# Patient Record
Sex: FEMALE | Race: OTHER | NOT HISPANIC OR LATINO
[De-identification: names, ages, dates, MRNs, and addresses within clinical notes are randomized per-mention and may not be internally consistent; named-entity substitution may affect disease eponyms.]

---

## 2017-03-13 ENCOUNTER — TRANSCRIPTION ENCOUNTER (OUTPATIENT)
Age: 37
End: 2017-03-13

## 2017-03-30 ENCOUNTER — TRANSCRIPTION ENCOUNTER (OUTPATIENT)
Age: 37
End: 2017-03-30

## 2017-06-20 ENCOUNTER — TRANSCRIPTION ENCOUNTER (OUTPATIENT)
Age: 37
End: 2017-06-20

## 2018-02-28 ENCOUNTER — EMERGENCY (EMERGENCY)
Facility: HOSPITAL | Age: 38
LOS: 1 days | Discharge: HOME | End: 2018-02-28
Attending: EMERGENCY MEDICINE | Admitting: EMERGENCY MEDICINE
Payer: COMMERCIAL

## 2018-02-28 VITALS
TEMPERATURE: 99 F | HEART RATE: 63 BPM | SYSTOLIC BLOOD PRESSURE: 141 MMHG | OXYGEN SATURATION: 100 % | DIASTOLIC BLOOD PRESSURE: 99 MMHG | RESPIRATION RATE: 16 BRPM | WEIGHT: 167.99 LBS | HEIGHT: 67 IN

## 2018-02-28 VITALS
DIASTOLIC BLOOD PRESSURE: 81 MMHG | OXYGEN SATURATION: 100 % | RESPIRATION RATE: 16 BRPM | SYSTOLIC BLOOD PRESSURE: 124 MMHG | HEART RATE: 55 BPM

## 2018-02-28 DIAGNOSIS — Z98.891 HISTORY OF UTERINE SCAR FROM PREVIOUS SURGERY: Chronic | ICD-10-CM

## 2018-02-28 DIAGNOSIS — Z98.82 BREAST IMPLANT STATUS: Chronic | ICD-10-CM

## 2018-02-28 DIAGNOSIS — Z98.89 OTHER SPECIFIED POSTPROCEDURAL STATES: Chronic | ICD-10-CM

## 2018-02-28 LAB
ALBUMIN SERPL ELPH-MCNC: 4.2 G/DL — SIGNIFICANT CHANGE UP (ref 3.3–5)
ALP SERPL-CCNC: 47 U/L — SIGNIFICANT CHANGE UP (ref 40–120)
ALT FLD-CCNC: 11 U/L RC — SIGNIFICANT CHANGE UP (ref 10–45)
ANION GAP SERPL CALC-SCNC: 12 MMOL/L — SIGNIFICANT CHANGE UP (ref 5–17)
AST SERPL-CCNC: 14 U/L — SIGNIFICANT CHANGE UP (ref 10–40)
BASOPHILS # BLD AUTO: 0.1 K/UL — SIGNIFICANT CHANGE UP (ref 0–0.2)
BASOPHILS NFR BLD AUTO: 0.9 % — SIGNIFICANT CHANGE UP (ref 0–2)
BILIRUB SERPL-MCNC: 0.5 MG/DL — SIGNIFICANT CHANGE UP (ref 0.2–1.2)
BUN SERPL-MCNC: 13 MG/DL — SIGNIFICANT CHANGE UP (ref 7–23)
CALCIUM SERPL-MCNC: 9.4 MG/DL — SIGNIFICANT CHANGE UP (ref 8.4–10.5)
CHLORIDE SERPL-SCNC: 105 MMOL/L — SIGNIFICANT CHANGE UP (ref 96–108)
CO2 SERPL-SCNC: 22 MMOL/L — SIGNIFICANT CHANGE UP (ref 22–31)
CREAT SERPL-MCNC: 0.82 MG/DL — SIGNIFICANT CHANGE UP (ref 0.5–1.3)
EOSINOPHIL # BLD AUTO: 0.4 K/UL — SIGNIFICANT CHANGE UP (ref 0–0.5)
EOSINOPHIL NFR BLD AUTO: 3.6 % — SIGNIFICANT CHANGE UP (ref 0–6)
GLUCOSE SERPL-MCNC: 84 MG/DL — SIGNIFICANT CHANGE UP (ref 70–99)
HCT VFR BLD CALC: 40 % — SIGNIFICANT CHANGE UP (ref 34.5–45)
HGB BLD-MCNC: 13.7 G/DL — SIGNIFICANT CHANGE UP (ref 11.5–15.5)
LYMPHOCYTES # BLD AUTO: 4.8 K/UL — HIGH (ref 1–3.3)
LYMPHOCYTES # BLD AUTO: 43.9 % — SIGNIFICANT CHANGE UP (ref 13–44)
MCHC RBC-ENTMCNC: 33.1 PG — SIGNIFICANT CHANGE UP (ref 27–34)
MCHC RBC-ENTMCNC: 34.3 GM/DL — SIGNIFICANT CHANGE UP (ref 32–36)
MCV RBC AUTO: 96.7 FL — SIGNIFICANT CHANGE UP (ref 80–100)
MONOCYTES # BLD AUTO: 0.6 K/UL — SIGNIFICANT CHANGE UP (ref 0–0.9)
MONOCYTES NFR BLD AUTO: 5.4 % — SIGNIFICANT CHANGE UP (ref 2–14)
NEUTROPHILS # BLD AUTO: 5.1 K/UL — SIGNIFICANT CHANGE UP (ref 1.8–7.4)
NEUTROPHILS NFR BLD AUTO: 46.2 % — SIGNIFICANT CHANGE UP (ref 43–77)
PLATELET # BLD AUTO: 301 K/UL — SIGNIFICANT CHANGE UP (ref 150–400)
POTASSIUM SERPL-MCNC: 4 MMOL/L — SIGNIFICANT CHANGE UP (ref 3.5–5.3)
POTASSIUM SERPL-SCNC: 4 MMOL/L — SIGNIFICANT CHANGE UP (ref 3.5–5.3)
PROT SERPL-MCNC: 8.1 G/DL — SIGNIFICANT CHANGE UP (ref 6–8.3)
RBC # BLD: 4.14 M/UL — SIGNIFICANT CHANGE UP (ref 3.8–5.2)
RBC # FLD: 11.5 % — SIGNIFICANT CHANGE UP (ref 10.3–14.5)
SODIUM SERPL-SCNC: 139 MMOL/L — SIGNIFICANT CHANGE UP (ref 135–145)
WBC # BLD: 11 K/UL — HIGH (ref 3.8–10.5)
WBC # FLD AUTO: 11 K/UL — HIGH (ref 3.8–10.5)

## 2018-02-28 PROCEDURE — 71046 X-RAY EXAM CHEST 2 VIEWS: CPT | Mod: 26

## 2018-02-28 PROCEDURE — 93010 ELECTROCARDIOGRAM REPORT: CPT

## 2018-02-28 PROCEDURE — 80053 COMPREHEN METABOLIC PANEL: CPT

## 2018-02-28 PROCEDURE — 99284 EMERGENCY DEPT VISIT MOD MDM: CPT | Mod: 25

## 2018-02-28 PROCEDURE — 71046 X-RAY EXAM CHEST 2 VIEWS: CPT

## 2018-02-28 PROCEDURE — 85027 COMPLETE CBC AUTOMATED: CPT

## 2018-02-28 RX ORDER — IBUPROFEN 200 MG
600 TABLET ORAL ONCE
Qty: 0 | Refills: 0 | Status: COMPLETED | OUTPATIENT
Start: 2018-02-28 | End: 2018-02-28

## 2018-02-28 RX ADMIN — Medication 600 MILLIGRAM(S): at 20:46

## 2018-02-28 NOTE — ED PROVIDER NOTE - ATTENDING CONTRIBUTION TO CARE
37y F hx hypothyroid here with L sided chest wall pain for past 2 days. States pain started while she was lifting suitcases on vacation. ALso notes L forearm pain. No radiation of pain down arm. Two locations are separate. Relief with advil. Recently went to [place] and then [facility]. No leg swelling, SOB, palp, ocp use, hx of VTE. No fhx scd or early CAD.   Gen: WNWD NAD  HEENT: NCAT PERRL EOMI normal pharynx  Neck: supple  CV: RRR, no murmur, Focal left sided chest wall pain reproducible on palp, no ecchymosis or crepitus  Lung: CTA BL  Abd: +BS soft NTND  Ext: wwp, palp pulses, FROMx4, no cce  Neuro: A&Ox3, CN grossly intact, sensation intact, motor 5/5 throughout  AP: 37y F hx hypothyroidism here with L chest wall pain reproducible on palp. Low risk Wells. PERC neg. Doubt cardiac or PE given hx. Labs, ekg, cxr.

## 2018-02-28 NOTE — ED PROVIDER NOTE - OBJECTIVE STATEMENT
38 y/o F pmhx hypothyroidism p/w L side chest pain and L arm pain for 2 days. Pt states she was recently on vacation in [place] and the [facility], stated that she lifted heavy luggage, woke up the next morning with sharp L anterior chest pain and forearm pain. States she 'does not feel pain radiates down L arm, feels like chest pain and then also forearm pain.' She denies ever having pain like this before. She denies that pain is worse with movement or with deep breaths; however, L chest wall is tender to the touch over the area of pain. Pain is constant and is approximately 4/10 in severity, was made better by taking ibuprofen this morning but has not tried taking a second dose. No OCPs or other hormone therapy; flight was less than 4 hours, denies any leg swelling, shortness of breath or sweating. No family members with heart disease at young age.

## 2018-02-28 NOTE — ED PROVIDER NOTE - PLAN OF CARE
1. Take ibuprofen for your pain as needed   2. Follow-up with your primary care physician this week for reevaluation   3. Return to the ER for any new or worsening symptoms

## 2018-02-28 NOTE — ED PROVIDER NOTE - CARE PLAN
Principal Discharge DX:	Atypical chest pain  Assessment and plan of treatment:	1. Take ibuprofen for your pain as needed   2. Follow-up with your primary care physician this week for reevaluation   3. Return to the ER for any new or worsening symptoms

## 2018-02-28 NOTE — ED PROVIDER NOTE - MEDICAL DECISION MAKING DETAILS
38 y/o F no pmhx p/w L anterior chest wall pain 4/10 relieved with NSAIDs accompanied by L forearm pain that does not feel like radiation, after lifting heavy suitcases throughout vacation she returned from yesterday. PE remarkable only fo tenderness to L anterior chest wall. FROM extremities. No tachycardia, tachypnea or hypoxia. No leg swelling. PERC negative. EKG within normal limits. No fhx CAD at young age, no risk factors. Will obtain basic labs, provide NSAID as relieved pain previously, CXR and reassess. 36 y/o F no pmhx p/w L anterior chest wall pain 4/10 relieved with NSAIDs accompanied by L forearm pain that does not feel like radiation, after lifting heavy suitcases throughout vacation she returned from yesterday. PE remarkable only fo tenderness to L anterior chest wall. FROM extremities. No tachycardia, tachypnea or hypoxia. No leg swelling. PERC negative. EKG within normal limits. No fhx CAD at young age, no risk factors. Will obtain basic labs, provide NSAID as relieved pain previously, CXR and reassess.  Meño: See attending statement below

## 2018-02-28 NOTE — ED PROVIDER NOTE - PHYSICAL EXAMINATION
GEN: Well Appearing, Nontoxic, NAD  HEENT: Symm Facies, PERRL, EOMI, MMM, posterior pharynx clear  CV: No JVD/Bruits or stridor;  RRR w/o m/g/r. +tenderness to L anterior chest wall   RESP: CTAB w/o w/r/r  ABD: Soft, nt/nd, +bs, no guarding/rebound, no RUQ tender;  No CVAT  EXT: No lower extremity edema or calf tenderness. WWP, palpable pulses. FROMx4  SKIN: No erythema, lesions or rash  Neuro: Grossly intact, AOX3 with normal speech, CN II-XII intact; Sensation intact, motor 5/5 throughout; gait normal

## 2018-02-28 NOTE — ED PROVIDER NOTE - NS ED ROS FT
Constitutional: No fever or chills  Eyes: No visual changes, eye pain or redness  HEENT: No throat pain, ear pain, nasal pain. No nose bleeding.  CV: +chest pain L side anterior chest. No lower extremity edema  Resp: No SOB no cough  GI: No abd pain. No nausea or vomiting. No diarrhea. No constipation.   : No dysuria, hematuria.   MSK: +L forearm pain  Skin: No rash  Neuro: No headache. No numbness or tingling. No weakness.

## 2018-02-28 NOTE — ED ADULT TRIAGE NOTE - CHIEF COMPLAINT QUOTE
cp x 2 days, worse with movement and with deep inspirations radiating down left arm and into left side of neck   recent travel,  no bc, nonsmoker no hx of dvt/pe in the past

## 2018-02-28 NOTE — ED ADULT NURSE NOTE - OBJECTIVE STATEMENT
Pt presents to ED awake, alert and ambulatory, c/o chest pain x 2 days. Pt states she returned from a trip to [place] yesterday. Pt reports left sided pain radiating down her left arm and left neck. Pt denies SOB or palpitations. No cardiac history. Pt appears well. CTABL. h/o hypothyroidism. Pt has been lifting luggage and her daughter.

## 2018-05-11 ENCOUNTER — EMERGENCY (EMERGENCY)
Facility: HOSPITAL | Age: 38
LOS: 1 days | Discharge: HOME | End: 2018-05-11
Attending: PERSONAL EMERGENCY RESPONSE ATTENDANT
Payer: COMMERCIAL

## 2018-05-11 VITALS
RESPIRATION RATE: 18 BRPM | TEMPERATURE: 98 F | DIASTOLIC BLOOD PRESSURE: 65 MMHG | OXYGEN SATURATION: 100 % | HEART RATE: 75 BPM | SYSTOLIC BLOOD PRESSURE: 110 MMHG

## 2018-05-11 VITALS
HEART RATE: 72 BPM | WEIGHT: 169.98 LBS | HEIGHT: 67 IN | OXYGEN SATURATION: 99 % | DIASTOLIC BLOOD PRESSURE: 88 MMHG | SYSTOLIC BLOOD PRESSURE: 129 MMHG | TEMPERATURE: 99 F | RESPIRATION RATE: 18 BRPM

## 2018-05-11 DIAGNOSIS — Z98.89 OTHER SPECIFIED POSTPROCEDURAL STATES: Chronic | ICD-10-CM

## 2018-05-11 DIAGNOSIS — Z98.891 HISTORY OF UTERINE SCAR FROM PREVIOUS SURGERY: Chronic | ICD-10-CM

## 2018-05-11 DIAGNOSIS — Z98.82 BREAST IMPLANT STATUS: Chronic | ICD-10-CM

## 2018-05-11 LAB
ALBUMIN SERPL ELPH-MCNC: 4.5 G/DL — SIGNIFICANT CHANGE UP (ref 3.3–5)
ALP SERPL-CCNC: 49 U/L — SIGNIFICANT CHANGE UP (ref 40–120)
ALT FLD-CCNC: 18 U/L — SIGNIFICANT CHANGE UP (ref 10–45)
ANION GAP SERPL CALC-SCNC: 13 MMOL/L — SIGNIFICANT CHANGE UP (ref 5–17)
AST SERPL-CCNC: 24 U/L — SIGNIFICANT CHANGE UP (ref 10–40)
BASOPHILS # BLD AUTO: 0 K/UL — SIGNIFICANT CHANGE UP (ref 0–0.2)
BASOPHILS NFR BLD AUTO: 0.1 % — SIGNIFICANT CHANGE UP (ref 0–2)
BILIRUB SERPL-MCNC: 0.4 MG/DL — SIGNIFICANT CHANGE UP (ref 0.2–1.2)
BUN SERPL-MCNC: 7 MG/DL — SIGNIFICANT CHANGE UP (ref 7–23)
CALCIUM SERPL-MCNC: 9.6 MG/DL — SIGNIFICANT CHANGE UP (ref 8.4–10.5)
CHLORIDE SERPL-SCNC: 101 MMOL/L — SIGNIFICANT CHANGE UP (ref 96–108)
CO2 SERPL-SCNC: 22 MMOL/L — SIGNIFICANT CHANGE UP (ref 22–31)
CREAT SERPL-MCNC: 0.67 MG/DL — SIGNIFICANT CHANGE UP (ref 0.5–1.3)
EOSINOPHIL # BLD AUTO: 0.5 K/UL — SIGNIFICANT CHANGE UP (ref 0–0.5)
EOSINOPHIL NFR BLD AUTO: 4.9 % — SIGNIFICANT CHANGE UP (ref 0–6)
GLUCOSE SERPL-MCNC: 86 MG/DL — SIGNIFICANT CHANGE UP (ref 70–99)
HCT VFR BLD CALC: 41.2 % — SIGNIFICANT CHANGE UP (ref 34.5–45)
HGB BLD-MCNC: 14 G/DL — SIGNIFICANT CHANGE UP (ref 11.5–15.5)
LYMPHOCYTES # BLD AUTO: 2.4 K/UL — SIGNIFICANT CHANGE UP (ref 1–3.3)
LYMPHOCYTES # BLD AUTO: 25 % — SIGNIFICANT CHANGE UP (ref 13–44)
MCHC RBC-ENTMCNC: 33.2 PG — SIGNIFICANT CHANGE UP (ref 27–34)
MCHC RBC-ENTMCNC: 34 GM/DL — SIGNIFICANT CHANGE UP (ref 32–36)
MCV RBC AUTO: 97.6 FL — SIGNIFICANT CHANGE UP (ref 80–100)
MONOCYTES # BLD AUTO: 0.7 K/UL — SIGNIFICANT CHANGE UP (ref 0–0.9)
MONOCYTES NFR BLD AUTO: 7.3 % — SIGNIFICANT CHANGE UP (ref 2–14)
NEUTROPHILS # BLD AUTO: 6 K/UL — SIGNIFICANT CHANGE UP (ref 1.8–7.4)
NEUTROPHILS NFR BLD AUTO: 62.7 % — SIGNIFICANT CHANGE UP (ref 43–77)
PLATELET # BLD AUTO: 248 K/UL — SIGNIFICANT CHANGE UP (ref 150–400)
POTASSIUM SERPL-MCNC: 4.2 MMOL/L — SIGNIFICANT CHANGE UP (ref 3.5–5.3)
POTASSIUM SERPL-SCNC: 4.2 MMOL/L — SIGNIFICANT CHANGE UP (ref 3.5–5.3)
PROT SERPL-MCNC: 8.1 G/DL — SIGNIFICANT CHANGE UP (ref 6–8.3)
RAPID RVP RESULT: DETECTED
RBC # BLD: 4.22 M/UL — SIGNIFICANT CHANGE UP (ref 3.8–5.2)
RBC # FLD: 12.2 % — SIGNIFICANT CHANGE UP (ref 10.3–14.5)
RV+EV RNA SPEC QL NAA+PROBE: DETECTED
SODIUM SERPL-SCNC: 136 MMOL/L — SIGNIFICANT CHANGE UP (ref 135–145)
WBC # BLD: 9.7 K/UL — SIGNIFICANT CHANGE UP (ref 3.8–10.5)
WBC # FLD AUTO: 9.7 K/UL — SIGNIFICANT CHANGE UP (ref 3.8–10.5)

## 2018-05-11 PROCEDURE — 87486 CHLMYD PNEUM DNA AMP PROBE: CPT

## 2018-05-11 PROCEDURE — 93005 ELECTROCARDIOGRAM TRACING: CPT

## 2018-05-11 PROCEDURE — 87798 DETECT AGENT NOS DNA AMP: CPT

## 2018-05-11 PROCEDURE — 84443 ASSAY THYROID STIM HORMONE: CPT

## 2018-05-11 PROCEDURE — 99285 EMERGENCY DEPT VISIT HI MDM: CPT | Mod: 25

## 2018-05-11 PROCEDURE — 87581 M.PNEUMON DNA AMP PROBE: CPT

## 2018-05-11 PROCEDURE — 71046 X-RAY EXAM CHEST 2 VIEWS: CPT | Mod: 26

## 2018-05-11 PROCEDURE — 94640 AIRWAY INHALATION TREATMENT: CPT

## 2018-05-11 PROCEDURE — 87633 RESP VIRUS 12-25 TARGETS: CPT

## 2018-05-11 PROCEDURE — 80053 COMPREHEN METABOLIC PANEL: CPT

## 2018-05-11 PROCEDURE — 93010 ELECTROCARDIOGRAM REPORT: CPT

## 2018-05-11 PROCEDURE — 99284 EMERGENCY DEPT VISIT MOD MDM: CPT | Mod: 25

## 2018-05-11 PROCEDURE — 96374 THER/PROPH/DIAG INJ IV PUSH: CPT

## 2018-05-11 PROCEDURE — 71046 X-RAY EXAM CHEST 2 VIEWS: CPT

## 2018-05-11 PROCEDURE — 85027 COMPLETE CBC AUTOMATED: CPT

## 2018-05-11 RX ORDER — IPRATROPIUM/ALBUTEROL SULFATE 18-103MCG
3 AEROSOL WITH ADAPTER (GRAM) INHALATION ONCE
Qty: 0 | Refills: 0 | Status: COMPLETED | OUTPATIENT
Start: 2018-05-11 | End: 2018-05-11

## 2018-05-11 RX ORDER — ACETAMINOPHEN 500 MG
1000 TABLET ORAL ONCE
Qty: 0 | Refills: 0 | Status: COMPLETED | OUTPATIENT
Start: 2018-05-11 | End: 2018-05-11

## 2018-05-11 RX ORDER — ALBUTEROL 90 UG/1
3 AEROSOL, METERED ORAL
Qty: 14 | Refills: 0
Start: 2018-05-11 | End: 2018-05-24

## 2018-05-11 RX ORDER — FLUTICASONE PROPIONATE 50 MCG
1 SPRAY, SUSPENSION NASAL
Qty: 1 | Refills: 0
Start: 2018-05-11 | End: 2018-05-15

## 2018-05-11 RX ORDER — SODIUM CHLORIDE 9 MG/ML
500 INJECTION INTRAMUSCULAR; INTRAVENOUS; SUBCUTANEOUS ONCE
Qty: 0 | Refills: 0 | Status: COMPLETED | OUTPATIENT
Start: 2018-05-11 | End: 2018-05-11

## 2018-05-11 RX ADMIN — Medication 3 MILLILITER(S): at 13:56

## 2018-05-11 RX ADMIN — Medication 40 MILLIGRAM(S): at 16:03

## 2018-05-11 RX ADMIN — Medication 1000 MILLIGRAM(S): at 14:15

## 2018-05-11 RX ADMIN — SODIUM CHLORIDE 500 MILLILITER(S): 9 INJECTION INTRAMUSCULAR; INTRAVENOUS; SUBCUTANEOUS at 13:56

## 2018-05-11 RX ADMIN — Medication 3 MILLILITER(S): at 13:55

## 2018-05-11 RX ADMIN — Medication 400 MILLIGRAM(S): at 13:55

## 2018-05-11 NOTE — ED PROVIDER NOTE - OBJECTIVE STATEMENT
36 yo F with pmhx asthma, atrophic kidney disease, and htn presenting with sobx yesterday. Patient states she began with nasal congestion and productive cough yesterday with a fever of 102. Patient states today she began with chest tightness and chest pain. Patient admits to bodyaches. Patient states she took extra amoxicillin yesterday for her symptoms. Patient states fever has resolved. Patient denies recent travel, edema, calf tenderness, ha, tingling, numbness, weakness, back pain, dysuria, hematuria, abd pain, nvd, and sore throat

## 2018-05-11 NOTE — ED ADULT NURSE NOTE - OBJECTIVE STATEMENT
38 y/o female presents to ed c/o cold/ Pt states yesterday she felt congested with cough and uncomfortable in her chest. Had a fever yesterday and took some left over amoxicillin she had at home and Tylenol. Presents afebrile c/o general fatigue and difficulty breathing. Denies ha, n/v/d, abdominal pain, f/c, urinary symptoms, hematuria. A&Ox4, vss, skin warm dry and intact, MAEx4, wheezing noted to lungs, abd soft nondistended. Pt resting comfortably with VSS, no complaints at this time. Patient's bed in the lowest position, explained plan of care to patient and family members. Will continue to reassess.

## 2018-05-11 NOTE — ED PROVIDER NOTE - CONDUCTED A DETAILED DISCUSSION WITH PATIENT AND/OR GUARDIAN REGARDING, MDM
lab results/radiology results lab results/return to ED if symptoms worsen, persist or questions arise/need for outpatient follow-up/radiology results

## 2018-05-11 NOTE — ED PROVIDER NOTE - ATTENDING CONTRIBUTION TO CARE
Attending MD Navarro.  Agree with above.  Pt is a 37 yr old female with pmhx of asthma, HTN, atrophic renal disease with 'normal kidney function' presents today wth fever to 102, nasal congestion, productive cough.  Mild wheezing.  Duonebs dosed.  Fluids administered.  RVP administered.  CXR ordered.  No hx of intubation/admission for asthma.  Pt is moving air throughout bilateral lung fields.  HCG negative.

## 2018-05-11 NOTE — ED PROVIDER NOTE - CARE PLAN
Principal Discharge DX:	Upper respiratory tract infection, unspecified type  Assessment and plan of treatment:	rest and hydration. tylenol 650mg every 6 hours for fever or pain as needed. prednsione 40mg starting tomorrow for four more days. Flonase two sprays each nostril once a day. Start nebulizer treatments at home. Follow up with your primary doctor in 1-2 days or NS clinic [phone]. return to the ER immediately for worsening pain, shortness of breath, vomiting or fever Principal Discharge DX:	Upper respiratory tract infection, unspecified type  Assessment and plan of treatment:	rest and hydration. tylenol 650mg every 6 hours for fever or pain as needed. prednsione 40mg starting tomorrow for four more days. Flonase two sprays each nostril once a day. Start nebulizer treatments at home. Follow up with your primary doctor in 1-2 days or NS clinic [phone]. return to the ER immediately for worsening pain, shortness of breath, vomiting or fever  Secondary Diagnosis:	Rhinovirus infection

## 2018-05-11 NOTE — ED PROVIDER NOTE - PLAN OF CARE
rest and hydration. tylenol 650mg every 6 hours for fever or pain as needed. prednsione 40mg starting tomorrow for four more days. Flonase two sprays each nostril once a day. Start nebulizer treatments at home. Follow up with your primary doctor in 1-2 days or NS clinic [phone]. return to the ER immediately for worsening pain, shortness of breath, vomiting or fever

## 2018-05-11 NOTE — ED PROVIDER NOTE - PROGRESS NOTE DETAILS
discussed results with the patient. Patient feeling better will follow up outpatient Attending MD Navarro.  Pt's breath sounds are now clear, moving air throughout all lung fields without wheezing or inc WOB.  Pt amends that she has been admitted for asthma previously but has never been intubated.  Pt counseled re: rhino/entero dx c/w generalized malaise, rhinorrhea and cough.  She has a nebulizer at home but no refills.  Advised will rx steroids and nebulizer refills.  Pt reassured re: rhino/entero c/w flu-like sxs.  Advised that though current sxs do not warrant admission for supportive care, there are specific sx changes that would warrant emergent return to ED.  Pt counseled that should they develop high fevers >103-104 or fevers unresponsive to tylenol/motrin, should they develop worsened SOB or any respiratory distress including but not limited to increased SOB/inability to catch breath/inability to ambulate without marked SOB, neck pain/stiffness, confusion, inability to tolerate PO or syncope they should return to ED.  As always, any CP/syncope/focal numbness/weakness or tingling, severe thunderclap headache all warrant emergent return as well.  Counseled to alternate tylenol and motrin (650 mg and 600 mg respectively) every 3 hrs for sxs control.  Increase hydration and PO of fluids.  Follow-up with PCP in 1-3 days to be reassessed.  Verbalizes understanding of above instructions and plan of care.  Stable for discharge at this time.  Follow-up with PCP.  Referred to pulmonology for ongoing management of asthma sxs.

## 2018-05-11 NOTE — ED PROVIDER NOTE - CHPI ED SYMPTOMS POS
CHEST CONGESTION/FEVER/DIFFICULTY BREATHING/SHORTNESS OF BREATH/NASAL CONGESTION/CHEST PAIN/CHILLS/COUGH

## 2018-05-11 NOTE — ED PROVIDER NOTE - MEDICAL DECISION MAKING DETAILS
36 yo F with pmhx asthma, atrophic kidney disease, and htn presenting with URI co sob and cp. Ekg/cxr/IVF/urine pregnancy/bw/RVP

## 2018-05-12 LAB — TSH SERPL-MCNC: 0.15 UIU/ML — LOW (ref 0.27–4.2)

## 2018-05-13 NOTE — ED POST DISCHARGE NOTE - DETAILS
Pt doing well, no complaints.  Informed of low TSH level, and need to follow up with PCP for repeat testing as she may need to decrease her dose.  Esau

## 2018-09-03 ENCOUNTER — EMERGENCY (EMERGENCY)
Facility: HOSPITAL | Age: 38
LOS: 1 days | Discharge: HOME | End: 2018-09-03
Attending: EMERGENCY MEDICINE
Payer: COMMERCIAL

## 2018-09-03 VITALS
HEIGHT: 67 IN | HEART RATE: 54 BPM | DIASTOLIC BLOOD PRESSURE: 88 MMHG | SYSTOLIC BLOOD PRESSURE: 129 MMHG | OXYGEN SATURATION: 98 % | TEMPERATURE: 98 F | WEIGHT: 167.99 LBS | RESPIRATION RATE: 18 BRPM

## 2018-09-03 DIAGNOSIS — Z98.89 OTHER SPECIFIED POSTPROCEDURAL STATES: Chronic | ICD-10-CM

## 2018-09-03 DIAGNOSIS — Z98.891 HISTORY OF UTERINE SCAR FROM PREVIOUS SURGERY: Chronic | ICD-10-CM

## 2018-09-03 DIAGNOSIS — Z98.82 BREAST IMPLANT STATUS: Chronic | ICD-10-CM

## 2018-09-03 PROCEDURE — 99284 EMERGENCY DEPT VISIT MOD MDM: CPT

## 2018-09-03 PROCEDURE — 96374 THER/PROPH/DIAG INJ IV PUSH: CPT

## 2018-09-03 PROCEDURE — 96375 TX/PRO/DX INJ NEW DRUG ADDON: CPT

## 2018-09-03 PROCEDURE — 99284 EMERGENCY DEPT VISIT MOD MDM: CPT | Mod: 25

## 2018-09-03 RX ORDER — METOCLOPRAMIDE HCL 10 MG
10 TABLET ORAL ONCE
Qty: 0 | Refills: 0 | Status: COMPLETED | OUTPATIENT
Start: 2018-09-03 | End: 2018-09-03

## 2018-09-03 RX ORDER — ACETAMINOPHEN 500 MG
975 TABLET ORAL ONCE
Qty: 0 | Refills: 0 | Status: COMPLETED | OUTPATIENT
Start: 2018-09-03 | End: 2018-09-03

## 2018-09-03 RX ORDER — CIPROFLOXACIN AND DEXAMETHASONE 3; 1 MG/ML; MG/ML
4 SUSPENSION/ DROPS AURICULAR (OTIC) ONCE
Qty: 0 | Refills: 0 | Status: COMPLETED | OUTPATIENT
Start: 2018-09-03 | End: 2018-09-03

## 2018-09-03 RX ORDER — DEXAMETHASONE 0.5 MG/5ML
10 ELIXIR ORAL ONCE
Qty: 0 | Refills: 0 | Status: COMPLETED | OUTPATIENT
Start: 2018-09-03 | End: 2018-09-03

## 2018-09-03 RX ADMIN — Medication 975 MILLIGRAM(S): at 20:11

## 2018-09-03 RX ADMIN — Medication 10 MILLIGRAM(S): at 20:42

## 2018-09-03 RX ADMIN — CIPROFLOXACIN AND DEXAMETHASONE 4 DROP(S): 3; 1 SUSPENSION/ DROPS AURICULAR (OTIC) at 20:59

## 2018-09-03 NOTE — ED ADULT NURSE NOTE - NSIMPLEMENTINTERV_GEN_ALL_ED
Implemented All Universal Safety Interventions:  [place] to call system. Call bell, personal items and telephone within reach. Instruct patient to call for assistance. Room bathroom lighting operational. Non-slip footwear when patient is off stretcher. Physically safe environment: no spills, clutter or unnecessary equipment. Stretcher in lowest position, wheels locked, appropriate side rails in place.

## 2018-09-03 NOTE — ED PROVIDER NOTE - PROGRESS NOTE DETAILS
Pt feeling 100% better now. Pt asking to go home now. Stable for dc with PCP follow up. Attending Sheldon Rowan DO

## 2018-09-03 NOTE — ED ADULT TRIAGE NOTE - CHIEF COMPLAINT QUOTE
headache for few months pt states saw pmd 2 weeeks ago has appt with neurologist 9/10 but states feeling head pressure

## 2018-09-03 NOTE — ED PROVIDER NOTE - PHYSICAL EXAMINATION
Pt in NAD, A&O x3. GCS 15. CN 2-12 grossly intact. Head NCAT, no scalp or temporal TTP. Sclera white w/o injection PERRLA, EOMI. Nares without deformity, no DC. No auricular tenderness, TMs pearly grey b/l no vesicles. Mouth and pharynx w/o erythema or exudates, uvula midline, no tonsillar enlargement. Neck supple FROM. No midline or paraspinal tenderness. No retractions or chest wall deformities. No chest wall tenderness, CTA anterior and posterior b/l, no wheezes, rales, or rhonchi. RRR clear distinct S1, S2, no S3, S4, murmurs, gallops, or rubs. Abdomen soft, non-tender, no rebound or guarding, no organomegaly or masses. No CVAT b/l. 2+ carotid, radial and dorsalis pulses b/l. No edema or tenderness of the lower extremities. No joint erythema or obvious deformities. Spine without obvious deformity. No midline or paraspinal tenderness. FROM w/o pain of spine, upper and lower extremities b/l. Normal and equal sensation UE, LE and face b/l. 5/5 strength upper and lower extremity b/l.  Romberg negative, pronator drift negative. Normal gait and gross cerebellar functioning. No rashes or vesicles noted on head/face.

## 2018-09-03 NOTE — ED PROVIDER NOTE - OBJECTIVE STATEMENT
39 y/o F with h/o Ashthma, hypothyroidism, kidney atrophy, and recently DX'ed HTN on losartan presents c/o SHEPPARD x 2 months. HA is typically L sided temporoparietal, occasionally b/l, described as a pressure, she has experienced almost daily since onset, intensity is gradually worsening since onset. Pt states that HA is typically present in the morning after she wakes up, initially had modest relief with Tylenol 650mg, but now only has only mild relief. HA lasts most of the day when it occurs. HA a/w nausea, not vomiting, no photophobia, no change in vision, no nuchal rigidity, no f/c, or head trauma. Pt states that once 2 weeks ago she had a L sided periorbital HA that awoke her from sleep but resolved shortly after. Pt has been seen by her PCP x 2 who states this is likely a tension HA, at her last appt she was referred to neuro and has appt 9/10. She presents today because her HA has been more severe than usual and constant since yesterday, 7/10, pressure, occasional radiation down L lateral neck, took Aleve at 12:00 and Advil earlier in the AM with minimal relief.   Denies recent illness/URI, rhinorrhea/nasal congestion/lacrimation a/w, nasal DC, ear pain, head trauma/injury, numbness, paresthesias, weakness, difficulty walking/speaking/swallowing, joint pain/redness, proximal muscle pain/weakness, personal or fh/o migraines or other HA.

## 2018-09-03 NOTE — ED PROVIDER NOTE - CARE PLAN
Principal Discharge DX:	Acute nonintractable headache, unspecified headache type  Secondary Diagnosis:	Acute otitis externa of left ear, unspecified type

## 2018-09-03 NOTE — ED ADULT NURSE NOTE - CHPI ED NUR SYMPTOMS NEG
no nausea/no change in level of consciousness/no dizziness/no numbness/no vomiting/no blurred vision/no weakness/no fever/no loss of consciousness/no confusion

## 2018-09-03 NOTE — ED PROVIDER NOTE - MEDICAL DECISION MAKING DETAILS
Attending Sheldon Rowan DO: 37 yo female presents with left sided temporal headache x2 months. Waxes and wanes. Gradual in onset. No neck pain/stiffness or back pain. No fevers or chills. Also endorses left ear dullness intermittent at times. PE: well appearing, nad, neck supple, CN II-XII intact, normal strength and sensation, normal gait, no pronator drift, Left ear external canal erythematous, tender with otoscopic insertion which reproduces pts headache. A/P: Primary headache vs ear infection causing pain. Will give migraine cocktail, ear drops, reevaluate.

## 2018-09-03 NOTE — ED ADULT NURSE NOTE - OBJECTIVE STATEMENT
pt has left sided headache ro 2 days taking Advil and Motrin not helping.  pt has left eat pian and on exam has inner ear infection redness noted on exam no fever chills no neck pain IVL placed and meds given as ordered Marialuisa

## 2018-09-04 PROBLEM — E03.9 HYPOTHYROIDISM, UNSPECIFIED: Chronic | Status: ACTIVE | Noted: 2018-02-28

## 2018-09-04 PROBLEM — J45.909 UNSPECIFIED ASTHMA, UNCOMPLICATED: Chronic | Status: ACTIVE | Noted: 2018-02-28

## 2018-09-04 PROBLEM — N26.1 ATROPHY OF KIDNEY (TERMINAL): Chronic | Status: ACTIVE | Noted: 2018-02-28

## 2019-12-18 ENCOUNTER — TRANSCRIPTION ENCOUNTER (OUTPATIENT)
Age: 39
End: 2019-12-18

## 2020-03-30 PROBLEM — I10 ESSENTIAL (PRIMARY) HYPERTENSION: Chronic | Status: ACTIVE | Noted: 2018-09-03

## 2020-04-02 PROBLEM — Z00.00 ENCOUNTER FOR PREVENTIVE HEALTH EXAMINATION: Status: ACTIVE | Noted: 2020-04-02

## 2020-04-03 ENCOUNTER — APPOINTMENT (OUTPATIENT)
Dept: DISASTER EMERGENCY | Facility: CLINIC | Age: 40
End: 2020-04-03
Payer: COMMERCIAL

## 2020-04-03 VITALS
SYSTOLIC BLOOD PRESSURE: 118 MMHG | OXYGEN SATURATION: 99 % | TEMPERATURE: 99 F | RESPIRATION RATE: 18 BRPM | HEART RATE: 60 BPM | DIASTOLIC BLOOD PRESSURE: 80 MMHG

## 2020-04-03 DIAGNOSIS — R05 COUGH: ICD-10-CM

## 2020-04-03 DIAGNOSIS — R68.89 OTHER GENERAL SYMPTOMS AND SIGNS: ICD-10-CM

## 2020-04-03 DIAGNOSIS — M79.10 MYALGIA, UNSPECIFIED SITE: ICD-10-CM

## 2020-04-03 DIAGNOSIS — R06.02 SHORTNESS OF BREATH: ICD-10-CM

## 2020-04-03 DIAGNOSIS — R50.9 FEVER, UNSPECIFIED: ICD-10-CM

## 2020-04-03 PROCEDURE — 99213 OFFICE O/P EST LOW 20 MIN: CPT

## 2020-04-03 NOTE — HISTORY OF PRESENT ILLNESS
[] : no dizziness on standing [With Confirmed Case] : patient exposed to a confirmed case of COVID-19 [Lung Disease] : lung disease [None] : none [Clear] : clear [Good Air Entry] : good air entry [Speaks in full sentences] : speaks in full sentences [Normal O2 sat at rest] : normal O2 sat at rest [Grossly normal, interacts, not tired or weak] : grossly normal, interacts, not tired or weak [FreeTextEntry1] : Had low fever, chest cold, lack of energy, x 2 weeks ago. Started with persistent back pain, with chest discomfort when deep breathing, different than Asthma exacerbation. She states occasional dry cough, and SOB at times (uses her inhaler twice daily), was exposed to positive covid-19 person. Patient states has nasal congestion right now\par H/O HTN, Hypothyroidism, Asthma (no hospitalization for approximately 10 years, no intubation ) [TextBox_48] : Rare cough, SOB [TextBox_66] : 40 yo female with history of  Asthma, HTN, Hypothyroidism [TextBox_107] : Ordered and performed nasal swab\par Patient is advised to continue monitoring her temperature, to remain in quarantine until no temperature x 3 days without anti fever medications, then practice social distancing at all times\par Patient is also advised to go to the ER, if severe chest pain, SOB

## 2020-04-03 NOTE — HISTORY OF PRESENT ILLNESS
[] : no dizziness on standing [With Confirmed Case] : patient exposed to a confirmed case of COVID-19 [Lung Disease] : lung disease [None] : none [Clear] : clear [Good Air Entry] : good air entry [Speaks in full sentences] : speaks in full sentences [Normal O2 sat at rest] : normal O2 sat at rest [Grossly normal, interacts, not tired or weak] : grossly normal, interacts, not tired or weak [FreeTextEntry1] : Had low fever, chest cold, lack of energy, x 2 weeks ago. Started with persistent back pain, with chest discomfort when deep breathing, different than Asthma exacerbation. She states occasional dry cough, and SOB at times (uses her inhaler twice daily), was exposed to positive covid-19 person. Patient states has nasal congestion right now\par H/O HTN, Hypothyroidism, Asthma (no hospitalization for approximately 10 years, no intubation ) [TextBox_48] : Rare cough, SOB [TextBox_66] : 38 yo female with history of  Asthma, HTN, Hypothyroidism [TextBox_107] : Ordered and performed nasal swab\par Patient is advised to continue monitoring her temperature, to remain in quarantine until no temperature x 3 days without anti fever medications, then practice social distancing at all times\par Patient is also advised to go to the ER, if severe chest pain, SOB

## 2020-04-07 LAB — SARS-COV-2 N GENE NPH QL NAA+PROBE: DETECTED

## 2020-04-13 ENCOUNTER — TRANSCRIPTION ENCOUNTER (OUTPATIENT)
Age: 40
End: 2020-04-13

## 2020-05-13 PROBLEM — R06.02 SHORTNESS OF BREATH: Status: ACTIVE | Noted: 2020-05-13

## 2020-05-13 PROBLEM — R05 COUGH: Status: ACTIVE | Noted: 2020-05-13

## 2020-05-13 PROBLEM — M79.10 MYALGIA: Status: ACTIVE | Noted: 2020-05-13

## 2020-05-13 PROBLEM — R50.9 FEVER: Status: ACTIVE | Noted: 2020-05-13

## 2020-09-09 NOTE — ED PROVIDER NOTE - DIAGNOSIS COUNSELING, MDM
Patient Location: Home       Provider Location ([facility]/State): [place]       This virtual visit was conducted via interactive/real-time audio/video. Pursuant to the emergency declaration under the [facility], [facility] waiver authority and the [employer] Act, this Virtual  Visit was conducted, with patient's consent, to reduce the patient's risk of exposure to COVID-19 and provide continuity of care for an established patient. Services were provided through a video synchronous discussion virtually to substitute for in-person clinic visit. Additionally, this provider made reasonable effort to verify identify of patient, conducted risk benefit analysis and have determined patient's presenting problem and condition are consistent with the use of telepsychology to patient's benefit, ensured pt has access, knowledge, and skills required to use required technology, obtained alternative means of contacting patient, provided pt with alternative means of contacting provider, reviewed informed consent and obtained verbal agreement in lieu of written informed consent, as such is rendered impossible due to the unexpected nature secondary to COVID-19 clinical recommendations. Behavioral Health Consultation  Aishwarya Decker Psy.D. Psychologist      Time spent with Patient: 30 minutes  Visit number: 2  Reason for Consult:  AD/HD and anxiety   Referring Provider: Mehul Sullivan MD   [facility], [address] [place] [address]    S:  ----------------------------------------------------------------------------------------------------------------------  AD/HD and anxiety   \"It's been a rough week. He's been having nightmares. \" Mom stated he has \"broken down\" two or three times in past two weeks. Grief process continues. Today time was spent discussing physical expressions of grief. Tj and mom present for duration of visit. O:  ----------------------------------------------------------------------------------------------------------------------  MSE:  Orientation:  oriented to person, place, time, and general circumstances  Appearance:  alert, cooperative  Speech:  spontaneous, normal rate and normal volume  Mood: AD/HD and anxiety    Thought Content:  intact  Thought Process:  linear, goal directed and coherent  Interest/Pleasure: Normal  Concentration: HX of ADHD  Sleep disturbance: No  Appetite: Decreased  Memory:  recent and remote memory intact  Energy: Normal  Morbid ideation No  Suicide Assessment: no suicidal ideation    A:  ----------------------------------------------------------------------------------------------------------------------  Diagnosis:    1. Attention deficit hyperactivity disorder (ADHD), combined type    2. Anxiety         P:  ----------------------------------------------------------------------------------------------------------------------  Pt interventions:    General:   [x] Blairs-setting to identify pt's primary goals for [facility] visit / overall health   [x] Provided psychoeducation/handout on:   1. Attention deficit hyperactivity disorder (ADHD), combined type    2. Anxiety        [x]  Supportive interventions    Behavioral:   [x] Discussed and set plan for behavioral management of   1. Attention deficit hyperactivity disorder (ADHD), combined type    2. Anxiety        [x] Discussed and problem-solved barriers in adhering to behavioral change plan   [x] Motivational Interviewing to increase patient confidence and compliance with       adhering to behavioral change plan   [x] Discussed potential barriers to change   [x] Discussed self-care (sleep, nutrition, rewarding activities, social support, exercise)    Other:   []   []   []   []    Recommendations to patient:    1. Return to Dr. Beatriz Hernandez in 2 week(s)    2.                Feedback provided to pt's PCP via EPIC and/or oral report conducted a detailed discussion... I had a detailed discussion with the patient and/or guardian regarding the historical points, exam findings, and any diagnostic results supporting the discharge/admit diagnosis.

## 2020-12-09 ENCOUNTER — TRANSCRIPTION ENCOUNTER (OUTPATIENT)
Age: 40
End: 2020-12-09

## 2020-12-10 ENCOUNTER — TRANSCRIPTION ENCOUNTER (OUTPATIENT)
Age: 40
End: 2020-12-10

## 2021-02-08 ENCOUNTER — TRANSCRIPTION ENCOUNTER (OUTPATIENT)
Age: 41
End: 2021-02-08

## 2021-02-17 ENCOUNTER — TRANSCRIPTION ENCOUNTER (OUTPATIENT)
Age: 41
End: 2021-02-17

## 2021-03-18 ENCOUNTER — TRANSCRIPTION ENCOUNTER (OUTPATIENT)
Age: 41
End: 2021-03-18

## 2021-03-20 ENCOUNTER — APPOINTMENT (OUTPATIENT)
Dept: ORTHOPEDIC SURGERY | Facility: CLINIC | Age: 41
End: 2021-03-20
Payer: COMMERCIAL

## 2021-03-20 VITALS
WEIGHT: 168 LBS | BODY MASS INDEX: 26.37 KG/M2 | DIASTOLIC BLOOD PRESSURE: 80 MMHG | HEIGHT: 67 IN | SYSTOLIC BLOOD PRESSURE: 116 MMHG | HEART RATE: 55 BPM

## 2021-03-20 DIAGNOSIS — S43.401A UNSPECIFIED SPRAIN OF RIGHT SHOULDER JOINT, INITIAL ENCOUNTER: ICD-10-CM

## 2021-03-20 PROCEDURE — 99204 OFFICE O/P NEW MOD 45 MIN: CPT

## 2021-03-20 PROCEDURE — 99072 ADDL SUPL MATRL&STAF TM PHE: CPT

## 2021-03-20 RX ORDER — CELECOXIB 200 MG/1
200 CAPSULE ORAL DAILY
Qty: 30 | Refills: 1 | Status: ACTIVE | COMMUNITY
Start: 2021-03-20 | End: 1900-01-01

## 2021-03-20 NOTE — PHYSICAL EXAM
[de-identified] : Right Shoulder: No obvious deformities, atrophy, ecchymosis, or swelling/effusion. Mild tenderness to palpation of the glenohumeral joint. Negative tenderness to palpation of AC joint, clavicle, sternoclavicular joint, rotator cuff, and proximal biceps/triceps. Normal active and passive range of motion, including flexion to 180 degrees, and abduction to 150 degrees, Mildly painful internal/external rotation to 50 degrees. Negative provocative testing, including Gloria, Neer's, Cross-Body Adduction, [place]'s, Speeds tests, Drop Arm, Empty Can, and Lift Off tests. Neurovascular status is intact.\par \par Otherwise, no apparent distress. Alert and oriented x 3. Normal mood and affect. No atrophy or swelling. 5 out of 5 motor strength. Sensation is intact and symmetrical. Normal deep tendon reflexes. Full range of motion of shoulder, elbows, hips, and knees (flexion, extension, and rotation). No palpatory tenderness or palpable lymph nodes. Negative straight leg raise. Normal finger-to-nose test. No pathological reflexes. Normal ambulation. No upper or lower extremity instability. [de-identified] : Procedure was performed at the [facility]\par \par EXAM: SHOULDER RIGHT\par \par PROCEDURE DATE: 03/18/2021\par \par INTERPRETATION: CLINICAL INDICATION: Right shoulder pain. Question fracture or dislocation.\par TECHNIQUE: AP internal rotation, AP external rotation, and scapular Y views of the right shoulder.\par \par COMPARISON: None available.\par \par FINDINGS:\par \par No acute fracture. No dislocation. Glenohumeral cartilage space is maintained. No AC joint arthropathy. No abnormal soft tissue swelling or mineralization. Imaged portions of the lung are clear.\par \par IMPRESSION:\par No acute fracture or dislocation.\par \par SERGEY EDWARDS MD; Attending Radiologist\par This document has been electronically signed. Mar 18 2021 4:59PM

## 2021-03-20 NOTE — HISTORY OF PRESENT ILLNESS
[de-identified] : Ms. DAVID JAMISON is a 40 year female who presents to office complaining of right shoulder pain.\par She says on 3/16/21 she went to work out and do a push up, but she slipped as the mat was still wet, and her shoulder "gave out" resulting in her shoulder hyperabducting. This produced a "popping" feeling in the anterior aspect of the shoulder.\par Now, the pain is improving since then. It is described as a dull/ache in the general shoulder area. Pain is worsened with overhead activities, but not enough to prevent her from working out, which she has still done since the injury. \par Patient denies numbness/tingling of the shoulder or a sense of shoulder clicking/locking/instability.\par She has only taken Tylenol for the pain, which has helped.\par All review of systems, family history, social history, surgical history, past medical history, medications, and allergies not previously stated as positive are negative. They were reviewed by me today with the patient and documented accordingly. [Improving] : improving [3] : a current pain level of 3/10 [5] : a maximum pain level of 5/10 [Lifting] : worsened by lifting [Rest] : relieved by rest

## 2021-03-20 NOTE — DISCUSSION/SUMMARY
[de-identified] : Right shoulder sprain\par Celebrex prescribed\par Rest from upper body work out/exercises of the shoulder recommended for the next 2-3 weeks\par Patient deferred on MRI/PT for the time being, since she is improving\par Follow up with Dr. Sprague in 4 weeks if shoulder pain does not improve\par All options discussed with patient.\par All questions by patient answered to their satisfaction.\par Patient expresses full understanding and agreement with plan.\par Patient is happy with the office visit.

## 2021-03-22 ENCOUNTER — TRANSCRIPTION ENCOUNTER (OUTPATIENT)
Age: 41
End: 2021-03-22

## 2021-03-30 ENCOUNTER — EMERGENCY (EMERGENCY)
Facility: HOSPITAL | Age: 41
LOS: 1 days | Discharge: HOME | End: 2021-03-30
Attending: STUDENT IN AN ORGANIZED HEALTH CARE EDUCATION/TRAINING PROGRAM
Payer: COMMERCIAL

## 2021-03-30 VITALS
DIASTOLIC BLOOD PRESSURE: 83 MMHG | HEART RATE: 56 BPM | SYSTOLIC BLOOD PRESSURE: 112 MMHG | RESPIRATION RATE: 16 BRPM | OXYGEN SATURATION: 100 %

## 2021-03-30 VITALS
OXYGEN SATURATION: 100 % | HEIGHT: 67 IN | RESPIRATION RATE: 18 BRPM | WEIGHT: 167.99 LBS | TEMPERATURE: 98 F | DIASTOLIC BLOOD PRESSURE: 79 MMHG | SYSTOLIC BLOOD PRESSURE: 125 MMHG | HEART RATE: 70 BPM

## 2021-03-30 DIAGNOSIS — Z98.82 BREAST IMPLANT STATUS: Chronic | ICD-10-CM

## 2021-03-30 DIAGNOSIS — Z98.891 HISTORY OF UTERINE SCAR FROM PREVIOUS SURGERY: Chronic | ICD-10-CM

## 2021-03-30 DIAGNOSIS — Z98.89 OTHER SPECIFIED POSTPROCEDURAL STATES: Chronic | ICD-10-CM

## 2021-03-30 LAB
ALBUMIN SERPL ELPH-MCNC: 4.8 G/DL — SIGNIFICANT CHANGE UP (ref 3.3–5)
ALP SERPL-CCNC: 60 U/L — SIGNIFICANT CHANGE UP (ref 40–120)
ALT FLD-CCNC: 10 U/L — SIGNIFICANT CHANGE UP (ref 10–45)
ANION GAP SERPL CALC-SCNC: 18 MMOL/L — HIGH (ref 5–17)
APTT BLD: 29.6 SEC — SIGNIFICANT CHANGE UP (ref 27.5–35.5)
AST SERPL-CCNC: 17 U/L — SIGNIFICANT CHANGE UP (ref 10–40)
BASOPHILS # BLD AUTO: 0.04 K/UL — SIGNIFICANT CHANGE UP (ref 0–0.2)
BASOPHILS NFR BLD AUTO: 0.5 % — SIGNIFICANT CHANGE UP (ref 0–2)
BILIRUB SERPL-MCNC: 0.8 MG/DL — SIGNIFICANT CHANGE UP (ref 0.2–1.2)
BUN SERPL-MCNC: 12 MG/DL — SIGNIFICANT CHANGE UP (ref 7–23)
CALCIUM SERPL-MCNC: 10 MG/DL — SIGNIFICANT CHANGE UP (ref 8.4–10.5)
CHLORIDE SERPL-SCNC: 105 MMOL/L — SIGNIFICANT CHANGE UP (ref 96–108)
CO2 SERPL-SCNC: 18 MMOL/L — LOW (ref 22–31)
CREAT SERPL-MCNC: 0.79 MG/DL — SIGNIFICANT CHANGE UP (ref 0.5–1.3)
EOSINOPHIL # BLD AUTO: 0.18 K/UL — SIGNIFICANT CHANGE UP (ref 0–0.5)
EOSINOPHIL NFR BLD AUTO: 2.3 % — SIGNIFICANT CHANGE UP (ref 0–6)
GLUCOSE SERPL-MCNC: 105 MG/DL — HIGH (ref 70–99)
HCG SERPL-ACNC: <2 MIU/ML — SIGNIFICANT CHANGE UP
HCT VFR BLD CALC: 41.6 % — SIGNIFICANT CHANGE UP (ref 34.5–45)
HGB BLD-MCNC: 13.8 G/DL — SIGNIFICANT CHANGE UP (ref 11.5–15.5)
IMM GRANULOCYTES NFR BLD AUTO: 0.3 % — SIGNIFICANT CHANGE UP (ref 0–1.5)
INR BLD: 1.01 RATIO — SIGNIFICANT CHANGE UP (ref 0.88–1.16)
LIDOCAIN IGE QN: 39 U/L — SIGNIFICANT CHANGE UP (ref 7–60)
LYMPHOCYTES # BLD AUTO: 3.08 K/UL — SIGNIFICANT CHANGE UP (ref 1–3.3)
LYMPHOCYTES # BLD AUTO: 39.3 % — SIGNIFICANT CHANGE UP (ref 13–44)
MCHC RBC-ENTMCNC: 32.7 PG — SIGNIFICANT CHANGE UP (ref 27–34)
MCHC RBC-ENTMCNC: 33.2 GM/DL — SIGNIFICANT CHANGE UP (ref 32–36)
MCV RBC AUTO: 98.6 FL — SIGNIFICANT CHANGE UP (ref 80–100)
MONOCYTES # BLD AUTO: 0.43 K/UL — SIGNIFICANT CHANGE UP (ref 0–0.9)
MONOCYTES NFR BLD AUTO: 5.5 % — SIGNIFICANT CHANGE UP (ref 2–14)
NEUTROPHILS # BLD AUTO: 4.09 K/UL — SIGNIFICANT CHANGE UP (ref 1.8–7.4)
NEUTROPHILS NFR BLD AUTO: 52.1 % — SIGNIFICANT CHANGE UP (ref 43–77)
NRBC # BLD: 0 /100 WBCS — SIGNIFICANT CHANGE UP (ref 0–0)
PLATELET # BLD AUTO: 312 K/UL — SIGNIFICANT CHANGE UP (ref 150–400)
POTASSIUM SERPL-MCNC: 3.9 MMOL/L — SIGNIFICANT CHANGE UP (ref 3.5–5.3)
POTASSIUM SERPL-SCNC: 3.9 MMOL/L — SIGNIFICANT CHANGE UP (ref 3.5–5.3)
PROT SERPL-MCNC: 8.4 G/DL — HIGH (ref 6–8.3)
PROTHROM AB SERPL-ACNC: 12.1 SEC — SIGNIFICANT CHANGE UP (ref 10.6–13.6)
RBC # BLD: 4.22 M/UL — SIGNIFICANT CHANGE UP (ref 3.8–5.2)
RBC # FLD: 13.3 % — SIGNIFICANT CHANGE UP (ref 10.3–14.5)
SODIUM SERPL-SCNC: 141 MMOL/L — SIGNIFICANT CHANGE UP (ref 135–145)
TROPONIN T, HIGH SENSITIVITY RESULT: <6 NG/L — SIGNIFICANT CHANGE UP (ref 0–51)
WBC # BLD: 7.84 K/UL — SIGNIFICANT CHANGE UP (ref 3.8–10.5)
WBC # FLD AUTO: 7.84 K/UL — SIGNIFICANT CHANGE UP (ref 3.8–10.5)

## 2021-03-30 PROCEDURE — 99284 EMERGENCY DEPT VISIT MOD MDM: CPT | Mod: 25

## 2021-03-30 PROCEDURE — 71045 X-RAY EXAM CHEST 1 VIEW: CPT

## 2021-03-30 PROCEDURE — 71045 X-RAY EXAM CHEST 1 VIEW: CPT | Mod: 26

## 2021-03-30 PROCEDURE — 80053 COMPREHEN METABOLIC PANEL: CPT

## 2021-03-30 PROCEDURE — 93005 ELECTROCARDIOGRAM TRACING: CPT

## 2021-03-30 PROCEDURE — 99285 EMERGENCY DEPT VISIT HI MDM: CPT

## 2021-03-30 PROCEDURE — 84484 ASSAY OF TROPONIN QUANT: CPT

## 2021-03-30 PROCEDURE — 93010 ELECTROCARDIOGRAM REPORT: CPT

## 2021-03-30 PROCEDURE — 85610 PROTHROMBIN TIME: CPT

## 2021-03-30 PROCEDURE — 84702 CHORIONIC GONADOTROPIN TEST: CPT

## 2021-03-30 PROCEDURE — 83690 ASSAY OF LIPASE: CPT

## 2021-03-30 PROCEDURE — 96374 THER/PROPH/DIAG INJ IV PUSH: CPT

## 2021-03-30 PROCEDURE — 85025 COMPLETE CBC W/AUTO DIFF WBC: CPT

## 2021-03-30 PROCEDURE — 85730 THROMBOPLASTIN TIME PARTIAL: CPT

## 2021-03-30 RX ORDER — ASPIRIN/CALCIUM CARB/MAGNESIUM 324 MG
243 TABLET ORAL ONCE
Refills: 0 | Status: COMPLETED | OUTPATIENT
Start: 2021-03-30 | End: 2021-03-30

## 2021-03-30 RX ORDER — FAMOTIDINE 10 MG/ML
20 INJECTION INTRAVENOUS ONCE
Refills: 0 | Status: COMPLETED | OUTPATIENT
Start: 2021-03-30 | End: 2021-03-30

## 2021-03-30 RX ADMIN — Medication 243 MILLIGRAM(S): at 09:34

## 2021-03-30 RX ADMIN — FAMOTIDINE 20 MILLIGRAM(S): 10 INJECTION INTRAVENOUS at 09:34

## 2021-03-30 RX ADMIN — Medication 30 MILLILITER(S): at 09:34

## 2021-03-30 NOTE — ED PROVIDER NOTE - NSFOLLOWUPINSTRUCTIONS_ED_ALL_ED_FT
Follow up with your Primary Care Physician within the next 2-3 days  Bring a copy of your test results with you to your appointment  Continue your current medication regimen  Return to the Emergency Room if you experience new or worsening symptoms abdominal pain, nausea, vomiting, fever chills, cough, chest pain, shortness of breath, dizziness, slurred speech, weakness, gait abnormality   Stablish care with cardiologist Dr. Landaverde [address], [address]  [place] [address]  Phone: [phone]

## 2021-03-30 NOTE — ED ADULT NURSE NOTE - NSIMPLEMENTINTERV_GEN_ALL_ED
Implemented All Universal Safety Interventions:  [place] to call system. Call bell, personal items and telephone within reach. Instruct patient to call for assistance. Room bathroom lighting operational. Non-slip footwear when patient is off stretcher. Physically safe environment: no spills, clutter or unnecessary equipment. Stretcher in lowest position, wheels locked, appropriate side rails in place.

## 2021-03-30 NOTE — ED PROVIDER NOTE - PROGRESS NOTE DETAILS
Harmeet HANSON: pt reports feels improved after meds, pressure is gone now, noted mild AG and low bicarb, MUDPILES screen neg, denies APAP ingestion, feels well agrees w plan for dc w close pmd follow up, does report has not been eating and drinking as much over past few days.

## 2021-03-30 NOTE — ED PROVIDER NOTE - OBJECTIVE STATEMENT
Harmeet HANSON: 40F hx HTN hyothyroid fam hx of heart CAD 65y/o presents with a cc of non exertional chest pressure L sided non radiating starting at rest while on plane yesterday not going away prompting ED visit, no new LE swelling back pain no prior hx of PE/DVT no exertional sx, no SOB, no ANDRES, had stress test years ago that was normal former smoker quit in 2009. No OCPS or hormones. No other complaints, no sweating when getting pressure, COVID in March 2020. Denies n/v/f/c/cp/. Denies headache, syncope, lightheadedness, dizziness. Denies chest palpitations, abdominal pain. Denies edema. Denies dysuria, hematuria, BRBPR, tarry stools, diarrhea, constipation.

## 2021-03-30 NOTE — ED PROVIDER NOTE - CARE PROVIDER_API CALL
Jose Landaverde (DO)  Cardiology; Internal Medicine  [address] [place] [address], [address]  [place] [address]  Phone: [phone]  Fax: [phone]  Follow Up Time: 4-6 Days

## 2021-03-30 NOTE — ED PROVIDER NOTE - ATTENDING CONTRIBUTION TO CARE
I have personally performed a face to face medical and diagnostic evaluation of the patient. I have discussed with and reviewed the ACP's note and agree with the History, ROS, Physical Exam and MDM unless otherwise indicated. A brief summary of my personal evaluation and impression can be found below.    Harmeet HANSON: Please see the HPI, ROS, PE and MDM as authored by me.

## 2021-03-30 NOTE — ED PROVIDER NOTE - CLINICAL SUMMARY MEDICAL DECISION MAKING FREE TEXT BOX
Harmeet HANSON: 40F hx HTN hyothyroid fam hx of heart CAD 63y/o presents with a cc of non exertional chest pressure L sided non radiating starting at rest while on plane yesterday not going away prompting ED visit, no new LE swelling back pain no prior hx of PE/DVT no exertional sx, no SOB, no ANDRES, had stress test years ago that was normal former smoker quit in 2009, exam vss non toxic non focal exam ddx c/f acs vs gerd vs less likely PE, low c/f dissection given no fnd no back pain less c/f intrabdominal surgical pathology given no abdominal ttp, will check labs cardiacs cxr give meds, reassess. heart score 2, perc neg.

## 2021-03-30 NOTE — ED PROVIDER NOTE - PHYSICAL EXAMINATION
Harmeet HANSON:  VITALS: Initial triage and subsequent vitals have been reviewed by me.  GEN APPEARANCE: WDWN, alert and cooperative, non-toxic appearing and in NAD  HEAD: Atraumatic, normocephalic   EYES: PERRLa, EOMI, vision grossly intact.   EARS: Gross hearing intact.   NOSE: No nasal discharge, no external evidence of epistaxis.   NECK: Supple  CV: RRR, S1S2, no c/r/m/g. No cyanosis or pallor. Extremities warm, well perfused. Cap refill <2 seconds. No bruits.   LUNGS: CTAB. No wheezing. No rales. No rhonchi. No diminished breath sounds.   ABDOMEN: Soft, NTND. No guarding or rebound. No masses.   MSK: Spine appears normal, no spine point tenderness. No CVA ttp. No joint erythema or tenderness. Normal muscular development. Pelvis stable.  EXTREMITIES: No peripheral edema. No obvious joint or bony deformity.  NEURO: Alert, follows commands. Weight bearing normal. Speech normal. Sensation and motor normal x4 extremities.   SKIN: Normal color for race, warm, dry and intact. No evidence of rash.  PSYCH: Normal mood and affect.

## 2021-03-30 NOTE — ED ADULT NURSE NOTE - OBJECTIVE STATEMENT
39 yo female presents to ED c/o chest pressure since last night. Patient reports hx of asthma, took inhalers with no relief of pressure. States she has no difficulty breathing, only admits to chest pressure. Patient states nothing makes it better or worse, states she has been in her usual state of health until this began while on plane yesterday. Patient denies SOB, ANDRES, nvd, fever/chills, sick contacts, falls/loc. Patient A&Ox3, breathing spontaneously, airway patent, bl clear lungs, abdomen nontender, +pulses, cap refill <2 seconds. Patient resting in bed, side rails up, plan of care explained. Cardiac monitor in place, MD at bedside, EKG completed and signed by MD Ga.

## 2021-03-30 NOTE — ED ADULT TRIAGE NOTE - CHIEF COMPLAINT QUOTE
heaviness in chest onset last night, history of Asthma, took Albuterol inhalation with mild relief. Came back fro [place] yesterday

## 2021-03-30 NOTE — ED PROVIDER NOTE - PATIENT PORTAL LINK FT
You can access the FollowMyHealth Patient Portal offered by [facility] by registering at the following website: [email or web address]. By joining [employer]’s FollowMyHealth portal, you will also be able to view your health information using other applications (apps) compatible with our system.

## 2021-05-13 ENCOUNTER — TRANSCRIPTION ENCOUNTER (OUTPATIENT)
Age: 41
End: 2021-05-13

## 2021-07-13 ENCOUNTER — APPOINTMENT (OUTPATIENT)
Dept: UROGYNECOLOGY | Facility: CLINIC | Age: 41
End: 2021-07-13
Payer: COMMERCIAL

## 2021-07-13 DIAGNOSIS — Z87.448 PERSONAL HISTORY OF OTHER DISEASES OF URINARY SYSTEM: ICD-10-CM

## 2021-07-13 DIAGNOSIS — Z78.9 OTHER SPECIFIED HEALTH STATUS: ICD-10-CM

## 2021-07-13 DIAGNOSIS — R35.0 FREQUENCY OF MICTURITION: ICD-10-CM

## 2021-07-13 DIAGNOSIS — Z86.39 PERSONAL HISTORY OF OTHER ENDOCRINE, NUTRITIONAL AND METABOLIC DISEASE: ICD-10-CM

## 2021-07-13 DIAGNOSIS — Z87.09 PERSONAL HISTORY OF OTHER DISEASES OF THE RESPIRATORY SYSTEM: ICD-10-CM

## 2021-07-13 DIAGNOSIS — Z82.49 FAMILY HISTORY OF ISCHEMIC HEART DISEASE AND OTHER DISEASES OF THE CIRCULATORY SYSTEM: ICD-10-CM

## 2021-07-13 LAB
BILIRUB UR QL STRIP: NORMAL
CLARITY UR: CLEAR
COLLECTION METHOD: NORMAL
GLUCOSE UR-MCNC: NORMAL
HCG UR QL: 0.2 EU/DL
HGB UR QL STRIP.AUTO: NORMAL
KETONES UR-MCNC: NORMAL
LEUKOCYTE ESTERASE UR QL STRIP: NORMAL
NITRITE UR QL STRIP: NORMAL
PH UR STRIP: 6.5
PROT UR STRIP-MCNC: NORMAL
SP GR UR STRIP: 1.01

## 2021-07-13 PROCEDURE — 51701 INSERT BLADDER CATHETER: CPT

## 2021-07-13 PROCEDURE — 99205 OFFICE O/P NEW HI 60 MIN: CPT | Mod: 25

## 2021-07-13 PROCEDURE — 81003 URINALYSIS AUTO W/O SCOPE: CPT | Mod: QW

## 2021-07-13 PROCEDURE — 99072 ADDL SUPL MATRL&STAF TM PHE: CPT

## 2021-07-13 NOTE — PHYSICAL EXAM
[Chaperone Present] : A chaperone was present in the examining room during all aspects of the physical examination [Labia Majora] : were normal [Labia Minora] : were normal [Normal Appearance] : general appearance was normal [Uterine Adnexae] : were not tender and not enlarged [Normal] : no abnormalities [Exam Deferred] : was deferred [FreeTextEntry1] : \par \par General: Well, appearing. Alert and orientated. No acute distress\par HEENT: Normocephalic, atraumatic and extraocular muscles appear to be intact \par Neck: Full range of motion, no obvious lymphadenopathy, deformities, or masses noted \par Respiratory: Speaking in full sentences comfortably, normal work of breathing and no cough during visit\par Musculoskeletal: active full range of motion in extremities \par Extremities: No upper extremity edema noted\par Skin: no obvious rash or skin lesions\par Neuro: Orientated X 3, speech is fluent, normal rate\par Psych: Normal mood and affect  [Tenderness] : ~T no ~M abdominal tenderness observed [Distended] : not distended

## 2021-07-13 NOTE — HISTORY OF PRESENT ILLNESS
[Unable To Restrain Bowel Movement] : moderate [Urinary Frequency] : no [Feelings Of Urinary Urgency] : severe [Urinary Frequency More Than Twice At Night (Nocturia)] : no nocturia [Urinary Tract Infection] : severe [] : years ago [de-identified] : UUI>CARLTON [FreeTextEntry1] : \par \par PMH: hypothyroid, asthma, HTN, left atrophic kidney\par PSH: csection, breast augmentation\par Social History: nonsmoker, employed \par No longer desires future childbearing\par \par \par daily fluid intake: 2 c coffee, 3L water w/ vit C or D supplement, 1 green juice

## 2021-07-13 NOTE — PROCEDURE
[Fluid Management] : fluid management [FreeTextEntry1] : \par \par Sterile straight catheterization was performed to rule out infection and to measure a postvoid residual volume which was 30 cc\par

## 2021-07-13 NOTE — DISCUSSION/SUMMARY
[FreeTextEntry1] : \par Patient presents with symptoms of mixed urinary incontinence with a predominant urgency component. We discussed possible etiologies of her symptoms including both stress urinary incontinence and overactive bladder. We reviewed management options for both conditions. I recommend further workup of her urinary symptoms with urodynamic testing. We reviewed behavioral modifications and bladder training. Written and verbal instructions  were provided to her as well. She will return to my office for urodynamics and followup with me to discuss results and management options further. \par \par The following treatment plan was designed for this patient and provided to her in written form and reviewed extensively. Patient was given a copy to take home: \par \par \par Overactive bladder (frequent urination, urinary urgency, difficulty holding urine)\par -Total fluid intake: 1.5 -2 L daily\par Ex. 8 oz coffee OR tea (not both!), 2-3 bottles of water (each is 16.9 oz)\par Drink water slowly (bottle should take hours to finish, not minutes). Don’t binge drink! Do not add anything to the water (no crystal light, or flavoring)\par \par Avoid: iced tea, carbonation (soda, seltzer, sparkling water), alcohol, citrus, spicy foods, artificial sweeteners, chocolate\par \par Stop eating and drinking 2-3 hours before bedtime (sips are ok)\par \par -Try the above fluid changes for 4 weeks. If symptoms remain bothersome after 4 weeks, will try adding a medication.\par \par Stress urinary incontinence (leakage with coughing, sneezing, lifting, exercise, sudden movements)\par \par - Will schedule Urodynamics test in my [place] office. Urodynamics test evaluates your bladder function and diagnoses leakage conditions. Test takes ~20 minutes. There is no preparation that you need to do before the test. There are no activity restrictions after the test. \par \par Can try the Revive (order from [employer]) when doing activities that cause leakage. Can use astroglide (lubricant) on applicator when inserting. When removing the device, can apply lubricant inside vagina before pulling it out.\par \par -You will have a follow up appointment with me to discuss test results and treatment options \par

## 2021-07-14 PROBLEM — Z78.9 NON-SMOKER: Status: ACTIVE | Noted: 2021-07-14

## 2021-07-14 PROBLEM — Z87.09 HISTORY OF ASTHMA: Status: RESOLVED | Noted: 2021-07-14 | Resolved: 2021-07-14

## 2021-07-14 PROBLEM — Z82.49 FAMILY HISTORY OF HYPERTENSION: Status: ACTIVE | Noted: 2021-07-14

## 2021-07-14 PROBLEM — Z78.9 SOCIAL ALCOHOL USE: Status: ACTIVE | Noted: 2021-07-14

## 2021-07-14 PROBLEM — Z87.448 HISTORY OF KIDNEY DISEASE: Status: RESOLVED | Noted: 2021-07-14 | Resolved: 2021-07-14

## 2021-07-14 PROBLEM — Z86.39 HISTORY OF THYROID DISORDER: Status: RESOLVED | Noted: 2021-07-14 | Resolved: 2021-07-14

## 2021-07-26 ENCOUNTER — EMERGENCY (EMERGENCY)
Facility: HOSPITAL | Age: 41
LOS: 1 days | Discharge: HOME | End: 2021-07-26
Attending: EMERGENCY MEDICINE
Payer: COMMERCIAL

## 2021-07-26 VITALS
WEIGHT: 169.98 LBS | TEMPERATURE: 99 F | DIASTOLIC BLOOD PRESSURE: 86 MMHG | HEIGHT: 67 IN | SYSTOLIC BLOOD PRESSURE: 134 MMHG | OXYGEN SATURATION: 99 % | RESPIRATION RATE: 16 BRPM | HEART RATE: 55 BPM

## 2021-07-26 DIAGNOSIS — Z98.89 OTHER SPECIFIED POSTPROCEDURAL STATES: Chronic | ICD-10-CM

## 2021-07-26 DIAGNOSIS — Z98.82 BREAST IMPLANT STATUS: Chronic | ICD-10-CM

## 2021-07-26 DIAGNOSIS — Z98.891 HISTORY OF UTERINE SCAR FROM PREVIOUS SURGERY: Chronic | ICD-10-CM

## 2021-07-26 LAB — SARS-COV-2 RNA SPEC QL NAA+PROBE: SIGNIFICANT CHANGE UP

## 2021-07-26 PROCEDURE — 94640 AIRWAY INHALATION TREATMENT: CPT

## 2021-07-26 PROCEDURE — U0005: CPT

## 2021-07-26 PROCEDURE — U0003: CPT

## 2021-07-26 PROCEDURE — 99284 EMERGENCY DEPT VISIT MOD MDM: CPT | Mod: 25

## 2021-07-26 PROCEDURE — 99285 EMERGENCY DEPT VISIT HI MDM: CPT | Mod: 25

## 2021-07-26 PROCEDURE — 71046 X-RAY EXAM CHEST 2 VIEWS: CPT

## 2021-07-26 PROCEDURE — 93010 ELECTROCARDIOGRAM REPORT: CPT

## 2021-07-26 PROCEDURE — 71046 X-RAY EXAM CHEST 2 VIEWS: CPT | Mod: 26

## 2021-07-26 PROCEDURE — 93005 ELECTROCARDIOGRAM TRACING: CPT

## 2021-07-26 RX ORDER — IBUTILIDE FUMARATE 0.1 MG/ML
1 INJECTION, SOLUTION INTRAVENOUS ONCE
Refills: 0 | Status: DISCONTINUED | OUTPATIENT
Start: 2021-07-26 | End: 2021-07-26

## 2021-07-26 RX ORDER — ALBUTEROL 90 UG/1
1 AEROSOL, METERED ORAL EVERY 4 HOURS
Refills: 0 | Status: DISCONTINUED | OUTPATIENT
Start: 2021-07-26 | End: 2021-07-26

## 2021-07-26 RX ORDER — IPRATROPIUM/ALBUTEROL SULFATE 18-103MCG
3 AEROSOL WITH ADAPTER (GRAM) INHALATION EVERY 6 HOURS
Refills: 0 | Status: DISCONTINUED | OUTPATIENT
Start: 2021-07-26 | End: 2021-07-29

## 2021-07-26 RX ORDER — IPRATROPIUM/ALBUTEROL SULFATE 18-103MCG
3 AEROSOL WITH ADAPTER (GRAM) INHALATION ONCE
Refills: 0 | Status: COMPLETED | OUTPATIENT
Start: 2021-07-26 | End: 2021-07-26

## 2021-07-26 RX ORDER — TIOTROPIUM BROMIDE 18 UG/1
1 CAPSULE ORAL; RESPIRATORY (INHALATION) DAILY
Refills: 0 | Status: DISCONTINUED | OUTPATIENT
Start: 2021-07-26 | End: 2021-07-26

## 2021-07-26 RX ADMIN — Medication 3 MILLILITER(S): at 09:28

## 2021-07-26 RX ADMIN — Medication 40 MILLIGRAM(S): at 09:13

## 2021-07-26 NOTE — ED PROVIDER NOTE - NSFOLLOWUPINSTRUCTIONS_ED_ALL_ED_FT
Please follow up with your primary care doctor within 1 week.  Please follow up with your pulmonologist within 1 week    *Bring all printed lab/test results to your appointment(s).*    Continue all home medications as prescribed    Take steroids as prescribed (Please read all medication information/instructions).    We recommend you receive one of the COVID-19 vaccinations when you are feeling better. There are numerous sites available throughout the state administering the vaccines    Return to the ED for worsening shortness of breath, chest discomfort, palpitations, dizziness, loss of consciousness, or any other concerns. Please follow up with your primary care doctor within 1 week.  Please follow up with your pulmonologist within 1 week    *Bring all printed lab/test results to your appointment(s).*    Your COVID-19 swab did not result during your visit. For results, you may call [phone].    Continue all home medications as prescribed    Take steroids as prescribed (Please read all medication information/instructions).    We recommend you receive one of the COVID-19 vaccinations when you are feeling better. There are numerous sites available throughout the state administering the vaccines.    Return to the ED for worsening shortness of breath, chest discomfort, palpitations, dizziness, loss of consciousness, or any other concerns.

## 2021-07-26 NOTE — ED PROVIDER NOTE - PATIENT PORTAL LINK FT
You can access the FollowMyHealth Patient Portal offered by [facility] by registering at the following website: [email or web address]. By joining [employer]’s FollowMyHealth portal, you will also be able to view your health information using other applications (apps) compatible with our system.

## 2021-07-26 NOTE — ED PROVIDER NOTE - PROGRESS NOTE DETAILS
pt reports symptom improvement after neb and steroids. pt counseled on compliance with asthma meds, instructed to take steroids as prescribed. recommended vaccine administration when feeling better. Will dc with follow up. Discussed plan and return precautions with patient who understands and agrees. All questions answered. - Tobias Turner PA-C

## 2021-07-26 NOTE — ED PROVIDER NOTE - ATTENDING CONTRIBUTION TO CARE
RGUJRAL 39yo f hx Asthma, COVID last year, non smoker presents with chest tightness. States she has chronic symptoms since COVID last year, was not hospitalized. Today checked her pulse ox which read 70 that prompted her to come in. Denies any cough, URI, chest pain, palp, fever, chills. + Stuffy nose. Pt travelled to [place] and returned on Monday, denies any exposure. Pt states she feels fine much better after arrival. No sob or wheezing at home. Pt took her inhaler prior to coming. States her asthma is exacerbated with humidity and she feels she needs steroids. Patient is enquiring about the vaccine as well, explained if she is currently symptomatic that she should follow up with resolution of symptoms.  On exam, Patient is awake,alert,oriented x 3. Patient is well appearing and in no acute distress. Patient's chest is clear to ausculation, +s1s2. Abdomen is soft nd/nt +BS. Extremity with no swelling or calf tenderness.   Check xray chest, EKG and duonebs. Pt states she has been more aware of her symptoms since COVID so she wanted to come in for evaluation.

## 2021-07-26 NOTE — ED PROVIDER NOTE - PHYSICAL EXAMINATION
Patient is awake, alert and oriented x 3.  Patient is well appearing and in no acute distress.  NCAT, PERRL, EOMI.  Neck is supple, No LAD.  Lungs are CTA B/L,+S1S2 no murmurs,  Abdomen:Soft nd/nt+bs no rebound or guarding.  Extremity no edema or calf tender.  Skin with no rash.  Neuro CN3-12 intact. Strength 5/5 in upper and lower extremities. Nml Sensation.Gait normal.

## 2021-07-26 NOTE — ED PROVIDER NOTE - OBJECTIVE STATEMENT
40y f PMhx HTN, Asthma p/w SOB and low pulse ox reading at home. pt reports SPO2 of 70% today at home, came to ED concern for hypoxia. reports chest tightness x1 year after having COVID, states her chest feels tight, but denies worsening symptoms today. Takes combo albuterol/steroid inhaler for asthma. Cites anxiety about living alone, has not received COVID vaccine, would like vaccine today. +recent travel to [place] (returned 1 week ago). --- 40y f PMhx HTN, Asthma p/w SOB and low pulse ox reading at home. pt reports SPO2 of 70% today at home, came to ED concern for hypoxia. reports chest tightness x1 year after having COVID, states her chest feels tight, but denies worsening symptoms today. Takes combo albuterol/steroid inhaler for asthma. Cites anxiety about living alone, has not received COVID vaccine, would like vaccine today. +recent travel to [place] (returned 1 week ago). Denies cough, CP, body aches, loss of smell taste, runny nose, congestion. Denies unilateral extremity swelling, hemoptysis, prior DVT/PE, or recent immobilization. Nonsmoker. no OCP use

## 2021-07-26 NOTE — ED ADULT NURSE NOTE - NSIMPLEMENTINTERV_GEN_ALL_ED
Implemented All Universal Safety Interventions:  [place] to call system. Call bell, personal items and telephone within reach. Instruct patient to call for assistance. Room bathroom lighting operational. Non-slip footwear when patient is off stretcher. Physically safe environment: no spills, clutter or unnecessary equipment. Stretcher in lowest position, wheels locked, appropriate side rails in place.

## 2021-07-29 ENCOUNTER — APPOINTMENT (OUTPATIENT)
Dept: UROGYNECOLOGY | Facility: CLINIC | Age: 41
End: 2021-07-29
Payer: COMMERCIAL

## 2021-07-29 ENCOUNTER — OUTPATIENT (OUTPATIENT)
Dept: OUTPATIENT SERVICES | Facility: HOSPITAL | Age: 41
LOS: 1 days | End: 2021-07-29
Payer: COMMERCIAL

## 2021-07-29 VITALS — TEMPERATURE: 97.2 F | DIASTOLIC BLOOD PRESSURE: 80 MMHG | SYSTOLIC BLOOD PRESSURE: 120 MMHG

## 2021-07-29 DIAGNOSIS — Z98.89 OTHER SPECIFIED POSTPROCEDURAL STATES: Chronic | ICD-10-CM

## 2021-07-29 DIAGNOSIS — Z98.891 HISTORY OF UTERINE SCAR FROM PREVIOUS SURGERY: Chronic | ICD-10-CM

## 2021-07-29 DIAGNOSIS — R39.15 URGENCY OF URINATION: ICD-10-CM

## 2021-07-29 DIAGNOSIS — Z98.82 BREAST IMPLANT STATUS: Chronic | ICD-10-CM

## 2021-07-29 DIAGNOSIS — Z01.818 ENCOUNTER FOR OTHER PREPROCEDURAL EXAMINATION: ICD-10-CM

## 2021-07-29 PROCEDURE — 51729 CYSTOMETROGRAM W/VP&UP: CPT

## 2021-07-29 PROCEDURE — 51784 ANAL/URINARY MUSCLE STUDY: CPT

## 2021-07-29 PROCEDURE — 51797 INTRAABDOMINAL PRESSURE TEST: CPT

## 2021-07-29 PROCEDURE — 51729 CYSTOMETROGRAM W/VP&UP: CPT | Mod: 26

## 2021-07-29 PROCEDURE — 51797 INTRAABDOMINAL PRESSURE TEST: CPT | Mod: 26

## 2021-07-29 PROCEDURE — 51784 ANAL/URINARY MUSCLE STUDY: CPT | Mod: 26

## 2021-08-03 ENCOUNTER — APPOINTMENT (OUTPATIENT)
Dept: UROGYNECOLOGY | Facility: CLINIC | Age: 41
End: 2021-08-03
Payer: COMMERCIAL

## 2021-08-03 PROCEDURE — 99214 OFFICE O/P EST MOD 30 MIN: CPT

## 2021-08-03 NOTE — DISCUSSION/SUMMARY
[FreeTextEntry1] : \par 1. CARLTON: Computer generated images were used to demonstrate stress urinary incontinence and treatment options. We reviewed management options for stress urinary incontinence including: observation, pelvic floor exercises, continence devices, periurethral bulking agents,  and surgical management. We reviewed risks and benefits of each procedure. Specifically, we discussed the risks and benefits of mesh and reviewed the FDA notification on transvaginal mesh. She desires surgery with a MUS. She does not desire future childbearing or pregnancy. She will RTO for preop counseling. IUGA information on MUS provided to her as well. \par \par 2. Overactive bladder, UUI: \par -She denies bothersome symptoms today\par -continue behavioral and fluid modifications \par -she was counseled that the MUS is not a treatment for OAB and UUI and expressed understanding of this.

## 2021-08-03 NOTE — HISTORY OF PRESENT ILLNESS
[Urinary Frequency] : no [Urinary Frequency More Than Twice At Night (Nocturia)] : no nocturia [] : years ago [Unable To Restrain Bowel Movement] : mild [Feelings Of Urinary Urgency] : moderate [Urinary Tract Infection] : moderate [de-identified] : reports very bothersome, now more than urge [de-identified] : denies bother [FreeTextEntry1] : Stella presents for follow up and discussion of urodynamic test results. She underwent urodynamics which revealed Stress urinary incontinence. We discussed that although her test was negative for detrusor overactivity, she may still have an overactive bladder. For her OAB symptoms she has been following behavioral and fluid modifications with improvement in symptoms. She reports more bothersome CARLTON symptoms and desires treatment. \par \par Urodynamics: No DO. +leak at 200mL and above with seqeutial coughs. assisted 500cc. PVR: 0mL

## 2021-09-04 ENCOUNTER — EMERGENCY (EMERGENCY)
Facility: HOSPITAL | Age: 41
LOS: 1 days | Discharge: HOME | End: 2021-09-04
Attending: EMERGENCY MEDICINE
Payer: COMMERCIAL

## 2021-09-04 VITALS
WEIGHT: 169.98 LBS | DIASTOLIC BLOOD PRESSURE: 85 MMHG | TEMPERATURE: 98 F | SYSTOLIC BLOOD PRESSURE: 127 MMHG | RESPIRATION RATE: 15 BRPM | HEART RATE: 56 BPM | OXYGEN SATURATION: 98 % | HEIGHT: 67 IN

## 2021-09-04 VITALS
RESPIRATION RATE: 18 BRPM | SYSTOLIC BLOOD PRESSURE: 130 MMHG | TEMPERATURE: 98 F | OXYGEN SATURATION: 98 % | HEART RATE: 60 BPM | DIASTOLIC BLOOD PRESSURE: 81 MMHG

## 2021-09-04 DIAGNOSIS — Z98.82 BREAST IMPLANT STATUS: Chronic | ICD-10-CM

## 2021-09-04 DIAGNOSIS — Z98.891 HISTORY OF UTERINE SCAR FROM PREVIOUS SURGERY: Chronic | ICD-10-CM

## 2021-09-04 DIAGNOSIS — Z98.89 OTHER SPECIFIED POSTPROCEDURAL STATES: Chronic | ICD-10-CM

## 2021-09-04 LAB
RAPID RVP RESULT: SIGNIFICANT CHANGE UP
SARS-COV-2 RNA SPEC QL NAA+PROBE: SIGNIFICANT CHANGE UP

## 2021-09-04 PROCEDURE — 71045 X-RAY EXAM CHEST 1 VIEW: CPT

## 2021-09-04 PROCEDURE — 99284 EMERGENCY DEPT VISIT MOD MDM: CPT

## 2021-09-04 PROCEDURE — 0225U NFCT DS DNA&RNA 21 SARSCOV2: CPT

## 2021-09-04 PROCEDURE — 99283 EMERGENCY DEPT VISIT LOW MDM: CPT | Mod: 25

## 2021-09-04 PROCEDURE — 71045 X-RAY EXAM CHEST 1 VIEW: CPT | Mod: 26

## 2021-09-04 RX ADMIN — Medication 100 MILLIGRAM(S): at 14:21

## 2021-09-04 NOTE — ED PROVIDER NOTE - NSFOLLOWUPINSTRUCTIONS_ED_ALL_ED_FT
You came into the hospital because you had gurgling sounds. An Xray of your chest was done which was normal. A viral swab was done which will take a few hours to result, the hospital will call you with the results. You can also call the hospital tomorrow to get the results. You were given robitussin and your symptoms improved. Please continue taking robitussin as needed. Please follow up with your pulmonologist 1-2 weeks after discharge. Please come back to the emergency room if you have fevers, chills, cough, shortness of breath.

## 2021-09-04 NOTE — ED PROVIDER NOTE - NSICDXPASTMEDICALHX_GEN_ALL_CORE_FT
PAST MEDICAL HISTORY:  Asthma     Asthma     Atrophy of kidney     Hypertension     Hypothyroid

## 2021-09-04 NOTE — ED PROVIDER NOTE - OBJECTIVE STATEMENT
41F w/ PMH of HTN, Asthma, Hypothyroid presents to ED for gurgling sensation in her chest. Happens when she takes a breathe. Denies fever, chills, cough, SOB, chest pain, Abd pain, N/V/D. States that she had this sensation last year when she had COVID, she had SOB but did not go to the hospital at that time. She follows with a pulmonologist and states she had scarring in the lung on CT scan.

## 2021-09-04 NOTE — ED PROVIDER NOTE - NS ED ROS FT
CONSTITUTIONAL:  No weight loss, fever, chills, weakness or fatigue.  HEENT:  Eyes:  No visual loss, blurred vision, double vision or yellow sclerae. Ears, Nose, Throat:  No hearing loss, sneezing, congestion, runny nose or sore throat.  SKIN:  No rash or itching.  CARDIOVASCULAR:  No chest pain, chest pressure or chest discomfort. No palpitations.  RESPIRATORY: +gurgling sensation in chest No shortness of breath, cough or sputum.  GASTROINTESTINAL:  No anorexia, nausea, vomiting or diarrhea. No abdominal pain or blood.  GENITOURINARY:  Denies hematuria, dysuria.   NEUROLOGICAL:  No headache, dizziness, syncope, paralysis, ataxia, numbness or tingling in the extremities. No change in bowel or bladder control.  MUSCULOSKELETAL:  No muscle, back pain, joint pain or stiffness.  HEMATOLOGIC:  No anemia, bleeding or bruising.  LYMPHATICS:  No enlarged nodes.   PSYCHIATRIC:  No history of depression or anxiety.  ENDOCRINOLOGIC:  No reports of sweating, cold or heat intolerance. No polyuria or polydipsia.  ALLERGIES:  No history of asthma, hives, eczema or rhinitis.

## 2021-09-04 NOTE — ED ADULT NURSE NOTE - OBJECTIVE STATEMENT
42 yo female complaining of "I am feeling like it is something bubbling on my chest, just like when I had covid back in march, it is like coming up, like a burp" pt denies chest pain, nausea and vomiting, alerted and oriented x3, no sings of acute distress noted at this moment, vitals WNL. pt ambulates without assistance, heart sounds normal. lungs clear, abdomen soft, non distended. Will continue to monitor closely.

## 2021-09-04 NOTE — ED PROVIDER NOTE - PHYSICAL EXAMINATION
T(C): 36.8 (09-04-21 @ 11:50), Max: 36.8 (09-04-21 @ 11:50)  HR: 56 (09-04-21 @ 11:50) (56 - 56)  BP: 127/85 (09-04-21 @ 11:50) (127/85 - 127/85)  RR: 15 (09-04-21 @ 11:50) (15 - 15)  SpO2: 98% (09-04-21 @ 11:50) (98% - 98%)    GENERAL: Laying comfortably, NAD  EYES: EOMI, PERRL, no scleral icterus  NECK: No JVD  LUNG: Clear to auscultation bilaterally; No wheeze, crackles or rhonci  HEART: Regular rate and rhythm; No murmurs, rubs, or gallops  ABDOMEN: Soft, Nontender, Nondistended  EXTREMITIES:  No LE edema, Peripheral Pulses intact, No clubbing, cyanosis, or edema  PSYCH: AAOx3  NEUROLOGY: non-focal, strength 5/5 in all extremities, sensation intact  SKIN: No rashes or lesions

## 2021-09-04 NOTE — ED PROVIDER NOTE - PATIENT PORTAL LINK FT
You can access the FollowMyHealth Patient Portal offered by [facility] by registering at the following website: [email or web address]. By joining [facility]’s FollowMyHealth portal, you will also be able to view your health information using other applications (apps) compatible with our system.

## 2021-09-04 NOTE — ED PROVIDER NOTE - CLINICAL SUMMARY MEDICAL DECISION MAKING FREE TEXT BOX
41 F w/ PMH of asthma, HTN, hypothyroid coming in for gurgly breath sounds when she takes a breath. No other symptoms. Lungs clear to auscultation, no crackles or wheezing. Pt appears anxious on exam, reminds her of when she has COVID. Will get CXR, RVP and give robutussin. 41 F w/ PMH of asthma, HTN, hypothyroid coming in for gurgly breath sounds when she takes a breath. No other symptoms. Lungs clear to auscultation, no crackles or wheezing. Pt appears anxious on exam, reminds her of when she has COVID. Will get CXR, RVP and give robutussin.  Panfilo: 41 year old female here with "gurgling sounds" in chest.  no other stymptoms. lungs cta b/l, saturating well, no fever, no chills. will  get cxr, covid test, symptomatic relief. d/c home

## 2021-09-14 ENCOUNTER — APPOINTMENT (OUTPATIENT)
Dept: UROGYNECOLOGY | Facility: CLINIC | Age: 41
End: 2021-09-14
Payer: COMMERCIAL

## 2021-09-15 ENCOUNTER — NON-APPOINTMENT (OUTPATIENT)
Age: 41
End: 2021-09-15

## 2021-09-15 ENCOUNTER — APPOINTMENT (OUTPATIENT)
Dept: UROGYNECOLOGY | Facility: CLINIC | Age: 41
End: 2021-09-15
Payer: COMMERCIAL

## 2021-09-15 PROCEDURE — 99214 OFFICE O/P EST MOD 30 MIN: CPT | Mod: GC

## 2021-09-15 NOTE — HISTORY OF PRESENT ILLNESS
[de-identified] : reports very bothersome, now more than urge [de-identified] : denies bother [FreeTextEntry1] : \par Stella presents for follow up of mixed urinary incontinence, stress predominant. We reviewed management options, and she continues to desire surgical management. She understands surgery is not intended to treat urgency, frequency, or urgency incontinence. She no longer desires future childbearing. \par \par Preop Eval:\par UDS: +CARLTON from 200-500ml, no ISD, no DO, PVR 0\par \par PMH: hypothyroid, asthma, HTN, left atrophic kidney (only 10% function)\par PSH: csection, breast augmentation\par Social History: nonsmoker, employed

## 2021-09-15 NOTE — DISCUSSION/SUMMARY
[FreeTextEntry1] : After extensive counseling regarding surgical and non surgical options, the patient has elected for a sling with possible anterior vaginal repair. We discussed surgery is not intended to treat urgency, frequency, or urgency incontinence. We also discussed that if she does become pregnant in the future that the sling will not impact the pregnancy, but the pregnancy/childbirth may impact the effectiveness of the sling. We also discussed that she could still have a vaginal delivery with a sling in place and that she does not need a  delivery unless there is an obstetric indication. \par \par We reviewed risks to the procedure including, but not limited to: bleeding, infection, pain, urinary retention requiring an indwelling catheter, persistence or recurrence of stress incontinence, worsening of stress incontinence symptoms, failure of procedure to alleviate symptoms, development or worsening of overactive bladder or urge incontinence, voiding dysfunction, dyspareunia. We also extensively reviewed the risk of  injury to the bladder, ureters, urethra, vagina, rectum, and bowel. We also discussed the risk of sling mesh exposure, fistula formation, neuropathy, erosion, pain, and need for reoperation.  Risks were explained in layman's terms. She expressed understanding of these risks and continues to desire the planned surgical procedure. We reviewed her hospital stay as well as preoperative and postoperative instructions. She is aware that she must be NPO after midnight prior to the surgery. \par \par Consent was signed in the office for pelvic exam under anesthesia, midurethral sling with mesh, cystoscopy, possible anterior repair. An additional mesh specific consent was also reviewed and signed.

## 2021-09-15 NOTE — PHYSICAL EXAM
[FreeTextEntry1] : \par \par General: Well, appearing. Alert and orientated. No acute distress\par HEENT: Normocephalic, atraumatic and extraocular muscles appear to be intact \par Neck: Full range of motion, no obvious lymphadenopathy, deformities, or masses noted \par Respiratory: Speaking in full sentences comfortably, normal work of breathing and no cough during visit\par Musculoskeletal: active full range of motion in extremities \par Extremities: No upper extremity edema noted\par Skin: no obvious rash or skin lesions\par Neuro: Orientated X 3, speech is fluent, normal rate\par Psych: Normal mood and affect \par \par \par Patient declined pelvic exam.  [Tenderness] : ~T no ~M abdominal tenderness observed [Distended] : not distended

## 2021-09-17 LAB — BACTERIA UR CULT: NORMAL

## 2021-11-18 ENCOUNTER — OUTPATIENT (OUTPATIENT)
Dept: OUTPATIENT SERVICES | Facility: HOSPITAL | Age: 41
LOS: 1 days | End: 2021-11-18
Payer: COMMERCIAL

## 2021-11-18 VITALS
DIASTOLIC BLOOD PRESSURE: 81 MMHG | HEIGHT: 67 IN | OXYGEN SATURATION: 100 % | TEMPERATURE: 97 F | SYSTOLIC BLOOD PRESSURE: 124 MMHG | WEIGHT: 153 LBS | HEART RATE: 58 BPM | RESPIRATION RATE: 16 BRPM

## 2021-11-18 DIAGNOSIS — Z98.891 HISTORY OF UTERINE SCAR FROM PREVIOUS SURGERY: Chronic | ICD-10-CM

## 2021-11-18 DIAGNOSIS — I10 ESSENTIAL (PRIMARY) HYPERTENSION: ICD-10-CM

## 2021-11-18 DIAGNOSIS — E03.9 HYPOTHYROIDISM, UNSPECIFIED: ICD-10-CM

## 2021-11-18 DIAGNOSIS — Z01.818 ENCOUNTER FOR OTHER PREPROCEDURAL EXAMINATION: ICD-10-CM

## 2021-11-18 DIAGNOSIS — N39.3 STRESS INCONTINENCE (FEMALE) (MALE): ICD-10-CM

## 2021-11-18 DIAGNOSIS — Z98.82 BREAST IMPLANT STATUS: Chronic | ICD-10-CM

## 2021-11-18 DIAGNOSIS — Z98.89 OTHER SPECIFIED POSTPROCEDURAL STATES: Chronic | ICD-10-CM

## 2021-11-18 LAB
ANION GAP SERPL CALC-SCNC: 11 MMOL/L — SIGNIFICANT CHANGE UP (ref 5–17)
BUN SERPL-MCNC: 13 MG/DL — SIGNIFICANT CHANGE UP (ref 7–23)
CALCIUM SERPL-MCNC: 9.1 MG/DL — SIGNIFICANT CHANGE UP (ref 8.4–10.5)
CHLORIDE SERPL-SCNC: 106 MMOL/L — SIGNIFICANT CHANGE UP (ref 96–108)
CO2 SERPL-SCNC: 23 MMOL/L — SIGNIFICANT CHANGE UP (ref 22–31)
CREAT SERPL-MCNC: 0.8 MG/DL — SIGNIFICANT CHANGE UP (ref 0.5–1.3)
GLUCOSE SERPL-MCNC: 81 MG/DL — SIGNIFICANT CHANGE UP (ref 70–99)
HCT VFR BLD CALC: 39.2 % — SIGNIFICANT CHANGE UP (ref 34.5–45)
HGB BLD-MCNC: 12.7 G/DL — SIGNIFICANT CHANGE UP (ref 11.5–15.5)
MCHC RBC-ENTMCNC: 32.4 GM/DL — SIGNIFICANT CHANGE UP (ref 32–36)
MCHC RBC-ENTMCNC: 32.6 PG — SIGNIFICANT CHANGE UP (ref 27–34)
MCV RBC AUTO: 100.8 FL — HIGH (ref 80–100)
NRBC # BLD: 0 /100 WBCS — SIGNIFICANT CHANGE UP (ref 0–0)
PLATELET # BLD AUTO: 299 K/UL — SIGNIFICANT CHANGE UP (ref 150–400)
POTASSIUM SERPL-MCNC: 3.9 MMOL/L — SIGNIFICANT CHANGE UP (ref 3.5–5.3)
POTASSIUM SERPL-SCNC: 3.9 MMOL/L — SIGNIFICANT CHANGE UP (ref 3.5–5.3)
RBC # BLD: 3.89 M/UL — SIGNIFICANT CHANGE UP (ref 3.8–5.2)
RBC # FLD: 13.1 % — SIGNIFICANT CHANGE UP (ref 10.3–14.5)
SODIUM SERPL-SCNC: 140 MMOL/L — SIGNIFICANT CHANGE UP (ref 135–145)
WBC # BLD: 9.02 K/UL — SIGNIFICANT CHANGE UP (ref 3.8–10.5)
WBC # FLD AUTO: 9.02 K/UL — SIGNIFICANT CHANGE UP (ref 3.8–10.5)

## 2021-11-18 PROCEDURE — 85027 COMPLETE CBC AUTOMATED: CPT

## 2021-11-18 PROCEDURE — 80048 BASIC METABOLIC PNL TOTAL CA: CPT

## 2021-11-18 PROCEDURE — 87086 URINE CULTURE/COLONY COUNT: CPT

## 2021-11-18 PROCEDURE — G0463: CPT

## 2021-11-18 RX ORDER — LIDOCAINE HCL 20 MG/ML
0.2 VIAL (ML) INJECTION ONCE
Refills: 0 | Status: DISCONTINUED | OUTPATIENT
Start: 2021-12-02 | End: 2021-12-16

## 2021-11-18 RX ORDER — SODIUM CHLORIDE 9 MG/ML
3 INJECTION INTRAMUSCULAR; INTRAVENOUS; SUBCUTANEOUS EVERY 8 HOURS
Refills: 0 | Status: DISCONTINUED | OUTPATIENT
Start: 2021-12-02 | End: 2021-12-16

## 2021-11-18 NOTE — H&P PST ADULT - HEALTH CARE MAINTENANCE
Follows up PCP every 6 months  Declines flu vaccine  Uptodate with women's health Follows up PCP every 6 months  Declines flu vaccine  Upto date with women's health

## 2021-11-18 NOTE — H&P PST ADULT - NSANTHOSAYNRD_GEN_A_CORE
No. MATI screening performed.  STOP BANG Legend: 0-2 = LOW Risk; 3-4 = INTERMEDIATE Risk; 5-8 = HIGH Risk

## 2021-11-18 NOTE — H&P PST ADULT - HISTORY OF PRESENT ILLNESS
This is a 41 year old female with past medical history of HTN, Asthma ( no recent attacks, occasional use of inhalers), hypothyroids     41F w/ PMH of HTN, Asthma, Hypothyroid presents to ED for gurgling sensation in her chest. Happens when she takes a breathe. Denies fever, chills, cough, SOB, chest pain, Abd pain, N/V/D. States that she had this sensation last year when she had COVID, she had SOB but did not go to the hospital at that time. She follows with a pulmonologist and states she had scarring in the lung on CT scan    Report having occassional urinary incontinence during exertional actvity. Scheduled for mid urthreal sling, cystosocpy on 12/2/21 with Dr. Glass    **Covid swab 11/30 @ [facility]  ** Covid vaccinated Pfizer series  ** Reprot havging + Covid infection - March 2020 ( Body aches, weakness, low grade fever)- Chest x-ray performed Sept 2021- Clear lungs   This is a 41 year old female with past medical history of HTN, Asthma ( no recent attacks, occasional use of inhalers), hypothyroidism. Reports having occasional urinary incontinence during exertional activity. Denies any dysruis or nocturia.  Scheduled for Mid- Urethral sling, Cystoscopy on 12/2/21 with Dr. Glass    **Covid swab 11/30 @ [facility]  ** Covid vaccinated Pfizer series  ** Reprot having + Covid infection - March 2020 ( Body aches, weakness, low grade fever)- Chest x-ray performed Sept 2021- Clear lungs

## 2021-11-18 NOTE — H&P PST ADULT - PROBLEM SELECTOR PLAN 1
Scheduled for Cytoscopy, midurthral sling on 12/2/21 with Dr. Glass  Preop instructions given  Labs CBC BMP UC perfomred in PST   Covid swab on 11/30 Scheduled for Cytoscopy, midurethral sling on 12/2/21 with Dr. Glass  Preop instructions given  Labs CBC BMP UC performed in PST   Covid swab on 11/30

## 2021-11-19 LAB
CULTURE RESULTS: SIGNIFICANT CHANGE UP
SPECIMEN SOURCE: SIGNIFICANT CHANGE UP

## 2021-11-30 ENCOUNTER — OUTPATIENT (OUTPATIENT)
Dept: OUTPATIENT SERVICES | Facility: HOSPITAL | Age: 41
LOS: 1 days | End: 2021-11-30
Payer: COMMERCIAL

## 2021-11-30 DIAGNOSIS — Z98.891 HISTORY OF UTERINE SCAR FROM PREVIOUS SURGERY: Chronic | ICD-10-CM

## 2021-11-30 DIAGNOSIS — Z98.82 BREAST IMPLANT STATUS: Chronic | ICD-10-CM

## 2021-11-30 DIAGNOSIS — Z11.52 ENCOUNTER FOR SCREENING FOR COVID-19: ICD-10-CM

## 2021-11-30 DIAGNOSIS — Z98.89 OTHER SPECIFIED POSTPROCEDURAL STATES: Chronic | ICD-10-CM

## 2021-11-30 LAB — SARS-COV-2 RNA SPEC QL NAA+PROBE: SIGNIFICANT CHANGE UP

## 2021-11-30 PROCEDURE — C9803: CPT

## 2021-11-30 PROCEDURE — U0003: CPT

## 2021-11-30 PROCEDURE — U0005: CPT

## 2021-12-01 ENCOUNTER — TRANSCRIPTION ENCOUNTER (OUTPATIENT)
Age: 41
End: 2021-12-01

## 2021-12-02 ENCOUNTER — APPOINTMENT (OUTPATIENT)
Dept: UROGYNECOLOGY | Facility: HOSPITAL | Age: 41
End: 2021-12-02
Payer: COMMERCIAL

## 2021-12-02 ENCOUNTER — OUTPATIENT (OUTPATIENT)
Dept: OUTPATIENT SERVICES | Facility: HOSPITAL | Age: 41
LOS: 1 days | End: 2021-12-02
Payer: COMMERCIAL

## 2021-12-02 VITALS
OXYGEN SATURATION: 98 % | SYSTOLIC BLOOD PRESSURE: 121 MMHG | DIASTOLIC BLOOD PRESSURE: 66 MMHG | RESPIRATION RATE: 17 BRPM | HEART RATE: 45 BPM

## 2021-12-02 VITALS
WEIGHT: 153 LBS | OXYGEN SATURATION: 98 % | SYSTOLIC BLOOD PRESSURE: 108 MMHG | RESPIRATION RATE: 16 BRPM | TEMPERATURE: 98 F | DIASTOLIC BLOOD PRESSURE: 71 MMHG | HEART RATE: 45 BPM | HEIGHT: 67 IN

## 2021-12-02 DIAGNOSIS — Z98.82 BREAST IMPLANT STATUS: Chronic | ICD-10-CM

## 2021-12-02 DIAGNOSIS — Z98.89 OTHER SPECIFIED POSTPROCEDURAL STATES: Chronic | ICD-10-CM

## 2021-12-02 DIAGNOSIS — Z98.891 HISTORY OF UTERINE SCAR FROM PREVIOUS SURGERY: Chronic | ICD-10-CM

## 2021-12-02 DIAGNOSIS — N39.3 STRESS INCONTINENCE (FEMALE) (MALE): ICD-10-CM

## 2021-12-02 PROCEDURE — C1771: CPT

## 2021-12-02 PROCEDURE — 57288 REPAIR BLADDER DEFECT: CPT

## 2021-12-02 RX ORDER — ONDANSETRON 8 MG/1
4 TABLET, FILM COATED ORAL ONCE
Refills: 0 | Status: COMPLETED | OUTPATIENT
Start: 2021-12-02 | End: 2021-12-02

## 2021-12-02 RX ORDER — FENTANYL CITRATE 50 UG/ML
25 INJECTION INTRAVENOUS
Refills: 0 | Status: DISCONTINUED | OUTPATIENT
Start: 2021-12-02 | End: 2021-12-02

## 2021-12-02 RX ORDER — CEFAZOLIN SODIUM 1 G
2000 VIAL (EA) INJECTION ONCE
Refills: 0 | Status: COMPLETED | OUTPATIENT
Start: 2021-12-02 | End: 2021-12-02

## 2021-12-02 RX ORDER — OXYCODONE HYDROCHLORIDE 5 MG/1
5 TABLET ORAL ONCE
Refills: 0 | Status: DISCONTINUED | OUTPATIENT
Start: 2021-12-02 | End: 2021-12-02

## 2021-12-02 RX ADMIN — OXYCODONE HYDROCHLORIDE 5 MILLIGRAM(S): 5 TABLET ORAL at 11:45

## 2021-12-02 RX ADMIN — SODIUM CHLORIDE 3 MILLILITER(S): 9 INJECTION INTRAMUSCULAR; INTRAVENOUS; SUBCUTANEOUS at 14:22

## 2021-12-02 RX ADMIN — OXYCODONE HYDROCHLORIDE 5 MILLIGRAM(S): 5 TABLET ORAL at 12:15

## 2021-12-02 RX ADMIN — ONDANSETRON 4 MILLIGRAM(S): 8 TABLET, FILM COATED ORAL at 14:21

## 2021-12-02 NOTE — ASU PATIENT PROFILE, ADULT - FALL HARM RISK - UNIVERSAL INTERVENTIONS
Bed in lowest position, wheels locked, appropriate side rails in place/Call bell, personal items and telephone in reach/Instruct patient to call for assistance before getting out of bed or chair/Non-slip footwear when patient is out of bed/[place] to call system/Physically safe environment - no spills, clutter or unnecessary equipment/Purposeful Proactive Rounding/Room/bathroom lighting operational, light cord in reach

## 2021-12-02 NOTE — ASU DISCHARGE PLAN (ADULT/PEDIATRIC) - CARE PROVIDER_API CALL
Michaelle Glass (MD)  Female Pelvic MedReconst Surg; Obstetrics and Gynecology  [address], [address]  [place] [address]  Phone: [phone]  Fax: [phone]  Established Patient  Follow Up Time:

## 2021-12-02 NOTE — BRIEF OPERATIVE NOTE - NSICDXBRIEFPROCEDURE_GEN_ALL_CORE_FT
PROCEDURES:  Creation, midurethral sling, retropubic approach 02-Dec-2021 11:25:10 Advantage fit Bobbi Alejandra  Cystoscopy 02-Dec-2021 11:25:27  Bobbi Alejandra

## 2021-12-02 NOTE — PRE-ANESTHESIA EVALUATION ADULT - NSANTHPMHFT_GEN_ALL_CORE
42F 41 year old female with past medical history of HTN, Asthma (almost daily inhaler use since had covid in 3/2020), hypothyroidism, atrophic kidney ;  no recent increased frequency of inhaler use. no increased SOB, coughing/wheezing or sputum   METs>4

## 2021-12-02 NOTE — ASU DISCHARGE PLAN (ADULT/PEDIATRIC) - CALL YOUR DOCTOR IF YOU HAVE ANY OF THE FOLLOWING:
Pain not relieved by Medications/Fever greater than (need to indicate Fahrenheit or Celsius)/Wound/Surgical Site with redness, or foul smelling discharge or pus/Unable to urinate

## 2021-12-02 NOTE — ASU DISCHARGE PLAN (ADULT/PEDIATRIC) - MEDICATION INSTRUCTIONS
You can take up to 600mg Ibuprofen every 6 hours and/or Tylenol 975mg every 6 hours. You can take up to 600mg Ibuprofen every 6 hours and/or Tylenol 975mg every 6 hours.nest dose of tylenol @ 4:30 pm

## 2021-12-02 NOTE — CHART NOTE - NSCHARTNOTEFT_GEN_A_CORE
Urogyn Chart Note    Pt eval at bedside for trial of void.  Pain well controlled prior to starting TOV.  Patient has been out of bed ambulating.    Bladder fully drained through powell catheter.  Retrograde filled with 300cc sterile water then clamped.    Patient assisted to restroom and powell removed.  Given necessary time to void (20 minutes).    Patient voided 250cc of 300 urine, passing TOV.  To be discharged home without powell catheter.    JOSE LUIS Kimball, PGY-2

## 2021-12-02 NOTE — BRIEF OPERATIVE NOTE - OPERATION/FINDINGS
No prolapse. On cystoscopy prior to sling placement, bilateral ureteral orifices identified. Clear yellow urine effluxing from left UO, no efflux from right UO consistent with patient's history of atrophic kidney.   On cystoscopy after sling placement, normal bladder mucosa. No mesh, injury, or foreign body noted. Bilateral ureteral orifices identified and efflux seen from left UO only.

## 2021-12-03 ENCOUNTER — NON-APPOINTMENT (OUTPATIENT)
Age: 41
End: 2021-12-03

## 2021-12-03 DIAGNOSIS — G89.18 OTHER ACUTE POSTPROCEDURAL PAIN: ICD-10-CM

## 2021-12-03 RX ORDER — IBUPROFEN 600 MG/1
600 TABLET, FILM COATED ORAL 4 TIMES DAILY
Qty: 28 | Refills: 0 | Status: ACTIVE | COMMUNITY
Start: 2021-12-03 | End: 1900-01-01

## 2021-12-04 ENCOUNTER — EMERGENCY (EMERGENCY)
Facility: HOSPITAL | Age: 41
LOS: 1 days | Discharge: HOME | End: 2021-12-04
Attending: EMERGENCY MEDICINE
Payer: COMMERCIAL

## 2021-12-04 VITALS
RESPIRATION RATE: 18 BRPM | HEART RATE: 53 BPM | HEIGHT: 67 IN | TEMPERATURE: 98 F | OXYGEN SATURATION: 98 % | SYSTOLIC BLOOD PRESSURE: 108 MMHG | DIASTOLIC BLOOD PRESSURE: 68 MMHG | WEIGHT: 149.91 LBS

## 2021-12-04 VITALS
TEMPERATURE: 98 F | SYSTOLIC BLOOD PRESSURE: 128 MMHG | RESPIRATION RATE: 16 BRPM | DIASTOLIC BLOOD PRESSURE: 78 MMHG | HEART RATE: 45 BPM | OXYGEN SATURATION: 98 %

## 2021-12-04 DIAGNOSIS — Z98.891 HISTORY OF UTERINE SCAR FROM PREVIOUS SURGERY: Chronic | ICD-10-CM

## 2021-12-04 DIAGNOSIS — Z98.82 BREAST IMPLANT STATUS: Chronic | ICD-10-CM

## 2021-12-04 DIAGNOSIS — Z98.89 OTHER SPECIFIED POSTPROCEDURAL STATES: Chronic | ICD-10-CM

## 2021-12-04 LAB
ANION GAP SERPL CALC-SCNC: 13 MMOL/L — SIGNIFICANT CHANGE UP (ref 5–17)
APPEARANCE UR: CLEAR — SIGNIFICANT CHANGE UP
BASOPHILS # BLD AUTO: 0.06 K/UL — SIGNIFICANT CHANGE UP (ref 0–0.2)
BASOPHILS NFR BLD AUTO: 0.6 % — SIGNIFICANT CHANGE UP (ref 0–2)
BILIRUB UR-MCNC: NEGATIVE — SIGNIFICANT CHANGE UP
BUN SERPL-MCNC: 15 MG/DL — SIGNIFICANT CHANGE UP (ref 7–23)
CALCIUM SERPL-MCNC: 9.4 MG/DL — SIGNIFICANT CHANGE UP (ref 8.4–10.5)
CHLORIDE SERPL-SCNC: 109 MMOL/L — HIGH (ref 96–108)
CO2 SERPL-SCNC: 21 MMOL/L — LOW (ref 22–31)
COLOR SPEC: SIGNIFICANT CHANGE UP
CREAT SERPL-MCNC: 0.82 MG/DL — SIGNIFICANT CHANGE UP (ref 0.5–1.3)
DIFF PNL FLD: ABNORMAL
EOSINOPHIL # BLD AUTO: 0.32 K/UL — SIGNIFICANT CHANGE UP (ref 0–0.5)
EOSINOPHIL NFR BLD AUTO: 3.2 % — SIGNIFICANT CHANGE UP (ref 0–6)
GLUCOSE SERPL-MCNC: 96 MG/DL — SIGNIFICANT CHANGE UP (ref 70–99)
GLUCOSE UR QL: NEGATIVE — SIGNIFICANT CHANGE UP
HCT VFR BLD CALC: 36.9 % — SIGNIFICANT CHANGE UP (ref 34.5–45)
HGB BLD-MCNC: 11.9 G/DL — SIGNIFICANT CHANGE UP (ref 11.5–15.5)
IMM GRANULOCYTES NFR BLD AUTO: 0.3 % — SIGNIFICANT CHANGE UP (ref 0–1.5)
KETONES UR-MCNC: NEGATIVE — SIGNIFICANT CHANGE UP
LEUKOCYTE ESTERASE UR-ACNC: NEGATIVE — SIGNIFICANT CHANGE UP
LYMPHOCYTES # BLD AUTO: 3.22 K/UL — SIGNIFICANT CHANGE UP (ref 1–3.3)
LYMPHOCYTES # BLD AUTO: 31.8 % — SIGNIFICANT CHANGE UP (ref 13–44)
MCHC RBC-ENTMCNC: 32.2 GM/DL — SIGNIFICANT CHANGE UP (ref 32–36)
MCHC RBC-ENTMCNC: 32.5 PG — SIGNIFICANT CHANGE UP (ref 27–34)
MCV RBC AUTO: 100.8 FL — HIGH (ref 80–100)
MONOCYTES # BLD AUTO: 0.46 K/UL — SIGNIFICANT CHANGE UP (ref 0–0.9)
MONOCYTES NFR BLD AUTO: 4.5 % — SIGNIFICANT CHANGE UP (ref 2–14)
NEUTROPHILS # BLD AUTO: 6.03 K/UL — SIGNIFICANT CHANGE UP (ref 1.8–7.4)
NEUTROPHILS NFR BLD AUTO: 59.6 % — SIGNIFICANT CHANGE UP (ref 43–77)
NITRITE UR-MCNC: NEGATIVE — SIGNIFICANT CHANGE UP
NRBC # BLD: 0 /100 WBCS — SIGNIFICANT CHANGE UP (ref 0–0)
PH UR: 7 — SIGNIFICANT CHANGE UP (ref 5–8)
PLATELET # BLD AUTO: 288 K/UL — SIGNIFICANT CHANGE UP (ref 150–400)
POTASSIUM SERPL-MCNC: 4.2 MMOL/L — SIGNIFICANT CHANGE UP (ref 3.5–5.3)
POTASSIUM SERPL-SCNC: 4.2 MMOL/L — SIGNIFICANT CHANGE UP (ref 3.5–5.3)
PROT UR-MCNC: NEGATIVE — SIGNIFICANT CHANGE UP
RBC # BLD: 3.66 M/UL — LOW (ref 3.8–5.2)
RBC # FLD: 13.4 % — SIGNIFICANT CHANGE UP (ref 10.3–14.5)
SODIUM SERPL-SCNC: 143 MMOL/L — SIGNIFICANT CHANGE UP (ref 135–145)
SP GR SPEC: 1.01 — SIGNIFICANT CHANGE UP (ref 1.01–1.02)
UROBILINOGEN FLD QL: NEGATIVE — SIGNIFICANT CHANGE UP
WBC # BLD: 10.12 K/UL — SIGNIFICANT CHANGE UP (ref 3.8–10.5)
WBC # FLD AUTO: 10.12 K/UL — SIGNIFICANT CHANGE UP (ref 3.8–10.5)

## 2021-12-04 PROCEDURE — 80048 BASIC METABOLIC PNL TOTAL CA: CPT

## 2021-12-04 PROCEDURE — 36415 COLL VENOUS BLD VENIPUNCTURE: CPT

## 2021-12-04 PROCEDURE — 99284 EMERGENCY DEPT VISIT MOD MDM: CPT

## 2021-12-04 PROCEDURE — 81001 URINALYSIS AUTO W/SCOPE: CPT

## 2021-12-04 PROCEDURE — 85025 COMPLETE CBC W/AUTO DIFF WBC: CPT

## 2021-12-04 PROCEDURE — 93010 ELECTROCARDIOGRAM REPORT: CPT

## 2021-12-04 PROCEDURE — 93005 ELECTROCARDIOGRAM TRACING: CPT

## 2021-12-04 PROCEDURE — 87086 URINE CULTURE/COLONY COUNT: CPT

## 2021-12-04 RX ORDER — SODIUM CHLORIDE 9 MG/ML
1000 INJECTION INTRAMUSCULAR; INTRAVENOUS; SUBCUTANEOUS ONCE
Refills: 0 | Status: COMPLETED | OUTPATIENT
Start: 2021-12-04 | End: 2021-12-04

## 2021-12-04 RX ORDER — ACETAMINOPHEN 500 MG
975 TABLET ORAL ONCE
Refills: 0 | Status: COMPLETED | OUTPATIENT
Start: 2021-12-04 | End: 2021-12-04

## 2021-12-04 RX ADMIN — Medication 975 MILLIGRAM(S): at 17:05

## 2021-12-04 RX ADMIN — SODIUM CHLORIDE 1000 MILLILITER(S): 9 INJECTION INTRAMUSCULAR; INTRAVENOUS; SUBCUTANEOUS at 15:54

## 2021-12-04 NOTE — ED ADULT TRIAGE NOTE - CHIEF COMPLAINT QUOTE
s/p surgery for urinary incontinence 3 days ago (slip mesh); now c/o "an object" coming out of her vagina

## 2021-12-04 NOTE — ED PROVIDER NOTE - ATTENDING CONTRIBUTION TO CARE
See MDM above.  Patient with foreign body sensation to vagina status post surgery   will consult gyn  The patient was serially evaluated throughout emergency department course. There was no acute deterioration up to this time in the department. Patient has demonstrated clinical improvement and is stable, feels better at this time according to emergency department team. Agree with goals/plan of emergency department care as described in this physician's electronic medical record, including diagnostics, therapeutics and consultation as clinically warranted. Will discharge home with close outpatient follow up with primary care physician/provider and specialist if necessary. The patient and/or family was educated on concerning signs and features to return to the emergency department, in layman terms, including but not limited to: nausea, vomiting, fever, chills, persistent/worsening symptoms or any concerns at all. No immediate life threatening issues present on history, clinical exam, or any diagnostic evaluation. The patient is a safe disposition home, has capacity and insight into their condition, is ambulatory in the Emergency Department with no further questions and will follow up with their doctor(s) this week. Diagnosis, prognosis, natural history and treatment was discussed with patient and/or family. The patient and/or family were given the opportunity to ask questions and have them answered in full. The patient and/or family are with capacity and insight into the situation, treatment, risks, benefits, alternative therapies, and understand that they can ask any further questions if needed. .

## 2021-12-04 NOTE — ED PROVIDER NOTE - NSFOLLOWUPINSTRUCTIONS_ED_ALL_ED_FT
You were seen in the ER due to concern for foreign body protruding from the vagina after your operation.     We obtained labwork and had gynecology team evaluate you which showed no emergent abnormalities or foreign objects or operative material in your vagina at this time. The gynecology team recommends you follow-up with them as regularly scheduled.    You should also follow-up with your cardiologist as soon as possible to discuss your low heart rate as it went as low as the 30s here as we discussed, but you otherwise had no symptoms and your blood pressure was stable.    Return to the ER for any new or worsening fevers/chills, chest pain, difficulty breathing, abdominal pain, vomiting/inability to eat, inability to urinate, severe bleeding, or any other new or concerning symptoms.

## 2021-12-04 NOTE — ED ADULT NURSE NOTE - OBJECTIVE STATEMENT
40 y/o female PMH breast augmentation, c/s and urinary incontinence s/p mesh surgery presents to ED reporting abdominal pain. Pt reports this morning feeling "something" coming out of vagina, reports calling urogynecologist who instructed pt to remove it but pt is unsure as to what it is. Pt reports actively menstruating and experiencing lower abdominal pain. On exam, AOx3, speaking in complete sentences. Unlabored, spontaneous respirations, NAD. Abdomen soft, non-distended. Pt denies CP, SOB, n/v/d, fever/chills at this time. Heplock placed. Awaiting evaluation by MD.

## 2021-12-04 NOTE — ED PROVIDER NOTE - NS ED ROS FT
Constitutional: no fever and no chills  Eyes: no discharge, no pain, no visual changes  ENMT: no ear pain, no dysphagia or throat pain  Neck: no pain, no stiffness  CV: no chest pain, no palpitations, no edema  Resp: no cough, no shortness of breath  Abd: no abdominal pain, no nausea or vomiting, no diarrhea  : (+) object protruding from vagina, no dysuria, no hematuria  MSK: no back pain, no neck pain, no joint pain  Neuro: no LOC, no gait abnormality, no headache, no sensory deficits, no weakness  Skin: no rashes, no lacerations

## 2021-12-04 NOTE — CONSULT NOTE ADULT - SUBJECTIVE AND OBJECTIVE BOX
DAVID JAMISON  41y  Female [id number]    HPI: Pt is a 40yo LMP 11/29 POD#2 from MUS & cysto on 12/2 presenting to the ED w/c/f a "bulge" in her vagina. Pt states shes was showering this morning when she felt a soft bulge inside her vagina (on the anterior wall of the vagina based on pt's personal localization on physical exam). Pt became worried & when told to try to pull the bulge, pt c/o pain. Pt states when she laid down she felt as if the bulge disappeared. Pt denies fevers, chills, lower back pain, abdominal pain, vaginal discharge, dysuria, (c/o light vaginal spotting 2/2 menstrual cycle).     Name of UROGYN Physician: Dr. Glass  OBHx: C/Sx1  GynHx: Denies fibroids, cysts, endometriosis, STI's, Abnormal pap smears   PMH: hypothyroidism, HTN, asthma, atrophic kidney, COVID 2020  PSH: C/Sx1, breast augmentation, MUS  Meds: levothyroxine, losartan  Allx: NKDA  Social History: h/o tobacco & alcohol use    Vital Signs Last 24 Hrs  T(C): 36.7 (04 Dec 2021 13:55), Max: 36.8 (04 Dec 2021 12:38)  T(F): 98 (04 Dec 2021 13:55), Max: 98.2 (04 Dec 2021 12:38)  HR: 99 (04 Dec 2021 13:55) (53 - 99)  BP: 127/63 (04 Dec 2021 13:55) (108/68 - 127/63)  BP(mean): --  RR: 18 (04 Dec 2021 13:55) (18 - 18)  SpO2: 98% (04 Dec 2021 13:55) (98% - 98%)    Physical Exam:   General: sitting comfortably in bed, NAD   HEENT: neck supple, full ROM  Lungs: nonlabored breathing on RA  Back: No CVA tenderness  Abd: Soft, non-tender, non-distended.   :  Light bleeding on pad.    External labia wnl. Soft small bulge palpated on anterior wall of vagina, upon visualization location consistent with location of repair of incision made for MUS, sutures visualized, no mesh visualized eroding through vagina wall. No abnormal discharge noted within vaginal canal, no foreign body noted on speculum exam.

## 2021-12-04 NOTE — ED PROVIDER NOTE - PATIENT PORTAL LINK FT
You can access the FollowMyHealth Patient Portal offered by [facility] by registering at the following website: [email or web address]. By joining [facility]’s FollowMyHealth portal, you will also be able to view your health information using other applications (apps) compatible with our system.

## 2021-12-04 NOTE — CHART NOTE - NSCHARTNOTEFT_GEN_A_CORE
Nothing in the vagina. Discussed that sutures in the vagina can feel as if it is material. PT reassured. Discussed nothing in the vagina for 6 weeks. In regards to patient bradycardia, patient has a hx of bradycardia and will follow up with cardiology as discussed by ER. Pt being discharged. All questions answered. Pt to follow up with Dr. Glass as scheduled.    Discussed with Dr. Ni.   Blas Ramos MD   Urogynecology Fellow, PGY6

## 2021-12-04 NOTE — CHART NOTE - NSCHARTNOTEFT_GEN_A_CORE
Pt seen. Nothing in the vagina. Discussed that sutures in the vagina can feel as if it is material. PT reassured. Discussed nothing in the vagina for 6 weeks. In regards to patient bradycardia, patient has a hx of bradycardia and will follow up with cardiology as discussed by ER. Pt being discharged. All questions answered. Pt to follow up with Dr. Glass as scheduled.

## 2021-12-04 NOTE — ED PROVIDER NOTE - OBJECTIVE STATEMENT
40 y/o F PMH stress incontinence s/p midurethral sling 2 days ago with Dr. Glass presenting due to foreign object protruding from vagina today. Pt reports she noticed while cleaning herself in the shower that she felt a small hard object from the vagina, she tried to pull on it lightly but this was painful so stopped. She has no pain at rest. She spoke to her gynecologist who recommended coming to the ED for evaluation. Pt notes she has menses currently. She otherwise denies any fevers/chills, n/v, dysuria, lightheadedness, or weakness.

## 2021-12-04 NOTE — ED PROVIDER NOTE - PHYSICAL EXAMINATION
PHYSICAL EXAM:  GENERAL: Sitting comfortable in bed, in no acute distress  HENMT: Atraumatic, moist mucous membranes  EYES: Clear bilaterally, PERRL, EOMs intact b/l  HEART: RRR, S1/S2, no murmur/gallops/rubs  RESPIRATORY: Clear to auscultation bilaterally, no wheezes/rhonchi/rales  ABDOMEN: +BS, soft, nontender, nondistended  EXTREMITIES: No lower extremity edema, +2 radial pulses b/l  NEURO:  A&Ox4, no focal motor deficits or sensory deficits   Heme/LYMPH: No ecchymosis or bruising, no anterior/posterior cervical or supraclavicular LAD  SKIN:  Skin warm, dry and intact. No evidence of rash. PHYSICAL EXAM:  GENERAL: Sitting comfortable in bed, in no acute distress  HENMT: Atraumatic, moist mucous membranes  EYES: Clear bilaterally, PERRL, EOMs intact b/l  HEART: RRR, S1/S2, no murmurs  RESPIRATORY: Clear to auscultation bilaterally, no wheezes/rhonchi/rales  ABDOMEN: Mild suprapubic tenderness to palpation, +BS, soft, nondistended  : (Chaperone tech Marcella) Nl external genitalia, no bleeding or discharge, no foreign body noted externally or on speculum exam, no active bleeding or lesions  EXTREMITIES: No lower extremity edema, +2 radial pulses b/l  NEURO:  A&Ox4, no focal motor deficits or sensory deficits   SKIN:  Skin warm, dry and intact. No evidence of rash.

## 2021-12-04 NOTE — ED PROVIDER NOTE - PROGRESS NOTE DETAILS
Attending Laura: received sign out pending Gyn recs. In brief, 40 y/o F w/ recent mid urethral sling presented w/ concern for post op complication. Attending Laura: received sign out pending Gyn recs. In brief, 42 y/o F w/ recent mid urethral sling presented w/ concern for post op complication. Pt bradycardic here, however reporting known hx of bradycardia. Per review of recent procedure, PACU discharge note w/ HR of 45. Cezar EM/IM PGY4: Pt reassessed, complaining of mild HA from not eating. Tylenol ordered. Per gyn okay to dc with outpt f/u. Labwork with no emergent findings. Pt states she will call to make appointment to f/u with her cardiologist (last seen 2 years ago) for repeat evaluation and follow-up for bradycardia. Strict return precautions given. PT verbalized understanding of and agrees with these results and plan.

## 2021-12-04 NOTE — ED PROVIDER NOTE - CARE PLAN
1 Principal Discharge DX:	Encounter for medical screening examination   Principal Discharge DX:	Encounter for medical screening examination  Goal:	foreign body sensation, vagina, initial encounter

## 2021-12-04 NOTE — ED PROVIDER NOTE - CLINICAL SUMMARY MEDICAL DECISION MAKING FREE TEXT BOX
42 y/o F PMH stress incontinence s/p midurethral sling 2 days ago with Dr. Glass presenting due to foreign object protruding from vagina today. Afebrile, bradycardic (although pt notes HR is typically ~45), BP stable, no abd pain currently. No foreign object seen on speculum exam. Will c/s gynecology for further evaluation and reassess.

## 2021-12-05 ENCOUNTER — NON-APPOINTMENT (OUTPATIENT)
Age: 41
End: 2021-12-05

## 2021-12-05 LAB
CULTURE RESULTS: SIGNIFICANT CHANGE UP
SPECIMEN SOURCE: SIGNIFICANT CHANGE UP

## 2021-12-06 NOTE — ED ADULT TRIAGE NOTE - MEANS OF ARRIVAL
[FreeTextEntry1] : 78 yo with high risk prostate cancer and s/p xrt and 6 months ADT\par \par - PSA slight rising - 0.19 - 0.22 in 1 year\par - PSA in 6 months\par - f/u in 6 months\par - continue Myrbetriq and Flomax, medication renewed\par 
ambulatory

## 2021-12-22 ENCOUNTER — APPOINTMENT (OUTPATIENT)
Dept: UROGYNECOLOGY | Facility: CLINIC | Age: 41
End: 2021-12-22
Payer: COMMERCIAL

## 2021-12-22 ENCOUNTER — NON-APPOINTMENT (OUTPATIENT)
Age: 41
End: 2021-12-22

## 2021-12-22 VITALS
DIASTOLIC BLOOD PRESSURE: 76 MMHG | OXYGEN SATURATION: 99 % | SYSTOLIC BLOOD PRESSURE: 108 MMHG | HEIGHT: 67 IN | WEIGHT: 165 LBS | HEART RATE: 64 BPM | TEMPERATURE: 96.4 F | BODY MASS INDEX: 25.9 KG/M2

## 2021-12-22 DIAGNOSIS — N89.8 OTHER SPECIFIED NONINFLAMMATORY DISORDERS OF VAGINA: ICD-10-CM

## 2021-12-22 PROCEDURE — 99024 POSTOP FOLLOW-UP VISIT: CPT

## 2021-12-27 DIAGNOSIS — N76.0 ACUTE VAGINITIS: ICD-10-CM

## 2021-12-27 DIAGNOSIS — B96.89 ACUTE VAGINITIS: ICD-10-CM

## 2021-12-27 LAB
CANDIDA VAG CYTO: NOT DETECTED
G VAGINALIS+PREV SP MTYP VAG QL MICRO: DETECTED
T VAGINALIS VAG QL WET PREP: NOT DETECTED

## 2021-12-27 RX ORDER — METRONIDAZOLE 500 MG/1
500 TABLET ORAL TWICE DAILY
Qty: 14 | Refills: 0 | Status: ACTIVE | COMMUNITY
Start: 2021-12-27 | End: 1900-01-01

## 2021-12-28 ENCOUNTER — APPOINTMENT (OUTPATIENT)
Dept: UROGYNECOLOGY | Facility: CLINIC | Age: 41
End: 2021-12-28

## 2021-12-29 NOTE — SUBJECTIVE
[FreeTextEntry1] : Reports green foul smelling vaginal discharge for the last 5 days.  [FreeTextEntry8] : She denies use of medications.  [FreeTextEntry7] : She denies pain.  [FreeTextEntry6] : Good appetite.  [FreeTextEntry5] : She denies urinary incontinence, dysuria, urge incontinence, urinary frequency, urinary urgency, foul smelling urine, cloudy urine.  [FreeTextEntry3] : Ambulating without issue.  [FreeTextEntry2] : No issues with sleeping.

## 2021-12-29 NOTE — DISCUSSION/SUMMARY
[FreeTextEntry1] : Stella is a 42 yo s/p retropubic MUS, cystoscopy on 12/2/2021 with complaints of green vaginal discharge. No foul smelling or abnormal discharge noted on physical examination today, an Affirm test was obtained. Pt was reassured in regards that there was no foreign body in the vagina and that the feeling at the end of the vagina was the suture. Discussed that the suture will dissolve over time and that excess discharge can be present due to the suture. All questions answered. Pt to return in 4 weeks or sooner. Pt instructed to call office if any further questions/concerns arise.

## 2021-12-29 NOTE — OBJECTIVE
[Soft and Nontender] : soft and nontender [Clean, Dry, Intact] : Clean, Dry, Intact [Good Support] : Good support [No Masses or Tenderness] : no masses or tenderness [Post Void Residual ____ ml] : Post Void Residual was [unfilled] ml [FreeTextEntry3] : (-) full bladder CST, no mesh tenderness/ no mesh exposure. Vaginal with white vaginal discharge, normal appearing, no clumpy white vaginal discharge, no green discharge noted, no foul smells, no foreign body

## 2022-01-28 ENCOUNTER — APPOINTMENT (OUTPATIENT)
Dept: UROGYNECOLOGY | Facility: CLINIC | Age: 42
End: 2022-01-28
Payer: COMMERCIAL

## 2022-01-28 DIAGNOSIS — Z98.890 OTHER SPECIFIED POSTPROCEDURAL STATES: ICD-10-CM

## 2022-01-28 DIAGNOSIS — N39.41 URGE INCONTINENCE: ICD-10-CM

## 2022-01-28 DIAGNOSIS — N32.81 OVERACTIVE BLADDER: ICD-10-CM

## 2022-01-28 DIAGNOSIS — N39.3 STRESS INCONTINENCE (FEMALE) (MALE): ICD-10-CM

## 2022-01-28 PROCEDURE — 99024 POSTOP FOLLOW-UP VISIT: CPT

## 2022-01-28 NOTE — OBJECTIVE
[Soft and Nontender] : soft and nontender [Clean, Dry, Intact] : Clean, Dry, Intact [Good Support] : Good support [Healing well] : healing well [No Masses or Tenderness] : no masses or tenderness [FreeTextEntry3] : No mesh exposure.  Sutures have resorbed. Incision well healed. Vaginal tissue at incision site visible normal, no masses palpated. I asked her to point to area of concern and at first she pointed to her hymenal ring posteriorly. I counseled her that this was not a site of her surgery. She then pointed to her urethra, posterior to meatus. Tissue normal appearing, no protrusion visualized. No pain or tenderness with palpation. No sign of infection. no sutures present. No mesh exposure.

## 2022-01-28 NOTE — SUBJECTIVE
[FreeTextEntry1] : Reports improvement in her CARLTON since surgery  however has not exercised and is unsure if the surgery is working for her. She reports 1-2 episodes of incontinence since her surgery. She reports overall improvement in her urinary symptoms but is bothered that she has had leakage and concerned it will be worse once she exercises.  She reports feeling a tissue protrusion under the urethra at incision site. This has improved since her surgery but is concerning to her.  [FreeTextEntry8] : none [FreeTextEntry7] : denies any pain or discomfort. [FreeTextEntry6] : good [FreeTextEntry5] : Reports leakage with running water and urgency. She has a h/o UUI.   She denies incomplete bladder emptying. Reports overall improvement in her urinary symptoms since the surgery however leakage not resolved (unsure if due to stress or urgency).  [FreeTextEntry4] : moving without problems [FreeTextEntry3] : normal and steady [FreeTextEntry2] : good

## 2022-01-28 NOTE — DISCUSSION/SUMMARY
[Post-Op instructions given. Pt/family verbalizes understanding] : post-operative instructions were provided to the patient/family who verbalize understanding [Risks/Benefits discussed. Pt/family verbalizes understanding] : risks and benefits of the procedure were discussed with the patient/family who verbalize understanding [FreeTextEntry1] : Discussion by Dr Glass: \par - I have examined Stella and counseled her. We reviewed exam findings extensively. She has a normal exam by both myself and Corie Campos. There is no mesh exposure or tissue protrusion visualized. She was counseled that she may be feeling her incision line and scarring from surgery, however site grossly normal and well healed on exam. Will continue to monitor. \par -We reviewed her leakage and expectations from surgery. She has a h/o UUI, which she was counseled is not corrected by midurethral sling. If this is bothersome, we offered her OAB treatment. She declines at this time. Will continue to follow. Regarding possible CARLTON. We discussed that if symptoms are improved, but not resolved with surgery, we can proceed with further treatment. I recommend pelvic floor PT and discussed rationale for treatment. She declines. We discussed performing PFEs on own, instructions provided to her. She declined. I recommend she f/u in 6 weeks for repeat examination and to address any leakage she may have once she is 12 weeks postop. She will RTO in 6 weeks, or sooner if issues arise. I offered her support and reassurance that our goal is for her to be satisfied with treatment results and will support her to that end. \par \par \par

## 2022-03-09 ENCOUNTER — APPOINTMENT (OUTPATIENT)
Dept: UROGYNECOLOGY | Facility: CLINIC | Age: 42
End: 2022-03-09

## 2022-03-18 NOTE — ED PROVIDER NOTE - CROS ED NEURO POS
3/17/2022: SS Note/Discharge plan:  SS Consult for post-op d/c planning, d/c plan confirmed for pt to return home from [address] with help from her  who will transport her home, pt has or borrowed dme needed, [facility] and confirmed they have accepted pt for home therapy for start of care tomorrow 3/18. Electronically signed by KYREE Arzate on 3/17/2022 at 2:31 PM
3/18/2022: SS Note/Discharge plan:  SS Consult noted for a w/w, notified [facility] of pt stayed overnight observation and being d/c today, will reschedule home therapy visit for the weekend, per nursing pt informed and confirmed that she has a w/w for home use. Electronically signed by KYREE Kebede on 3/18/2022 at 9:41 AM
[facility]

## 2022-04-28 ENCOUNTER — INPATIENT (INPATIENT)
Facility: HOSPITAL | Age: 42
LOS: 4 days | Discharge: HOME | DRG: 607 | End: 2022-05-03
Attending: HOSPITALIST | Admitting: HOSPITALIST
Payer: COMMERCIAL

## 2022-04-28 VITALS
DIASTOLIC BLOOD PRESSURE: 90 MMHG | HEART RATE: 73 BPM | RESPIRATION RATE: 18 BRPM | HEIGHT: 67 IN | TEMPERATURE: 98 F | OXYGEN SATURATION: 98 % | WEIGHT: 149.91 LBS | SYSTOLIC BLOOD PRESSURE: 138 MMHG

## 2022-04-28 DIAGNOSIS — Z98.89 OTHER SPECIFIED POSTPROCEDURAL STATES: Chronic | ICD-10-CM

## 2022-04-28 DIAGNOSIS — Z98.891 HISTORY OF UTERINE SCAR FROM PREVIOUS SURGERY: Chronic | ICD-10-CM

## 2022-04-28 DIAGNOSIS — Z98.82 BREAST IMPLANT STATUS: Chronic | ICD-10-CM

## 2022-04-28 DIAGNOSIS — B02.7 DISSEMINATED ZOSTER: ICD-10-CM

## 2022-04-28 LAB
ALBUMIN SERPL ELPH-MCNC: 4.3 G/DL — SIGNIFICANT CHANGE UP (ref 3.3–5)
ALP SERPL-CCNC: 55 U/L — SIGNIFICANT CHANGE UP (ref 40–120)
ALT FLD-CCNC: 10 U/L — SIGNIFICANT CHANGE UP (ref 10–45)
ANION GAP SERPL CALC-SCNC: 12 MMOL/L — SIGNIFICANT CHANGE UP (ref 5–17)
APPEARANCE CSF: CLEAR — SIGNIFICANT CHANGE UP
APTT BLD: 32 SEC — SIGNIFICANT CHANGE UP (ref 27.5–35.5)
AST SERPL-CCNC: 13 U/L — SIGNIFICANT CHANGE UP (ref 10–40)
BASOPHILS # BLD AUTO: 0.05 K/UL — SIGNIFICANT CHANGE UP (ref 0–0.2)
BASOPHILS NFR BLD AUTO: 0.6 % — SIGNIFICANT CHANGE UP (ref 0–2)
BILIRUB SERPL-MCNC: 0.2 MG/DL — SIGNIFICANT CHANGE UP (ref 0.2–1.2)
BUN SERPL-MCNC: 16 MG/DL — SIGNIFICANT CHANGE UP (ref 7–23)
CALCIUM SERPL-MCNC: 9.1 MG/DL — SIGNIFICANT CHANGE UP (ref 8.4–10.5)
CHLORIDE SERPL-SCNC: 105 MMOL/L — SIGNIFICANT CHANGE UP (ref 96–108)
CO2 SERPL-SCNC: 23 MMOL/L — SIGNIFICANT CHANGE UP (ref 22–31)
COLOR CSF: SIGNIFICANT CHANGE UP
CREAT SERPL-MCNC: 0.8 MG/DL — SIGNIFICANT CHANGE UP (ref 0.5–1.3)
EGFR: 94 ML/MIN/1.73M2 — SIGNIFICANT CHANGE UP
EOSINOPHIL # BLD AUTO: 0.42 K/UL — SIGNIFICANT CHANGE UP (ref 0–0.5)
EOSINOPHIL NFR BLD AUTO: 5 % — SIGNIFICANT CHANGE UP (ref 0–6)
FLUAV AG NPH QL: SIGNIFICANT CHANGE UP
FLUBV AG NPH QL: SIGNIFICANT CHANGE UP
GLUCOSE CSF-MCNC: 66 MG/DL — SIGNIFICANT CHANGE UP (ref 40–70)
GLUCOSE SERPL-MCNC: 97 MG/DL — SIGNIFICANT CHANGE UP (ref 70–99)
HCG SERPL-ACNC: <2 MIU/ML — SIGNIFICANT CHANGE UP
HCT VFR BLD CALC: 38.7 % — SIGNIFICANT CHANGE UP (ref 34.5–45)
HGB BLD-MCNC: 12.7 G/DL — SIGNIFICANT CHANGE UP (ref 11.5–15.5)
IMM GRANULOCYTES NFR BLD AUTO: 0.2 % — SIGNIFICANT CHANGE UP (ref 0–1.5)
INR BLD: 1 RATIO — SIGNIFICANT CHANGE UP (ref 0.88–1.16)
LYMPHOCYTES # BLD AUTO: 3.91 K/UL — HIGH (ref 1–3.3)
LYMPHOCYTES # BLD AUTO: 46.7 % — HIGH (ref 13–44)
MCHC RBC-ENTMCNC: 32.5 PG — SIGNIFICANT CHANGE UP (ref 27–34)
MCHC RBC-ENTMCNC: 32.8 GM/DL — SIGNIFICANT CHANGE UP (ref 32–36)
MCV RBC AUTO: 99 FL — SIGNIFICANT CHANGE UP (ref 80–100)
MONOCYTES # BLD AUTO: 0.55 K/UL — SIGNIFICANT CHANGE UP (ref 0–0.9)
MONOCYTES NFR BLD AUTO: 6.6 % — SIGNIFICANT CHANGE UP (ref 2–14)
NEUTROPHILS # BLD AUTO: 3.42 K/UL — SIGNIFICANT CHANGE UP (ref 1.8–7.4)
NEUTROPHILS NFR BLD AUTO: 40.9 % — LOW (ref 43–77)
NRBC # BLD: 0 /100 WBCS — SIGNIFICANT CHANGE UP (ref 0–0)
PLATELET # BLD AUTO: 322 K/UL — SIGNIFICANT CHANGE UP (ref 150–400)
POTASSIUM SERPL-MCNC: 4.1 MMOL/L — SIGNIFICANT CHANGE UP (ref 3.5–5.3)
POTASSIUM SERPL-SCNC: 4.1 MMOL/L — SIGNIFICANT CHANGE UP (ref 3.5–5.3)
PROT CSF-MCNC: 20 MG/DL — SIGNIFICANT CHANGE UP (ref 15–45)
PROT SERPL-MCNC: 7.1 G/DL — SIGNIFICANT CHANGE UP (ref 6–8.3)
PROTHROM AB SERPL-ACNC: 11.5 SEC — SIGNIFICANT CHANGE UP (ref 10.5–13.4)
RBC # BLD: 3.91 M/UL — SIGNIFICANT CHANGE UP (ref 3.8–5.2)
RBC # FLD: 13.3 % — SIGNIFICANT CHANGE UP (ref 10.3–14.5)
RSV RNA NPH QL NAA+NON-PROBE: SIGNIFICANT CHANGE UP
SARS-COV-2 RNA SPEC QL NAA+PROBE: SIGNIFICANT CHANGE UP
SODIUM SERPL-SCNC: 140 MMOL/L — SIGNIFICANT CHANGE UP (ref 135–145)
TUBE TYPE: SIGNIFICANT CHANGE UP
WBC # BLD: 8.37 K/UL — SIGNIFICANT CHANGE UP (ref 3.8–10.5)
WBC # FLD AUTO: 8.37 K/UL — SIGNIFICANT CHANGE UP (ref 3.8–10.5)

## 2022-04-28 PROCEDURE — 99285 EMERGENCY DEPT VISIT HI MDM: CPT

## 2022-04-28 RX ORDER — ACYCLOVIR SODIUM 500 MG
700 VIAL (EA) INTRAVENOUS EVERY 8 HOURS
Refills: 0 | Status: DISCONTINUED | OUTPATIENT
Start: 2022-04-29 | End: 2022-04-29

## 2022-04-28 RX ORDER — ACYCLOVIR SODIUM 500 MG
700 VIAL (EA) INTRAVENOUS ONCE
Refills: 0 | Status: COMPLETED | OUTPATIENT
Start: 2022-04-28 | End: 2022-04-29

## 2022-04-28 RX ORDER — ACETAMINOPHEN 500 MG
1000 TABLET ORAL ONCE
Refills: 0 | Status: COMPLETED | OUTPATIENT
Start: 2022-04-28 | End: 2022-04-28

## 2022-04-28 RX ORDER — DIAZEPAM 5 MG
5 TABLET ORAL ONCE
Refills: 0 | Status: DISCONTINUED | OUTPATIENT
Start: 2022-04-28 | End: 2022-04-28

## 2022-04-28 RX ORDER — KETOROLAC TROMETHAMINE 30 MG/ML
15 SYRINGE (ML) INJECTION ONCE
Refills: 0 | Status: DISCONTINUED | OUTPATIENT
Start: 2022-04-28 | End: 2022-04-28

## 2022-04-28 RX ORDER — SODIUM CHLORIDE 9 MG/ML
1000 INJECTION INTRAMUSCULAR; INTRAVENOUS; SUBCUTANEOUS ONCE
Refills: 0 | Status: COMPLETED | OUTPATIENT
Start: 2022-04-28 | End: 2022-04-28

## 2022-04-28 RX ORDER — ACYCLOVIR SODIUM 500 MG
VIAL (EA) INTRAVENOUS
Refills: 0 | Status: DISCONTINUED | OUTPATIENT
Start: 2022-04-29 | End: 2022-04-29

## 2022-04-28 RX ADMIN — Medication 15 MILLIGRAM(S): at 21:00

## 2022-04-28 RX ADMIN — Medication 5 MILLIGRAM(S): at 23:11

## 2022-04-28 RX ADMIN — Medication 400 MILLIGRAM(S): at 20:53

## 2022-04-28 RX ADMIN — Medication 1000 MILLIGRAM(S): at 21:00

## 2022-04-28 RX ADMIN — Medication 15 MILLIGRAM(S): at 20:53

## 2022-04-28 NOTE — ED PROVIDER NOTE - NS ED ATTENDING STATEMENT MOD
This was a shared visit with the RUBÉN. I reviewed and verified the documentation and independently performed the documented:

## 2022-04-28 NOTE — ED ADULT NURSE NOTE - OBJECTIVE STATEMENT
40y/o F coming to the ED c/o rash. 40y/o F coming to the ED c/o rash. Pt states that shes been having a painful rash x3week. Rash is noted on the B/L sides of her neck, started on 1g valacyclovir w/o relief. Pt states the rash then started to breast under her B/L breast & L inguinal region. Pt denies any CP/SOB/N/V/D. Pt states had mild fever today. On exam, pt is breathing spontaneously, able to speak full sentences w/o difficulty, saturating 98% RA. Abdomen soft, nontender, nondistended. IV placed, labs collected and sent. Pt given call bell and educated on how to use.

## 2022-04-28 NOTE — ED PROVIDER NOTE - NS ED ROS FT
Constitutional: see hpi  Eyes: No visual changes  CV: No chest pain   Resp: No SOB   GI: No abd pain. No nausea or vomiting.   MSK: No musculoskeletal pain  Skin: see hpi  Neuro: No numbness or tingling. No weakness.

## 2022-04-28 NOTE — ED PROVIDER NOTE - PHYSICAL EXAMINATION
A&Ox3  NCAT. PERRL, EOMI. unable to range neck 2/2 pain  Lungs CTAB. No w/r/r  Cardiac +S1S2, RRR, No m/r/g.   Abd soft, NT/ND, +BS, no rebound or guarding.   Extremities: cap refill <2, pulses in distal extremities 4+, no edema.   Skin: + dry macular vasicular rash to BL lower neck, inframammary folds and L. groin region. No drainage or discharge no warmth.   No focal Defecits

## 2022-04-28 NOTE — ED PROVIDER NOTE - OBJECTIVE STATEMENT
42 yo female with pmh hypothyroidism here with c/f painful evolving rash x 3 weeks. Pt states she developing a painful red rash to the back of her neck approx 3 weeks ago and started on acyclovir 1g TID however rash spread to the right side of her neck then her left side. She saw her pcp last week who extended her acyclovir however this past week she started to develop a similar rash under BL breasts and to the left inguinal region. Pt states pain is unbearable, has been taking motrin without any improvement, states neck is now becoming still, has mild headache. No numbness, tingling, weakness, no nausea, vomiting, no changes in vision. pt states she had low grade fever today however unsure of how high. 42 yo female with pmh hypothyroidism here with c/f painful evolving rash x 3 weeks. Pt states she developing a painful red rash to the back of her neck approx 3 weeks ago and started on valacyclovir 1g TID however rash spread to the right side of her neck then her left side. She saw her pcp last week who extended her valacyclovir however this past week she started to develop a similar rash under BL breasts and to the left inguinal region. Pt states pain is unbearable, has been taking motrin without any improvement, states neck is now becoming still, has mild headache. No numbness, tingling, weakness, no nausea, vomiting, no changes in vision. pt states she had low grade fever today however unsure of how high.

## 2022-04-28 NOTE — ED PROVIDER NOTE - ATTENDING APP SHARED VISIT CONTRIBUTION OF CARE
41F hx hypothyroidism here with rash, neck pain/stiffness. Rash first appeared ~3 weeks ago on R posterior neck, seen at urgent care and was dx with shingles, started on Valtrex TID x7 days. Rash then spread to BL neck, BL infra-mammary folds, skin on R upper abdomen adjacent to infra-mammary lesions and to L side of mons pubis. Saw PCP who Rx additional valtrex. No hx of STDs. No oral or genital lesions. Reports subjective fever and chills beginning today associated with increased neck pain and stiffness. PE uncomfortable appearing F NCAT PERRL EOMI no oral or facial lesions. +Neck stiffness, Dried vesicular lesions w hyperkeratotic skin to R posterior and lateral neck, L anterior and lateral neck. RRR, lungs CTA BL, abd soft ntnd, Vesiculopapular lesions to BL infra-mammary folds, R upper abdomen, L mons pubis area. Concern for disseminated zoster, probable viral meningitis w neck stiffness and fevers. Labs, LP, meds, TBA.

## 2022-04-29 ENCOUNTER — TRANSCRIPTION ENCOUNTER (OUTPATIENT)
Age: 42
End: 2022-04-29

## 2022-04-29 DIAGNOSIS — R21 RASH AND OTHER NONSPECIFIC SKIN ERUPTION: ICD-10-CM

## 2022-04-29 DIAGNOSIS — Z29.9 ENCOUNTER FOR PROPHYLACTIC MEASURES, UNSPECIFIED: ICD-10-CM

## 2022-04-29 DIAGNOSIS — B02.1 ZOSTER MENINGITIS: ICD-10-CM

## 2022-04-29 DIAGNOSIS — B01.0 VARICELLA MENINGITIS: ICD-10-CM

## 2022-04-29 DIAGNOSIS — E03.9 HYPOTHYROIDISM, UNSPECIFIED: ICD-10-CM

## 2022-04-29 DIAGNOSIS — G03.9 MENINGITIS, UNSPECIFIED: ICD-10-CM

## 2022-04-29 DIAGNOSIS — B02.7 DISSEMINATED ZOSTER: ICD-10-CM

## 2022-04-29 LAB
A1C WITH ESTIMATED AVERAGE GLUCOSE RESULT: 5.8 % — HIGH (ref 4–5.6)
ANION GAP SERPL CALC-SCNC: 11 MMOL/L — SIGNIFICANT CHANGE UP (ref 5–17)
BUN SERPL-MCNC: 15 MG/DL — SIGNIFICANT CHANGE UP (ref 7–23)
CALCIUM SERPL-MCNC: 8.4 MG/DL — SIGNIFICANT CHANGE UP (ref 8.4–10.5)
CHLORIDE SERPL-SCNC: 107 MMOL/L — SIGNIFICANT CHANGE UP (ref 96–108)
CHOLEST SERPL-MCNC: 152 MG/DL — SIGNIFICANT CHANGE UP
CO2 SERPL-SCNC: 23 MMOL/L — SIGNIFICANT CHANGE UP (ref 22–31)
CREAT SERPL-MCNC: 0.75 MG/DL — SIGNIFICANT CHANGE UP (ref 0.5–1.3)
CSF PCR RESULT: SIGNIFICANT CHANGE UP
EGFR: 102 ML/MIN/1.73M2 — SIGNIFICANT CHANGE UP
ESTIMATED AVERAGE GLUCOSE: 120 MG/DL — HIGH (ref 68–114)
GLUCOSE SERPL-MCNC: 111 MG/DL — HIGH (ref 70–99)
GRAM STN FLD: SIGNIFICANT CHANGE UP
HCT VFR BLD CALC: 35.5 % — SIGNIFICANT CHANGE UP (ref 34.5–45)
HDLC SERPL-MCNC: 55 MG/DL — SIGNIFICANT CHANGE UP
HGB BLD-MCNC: 11.6 G/DL — SIGNIFICANT CHANGE UP (ref 11.5–15.5)
HIV 1+2 AB+HIV1 P24 AG SERPL QL IA: SIGNIFICANT CHANGE UP
LDH CSF L TO P-CCNC: <15 U/L — SIGNIFICANT CHANGE UP
LDH FLD-CCNC: <15 U/L — SIGNIFICANT CHANGE UP
LIPID PNL WITH DIRECT LDL SERPL: 80 MG/DL — SIGNIFICANT CHANGE UP
MCHC RBC-ENTMCNC: 32 PG — SIGNIFICANT CHANGE UP (ref 27–34)
MCHC RBC-ENTMCNC: 32.7 GM/DL — SIGNIFICANT CHANGE UP (ref 32–36)
MCV RBC AUTO: 98.1 FL — SIGNIFICANT CHANGE UP (ref 80–100)
NEUTROPHILS # CSF: SIGNIFICANT CHANGE UP (ref 0–6)
NON HDL CHOLESTEROL: 97 MG/DL — SIGNIFICANT CHANGE UP
NRBC # BLD: 0 /100 WBCS — SIGNIFICANT CHANGE UP (ref 0–0)
NRBC NFR CSF: <1 — SIGNIFICANT CHANGE UP (ref 0–5)
PLATELET # BLD AUTO: 265 K/UL — SIGNIFICANT CHANGE UP (ref 150–400)
POTASSIUM SERPL-MCNC: 3.7 MMOL/L — SIGNIFICANT CHANGE UP (ref 3.5–5.3)
POTASSIUM SERPL-SCNC: 3.7 MMOL/L — SIGNIFICANT CHANGE UP (ref 3.5–5.3)
RBC # BLD: 3.62 M/UL — LOW (ref 3.8–5.2)
RBC # CSF: 106 /UL — HIGH (ref 0–0)
RBC # FLD: 13.2 % — SIGNIFICANT CHANGE UP (ref 10.3–14.5)
SODIUM SERPL-SCNC: 141 MMOL/L — SIGNIFICANT CHANGE UP (ref 135–145)
SPECIMEN SOURCE: SIGNIFICANT CHANGE UP
TRIGL SERPL-MCNC: 84 MG/DL — SIGNIFICANT CHANGE UP
VZV IGG FLD QL IA: 608.4 INDEX — SIGNIFICANT CHANGE UP
VZV IGG FLD QL IA: POSITIVE — SIGNIFICANT CHANGE UP
WBC # BLD: 6.55 K/UL — SIGNIFICANT CHANGE UP (ref 3.8–10.5)
WBC # FLD AUTO: 6.55 K/UL — SIGNIFICANT CHANGE UP (ref 3.8–10.5)

## 2022-04-29 PROCEDURE — 12345: CPT | Mod: NC,GC

## 2022-04-29 PROCEDURE — 99254 IP/OBS CNSLTJ NEW/EST MOD 60: CPT

## 2022-04-29 PROCEDURE — 99223 1ST HOSP IP/OBS HIGH 75: CPT | Mod: GC

## 2022-04-29 RX ORDER — KETOROLAC TROMETHAMINE 30 MG/ML
15 SYRINGE (ML) INJECTION EVERY 8 HOURS
Refills: 0 | Status: DISCONTINUED | OUTPATIENT
Start: 2022-04-29 | End: 2022-05-03

## 2022-04-29 RX ORDER — OXYCODONE HYDROCHLORIDE 5 MG/1
5 TABLET ORAL EVERY 4 HOURS
Refills: 0 | Status: DISCONTINUED | OUTPATIENT
Start: 2022-04-29 | End: 2022-05-03

## 2022-04-29 RX ORDER — LOSARTAN POTASSIUM 100 MG/1
1 TABLET, FILM COATED ORAL
Qty: 0 | Refills: 0 | DISCHARGE

## 2022-04-29 RX ORDER — CALAMINE AND ZINC OXIDE AND PHENOL 160; 10 MG/ML; MG/ML
1 LOTION TOPICAL
Refills: 0 | Status: DISCONTINUED | OUTPATIENT
Start: 2022-04-29 | End: 2022-05-03

## 2022-04-29 RX ORDER — ACETAMINOPHEN 500 MG
1000 TABLET ORAL ONCE
Refills: 0 | Status: DISCONTINUED | OUTPATIENT
Start: 2022-04-29 | End: 2022-04-29

## 2022-04-29 RX ORDER — LEVOTHYROXINE SODIUM 125 MCG
125 TABLET ORAL DAILY
Refills: 0 | Status: DISCONTINUED | OUTPATIENT
Start: 2022-04-29 | End: 2022-05-03

## 2022-04-29 RX ORDER — ACETAMINOPHEN 500 MG
650 TABLET ORAL EVERY 6 HOURS
Refills: 0 | Status: DISCONTINUED | OUTPATIENT
Start: 2022-04-29 | End: 2022-05-03

## 2022-04-29 RX ORDER — SODIUM CHLORIDE 9 MG/ML
1000 INJECTION, SOLUTION INTRAVENOUS
Refills: 0 | Status: DISCONTINUED | OUTPATIENT
Start: 2022-04-29 | End: 2022-04-29

## 2022-04-29 RX ORDER — CALAMINE AND ZINC OXIDE AND PHENOL 160; 10 MG/ML; MG/ML
1 LOTION TOPICAL ONCE
Refills: 0 | Status: DISCONTINUED | OUTPATIENT
Start: 2022-04-29 | End: 2022-04-29

## 2022-04-29 RX ORDER — ASCORBIC ACID 60 MG
1 TABLET,CHEWABLE ORAL
Qty: 0 | Refills: 0 | DISCHARGE

## 2022-04-29 RX ORDER — ONDANSETRON 8 MG/1
4 TABLET, FILM COATED ORAL ONCE
Refills: 0 | Status: COMPLETED | OUTPATIENT
Start: 2022-04-29 | End: 2022-04-29

## 2022-04-29 RX ORDER — SODIUM CHLORIDE 9 MG/ML
1000 INJECTION, SOLUTION INTRAVENOUS ONCE
Refills: 0 | Status: COMPLETED | OUTPATIENT
Start: 2022-04-29 | End: 2022-04-29

## 2022-04-29 RX ADMIN — Medication 264 MILLIGRAM(S): at 00:17

## 2022-04-29 RX ADMIN — CALAMINE AND ZINC OXIDE AND PHENOL 1 APPLICATION(S): 160; 10 LOTION TOPICAL at 23:46

## 2022-04-29 RX ADMIN — Medication 650 MILLIGRAM(S): at 09:56

## 2022-04-29 RX ADMIN — OXYCODONE HYDROCHLORIDE 5 MILLIGRAM(S): 5 TABLET ORAL at 04:37

## 2022-04-29 RX ADMIN — CALAMINE AND ZINC OXIDE AND PHENOL 1 APPLICATION(S): 160; 10 LOTION TOPICAL at 04:16

## 2022-04-29 RX ADMIN — Medication 1 TABLET(S): at 22:41

## 2022-04-29 RX ADMIN — OXYCODONE HYDROCHLORIDE 5 MILLIGRAM(S): 5 TABLET ORAL at 12:39

## 2022-04-29 RX ADMIN — SODIUM CHLORIDE 1000 MILLILITER(S): 9 INJECTION INTRAMUSCULAR; INTRAVENOUS; SUBCUTANEOUS at 00:17

## 2022-04-29 RX ADMIN — CALAMINE AND ZINC OXIDE AND PHENOL 1 APPLICATION(S): 160; 10 LOTION TOPICAL at 06:07

## 2022-04-29 RX ADMIN — ONDANSETRON 4 MILLIGRAM(S): 8 TABLET, FILM COATED ORAL at 17:30

## 2022-04-29 RX ADMIN — Medication 264 MILLIGRAM(S): at 06:05

## 2022-04-29 RX ADMIN — Medication 125 MICROGRAM(S): at 06:05

## 2022-04-29 RX ADMIN — Medication 1 TABLET(S): at 17:40

## 2022-04-29 RX ADMIN — OXYCODONE HYDROCHLORIDE 5 MILLIGRAM(S): 5 TABLET ORAL at 04:07

## 2022-04-29 RX ADMIN — SODIUM CHLORIDE 100 MILLILITER(S): 9 INJECTION, SOLUTION INTRAVENOUS at 11:30

## 2022-04-29 RX ADMIN — CALAMINE AND ZINC OXIDE AND PHENOL 1 APPLICATION(S): 160; 10 LOTION TOPICAL at 12:49

## 2022-04-29 RX ADMIN — SODIUM CHLORIDE 1000 MILLILITER(S): 9 INJECTION, SOLUTION INTRAVENOUS at 18:27

## 2022-04-29 RX ADMIN — SODIUM CHLORIDE 100 MILLILITER(S): 9 INJECTION, SOLUTION INTRAVENOUS at 04:07

## 2022-04-29 NOTE — H&P ADULT - NSHPPHYSICALEXAM_GEN_ALL_CORE
Vital Signs Last 24 Hrs  T(C): 36.4 (29 Apr 2022 00:24), Max: 36.8 (28 Apr 2022 20:35)  T(F): 97.5 (29 Apr 2022 00:24), Max: 98.3 (28 Apr 2022 20:35)  HR: 53 (29 Apr 2022 00:24) (53 - 73)  BP: 102/73 (29 Apr 2022 00:24) (102/73 - 138/90)  BP(mean): --  RR: 18 (29 Apr 2022 00:24) (18 - 18)  SpO2: 98% (29 Apr 2022 00:24) (98% - 98%)    Physical Exam:  Gen: Alert, mild distress  HEENT: NCAT, PERRL, EOMI, clear conjunctiva, no scleral icterus, no erythema or exudates in the oropharynx, no ulcers, mmm  CV: RRR, S1S2, no m/r/g  Resp: CTAB, normal respiratory effort  Abd: Soft, NT, ND  Ext: no edema, no clubbing or cyanosis  Neuro: Patient declines brudzinski maneuvers due to pain. Negative kernig's. AOx3, CN2-12 grossly intact, LAZCANO. Sensation intact in face bilateral. Hearing intact bilateral.  Skin: crusted rash on back of neck and R and L lateral sides of neck.  crusted lesion on the undersides of bilateral breasts, inguinal. Warm, perfused  Psych: normal affect

## 2022-04-29 NOTE — PROGRESS NOTE ADULT - ATTENDING COMMENTS
40 y/o F with hypothyroidism presenting with subacute linear healing vesicular rash with new onset neck stiffness c/f disseminated herpes zoster/meningitis.  #disseminated zoster   #aseptic meningitis   - s/p LP with relatively bland results  - f/u CSF VZV studies  - Continue IV acyclovir + IVF empirically  - ID consulted  - Pain improving with oxy + calamine lotion  Remainder as above

## 2022-04-29 NOTE — DISCHARGE NOTE PROVIDER - PROVIDER TOKENS
PROVIDER:[TOKEN:[[id number]],FOLLOWUP:[2 weeks],ESTABLISHEDPATIENT:[T]] PROVIDER:[TOKEN:[[id number]],FOLLOWUP:[1 week],ESTABLISHEDPATIENT:[T]]

## 2022-04-29 NOTE — PATIENT PROFILE ADULT - FALL HARM RISK - HARM RISK INTERVENTIONS

## 2022-04-29 NOTE — DISCHARGE NOTE PROVIDER - NSFOLLOWUPCLINICSTOKEN_GEN_ALL_ED_FT
585457:2 weeks|| ||00\01||False;737099:Routine|| ||00\01||False; 231822:2 weeks|| ||00\01||False;646276:Routine|| ||00\01||False;484276:2 weeks|| ||00\01||False;

## 2022-04-29 NOTE — H&P ADULT - NSHPREVIEWOFSYSTEMS_GEN_ALL_CORE
General: No fevers, chills, fatigue, weight loss  HEENT: No headaches, acute visual changes, rhinorrhea, sore throat, dysphagia  CVS: No chest pain, palpitations  Resp: No cough, dyspnea, wheezing  GI: No abdominal pain, nausea, vomiting, constipation, diarrhea, hematochezia, melena  : No dysuria, frequency, hematuria, or discharge  MSK: No joint pain or swelling  Ext: No edema, no claudication  Skin: No rashes or itching  Heme: No easy bruising or petechiae  Neuro: No confusion, numbness, focal weakness, or loss of consciousness  Endocrine: No excessive heat or cold symptoms, polyuria, polydipsia General: + fever/chill, fatigue, gen weakness  HEENT: + headaches. No acute visual changes, facial pain, eye pain  CVS: No chest pain, palpitations  Resp: No cough, dyspnea, wheezing  GI: No abdominal pain, nausea, vomiting  : No dysuria, frequency  MSK: No joint pain or swelling  Ext: No edema, no claudication  Skin: + rash, no itching  Heme: No easy bruising or petechiae  Neuro: + neck stiffness, pain.  No confusion, numbness, focal weakness, or loss of consciousness  Endocrine: No excessive heat or cold symptoms, polyuria, polydipsia

## 2022-04-29 NOTE — CONSULT NOTE ADULT - ASSESSMENT
Ms Rolon is 41 year old lady with PMH of hypothyroidism who presents with painful evolving rash x 3 weeks with new neck stiffness/pain. 3 weeks ago, developed red painful welts like rash on the back of the neck.  She went to urgent care and was prescribed valacyclovir PO 1g TID, however rash spread to the bilaterally to the right and left sides of neck and this week to bilateral breasts and left inguinal region.  She came to the ED today because she developed neck stiffness and pain in neck with movement, mild frontal headache and generalized weakness. No numbness, tingling, weakness, no nausea, vomiting, no changes in vision, hearing, no face or eye pain. She also notes fevers/chills at home.  No hx of autoimmune disease - RA, IBD, SLE. Developed hypothyroidism after pregnancy.  No hx of STI's, HIV.  No known exposure to persons with shingles, chickenpox.      WORKUP  CSF (4/28): CSF PCR negative , WBC <1, glucose 66 mg/dL, [id number] mg/dL     HIV-1/2 Combo Result: Nonreact (04-29    IMPRESSION  Rash with headache    RECOMMENDATIONS  Afebrile with no leukocytosis and negative CSF workup so far  Pt is currently on empiric acyclovir 10mg/kg (70mg) q8h for disseminated Zoster   VZV PCR and IgG/IgM in lab    PT TO BE SEEN. PRELIM NOTE  PENDING RECS. PLEASE WAIT FOR FINAL RECS AFTER DISCUSSION WITH ATTENDINGJong Arguelles MD, PGY4   ID fellow  [employer] Teams Preferred  After 5pm/weekends call [phone]   Ms Rolon is 41 year old lady with PMH of hypothyroidism who presents with painful evolving rash x 3 weeks with new neck stiffness/pain. 3 weeks ago, developed red painful welts like rash on the back of the neck.  She went to urgent care and was prescribed valacyclovir PO 1g TID, however rash spread to the bilaterally to the right and left sides of neck and this week to bilateral breasts and left inguinal region.  She came to the ED today because she developed neck stiffness and pain in neck with movement, mild frontal headache and generalized weakness. No numbness, tingling, weakness, no nausea, vomiting, no changes in vision, hearing, no face or eye pain. She also notes fevers/chills at home.  No hx of autoimmune disease - RA, IBD, SLE. Developed hypothyroidism after pregnancy.  No hx of STI's, HIV.  No known exposure to persons with shingles, chickenpox.      WORKUP  CSF (4/28): CSF PCR negative , WBC <1, glucose 66 mg/dL, [id number] mg/dL     HIV-1/2 Combo Result: Nonreact (04-29    IMPRESSION  BL Dermatomal Rash suspicio    RECOMMENDATIONS  Afebrile and negative CSF workup so far  No leukocytosis, however lymphocyte predominance  Pt is currently on empiric acyclovir 10mg/kg (70mg) q8h for disseminated Zoster   VZV PCR and IgG/IgM in lab    PT TO BE SEEN. PRELIM NOTE  PENDING RECS. PLEASE WAIT FOR FINAL RECS AFTER DISCUSSION WITH ATTENDINGJong Arguelles MD, PGY4   ID fellow  [employer] Teams Preferred  After 5pm/weekends call [phone]   Ms Rolon is 41 year old lady with PMH of hypothyroidism who presents with painful evolving rash x 3 weeks with new neck stiffness/pain. 3 weeks ago, developed red painful welts like rash on the back of the neck.  She went to urgent care and was prescribed valacyclovir PO 1g TID, however rash spread to the bilaterally to the right and left sides of neck and this week to bilateral breasts and left inguinal region.  She came to the ED today because she developed neck stiffness and pain in neck with movement, mild frontal headache and generalized weakness. No numbness, tingling, weakness, no nausea, vomiting, no changes in vision, hearing, no face or eye pain. She also notes fevers/chills at home.  No hx of autoimmune disease - RA, IBD, SLE. Developed hypothyroidism after pregnancy.  No hx of STI's, HIV.  No known exposure to persons with shingles, chickenpox.      WORKUP  CSF (4/28): CSF PCR negative , WBC <1, glucose 66 mg/dL, [id number] mg/dL     HIV-1/2 Combo Result: Nonreact (04-29    IMPRESSION  BL Dermatomal Rash with significant pain    RECOMMENDATIONS  Afebrile and negative CSF workup so far  No leukocytosis, however lymphocyte predominance  Pt is currently on empiric acyclovir 10mg/kg (70mg) q8h for disseminated Zoster   VZV PCR and IgG/IgM in lab  Reviewed photos in patients phone -> Doesnt appear to be vesicular and symmetic appearance points is atypical for VZV  Regardless, all lesions have crusted over and pt has been treated with 21 days of valacyclovir -> Recommend stop Acyclovir and DC isolation  Unclear eitiology of pain: Possible Post herpetic neuralgia??  Recommend dermat eval    Pt seen and examined. Case d/w attending and MS teams messaged Primary team    Nehemias Arguelles MD, PGY4   ID fellow  [employer] Teams Preferred  After 5pm/weekends call [phone]   Ms Rolon is 41 year old lady with PMH of hypothyroidism who presents with painful evolving rash x 3 weeks with new neck stiffness/pain. 3 weeks ago, developed red painful welts like rash on the back of the neck.  She went to urgent care and was prescribed valacyclovir PO 1g TID, however rash spread to the bilaterally to the right and left sides of neck and this week to bilateral breasts and left inguinal region.  She came to the ED today because she developed neck stiffness and pain in neck with movement, mild frontal headache and generalized weakness. No numbness, tingling, weakness, no nausea, vomiting, no changes in vision, hearing, no face or eye pain. She also notes fevers/chills at home.  No hx of autoimmune disease - RA, IBD, SLE. Developed hypothyroidism after pregnancy.  No hx of STI's, HIV.  No known exposure to persons with shingles, chickenpox.      WORKUP  CSF (4/28): CSF PCR negative , WBC <1, glucose 66 mg/dL, [id number] mg/dL     HIV-1/2 Combo Result: Nonreact (04-29    IMPRESSION  Bilateral Rash with significant pain    RECOMMENDATIONS  Afebrile and negative CSF workup so far  No leukocytosis, however lymphocyte predominance  Pt is currently on empiric acyclovir 10mg/kg (70mg) q8h for disseminated Zoster   VZV PCR and IgG/IgM in lab  Reviewed photos in patients phone -> Doesnt appear to be vesicular and symmetic appearance points is atypical for VZV  Regardless, all lesions have crusted over and pt has been treated with 21 days of valacyclovir -> Recommend stop Acyclovir and DC isolation  Unclear eitiology of pain: Possible Post herpetic neuralgia??  Recommend dermatology eval    Pt seen and examined. Case d/w attending and MS teams messaged Primary team    Nehemias Arguelles MD, PGY4   ID fellow  [employer] Teams Preferred  After 5pm/weekends call [phone]

## 2022-04-29 NOTE — PROGRESS NOTE ADULT - SUBJECTIVE AND OBJECTIVE BOX
Internal Medicine   Sarah Monsivais | PGY-1    OVERNIGHT EVENTS: ANAT      SUBJECTIVE: Patient was seen and examined at bedside this morning. Reports neck stiffness and pain, HA, burning rash on neck, beneath breasts, R rib and L groin. No numbness/tingling. Denies nausea/vomiting/diarrhea, abdominal pain, shortness of breath, or chest pain/palpitations. Patient responding appropriately to questions and able to make needs known.       MEDICATIONS  (STANDING):  acyclovir IVPB 700 milliGRAM(s) IV Intermittent every 8 hours  acyclovir IVPB      calamine/zinc oxide Lotion 1 Application(s) Topical four times a day  lactated ringers. 1000 milliLiter(s) (100 mL/Hr) IV Continuous <Continuous>  levothyroxine 125 MICROGram(s) Oral daily    MEDICATIONS  (PRN):  acetaminophen     Tablet .. 650 milliGRAM(s) Oral every 6 hours PRN Temp greater or equal to 38C (100.4F), Mild Pain (1 - 3)  ketorolac   Injectable 15 milliGRAM(s) IV Push every 8 hours PRN Severe Pain (7 - 10)  oxyCODONE    IR 5 milliGRAM(s) Oral every 4 hours PRN Severe Pain (7 - 10)        T(F): 98.1 (04-29-22 @ 04:58), Max: 98.3 (04-28-22 @ 20:35)  HR: 51 (04-29-22 @ 04:58) (48 - 73)  BP: 98/59 (04-29-22 @ 04:58) (98/59 - 138/90)  BP(mean): --  RR: 18 (04-29-22 @ 04:58) (18 - 18)  SpO2: 98% (04-29-22 @ 04:58) (98% - 99%)    PHYSICAL EXAM:     GENERAL: NAD, lying in bed comfortably.  HEAD:  Atraumatic, normocephalic.  EYES: PERRL, conjunctiva and sclera clear.  ENT: Moist mucous membranes.   NECK: Limited ROM. No LAD.  CHEST/LUNG: CTAB. No rales, rhonchi, wheezing, or rubs. Unlabored respirations.  HEART: RRR, no M/R/G, normal S1/S2.  ABDOMEN: Soft, nontender, nondistended, no organomegaly. Normoactive bowel sounds.  EXTREMITIES:  2+ peripheral pulses b/l. No clubbing, cyanosis, or edema.  MSK: No gross deformities noted.   NEURO:  AAOx3, no focal deficits.   SKIN: Dry, nonraised hyperpigmented, linear rash seen beneath b/l breasts, linearly along upper R rib and along L inguinal area.  PSYCH: Normal mood, affect.        LABS:                        11.6   6.55  )-----------( 265      ( 29 Apr 2022 07:32 )             35.5     04-29    141  |  107  |  15  ----------------------------<  111<H>  3.7   |  23  |  0.75    Ca    8.4      29 Apr 2022 07:32    TPro  7.1  /  Alb  4.3  /  TBili  0.2  /  DBili  x   /  AST  13  /  ALT  10  /  AlkPhos  55  04-28        PT/INR - ( 28 Apr 2022 21:11 )   PT: 11.5 sec;   INR: 1.00 ratio         PTT - ( 28 Apr 2022 21:11 )  PTT:32.0 sec    Cerebrospinal Fluid Cell Count-1 (04.28.22 @ 22:32)   CSF Appearance: Clear   CSF Segmented Neutrophils: See Note: Mononuclear cells only present on review of cytospin.   Tube Type: Tube 1   CSF Color: No Color   Total Nucleated Cell Count, CSF: <1   RBC Count - Spinal Fluid: 106 /uL     Glucose, CSF: 66 mg/dL (04.28.22 @ 22:32)   Protein, CSF: 20 mg/dL (04.28.22 @ 22:32)   Lactate Dehydrogenase, CSF: <15: TEST REPEATED.     Cerebrospinal Fluid Cell Count-1 (04.28.22 @ 22:33)   CSF Segmented Neutrophils: See Note   Tube Type: Tube 4   CSF Appearance: Clear   CSF Color: No Color   Total Nucleated Cell Count, CSF: <1   RBC Count - Spinal Fluid: 1 /uL Gram Stain - RRL (04.28.22 @ 22:36)   Gram Stain - RRL:   Source:   ---------- STAINS /PREPS REPORT ----   NO ORGANISM SEEN   RBC=NONE   WBC= NONE   Culture - CSF with Gram Stain . (04.29.22 @ 00:33)   Gram Stain:   No polymorphonuclear cells seen   No organisms seen by cytocentrifuge   Specimen Source: .CSF CSF   CSF PCR Panel (04.29.22 @ 01:01)   CSF PCR Result: NotDetec: The meningitis/encephalitis (ME) panel (CSFPCR) is a PCR based assay that   screens for: Escherichia coli; Haemophilus influenzae; Listeria   monocytogenes; Neisseria meningitidis; Streptococcus agalactiae;   Streptococcus pneumoniae; CMV; Enterovirus; HSV-1; HSV-2; Human   herpesvirus 6; Parechovirus; VZV and Cryptococcus. Result should be   interpreted in context of clinical presentation, imaging and other lab   tests. Positive predictive value may be lower in patients with normal CSF   chemistry and cell count.     Cerebrospinal Fluid Cell Count-1 (04.28.22 @ 22:33)   CSF Segmented Neutrophils: See Note   Tube Type: Tube 4   CSF Appearance: Clear   CSF Color: No Color   Total Nucleated Cell Count, CSF: <1   RBC Count - Spinal Fluid: 1 /uL     Creatinine Trend: 0.75<--, 0.80<--  I&O's Summary           Internal Medicine   Sarah Monsivais | PGY-1    OVERNIGHT EVENTS: ANAT      SUBJECTIVE: Patient was seen and examined at bedside this morning. Reports neck stiffness and pain, HA, burning rash on neck, beneath breasts, R rib and L groin. No numbness/tingling. Denies nausea/vomiting/diarrhea, abdominal pain, shortness of breath, or chest pain. Patient responding appropriately to questions and able to make needs known.       MEDICATIONS  (STANDING):  acyclovir IVPB 700 milliGRAM(s) IV Intermittent every 8 hours  acyclovir IVPB      calamine/zinc oxide Lotion 1 Application(s) Topical four times a day  lactated ringers. 1000 milliLiter(s) (100 mL/Hr) IV Continuous <Continuous>  levothyroxine 125 MICROGram(s) Oral daily    MEDICATIONS  (PRN):  acetaminophen     Tablet .. 650 milliGRAM(s) Oral every 6 hours PRN Temp greater or equal to 38C (100.4F), Mild Pain (1 - 3)  ketorolac   Injectable 15 milliGRAM(s) IV Push every 8 hours PRN Severe Pain (7 - 10)  oxyCODONE    IR 5 milliGRAM(s) Oral every 4 hours PRN Severe Pain (7 - 10)        T(F): 98.1 (04-29-22 @ 04:58), Max: 98.3 (04-28-22 @ 20:35)  HR: 51 (04-29-22 @ 04:58) (48 - 73)  BP: 98/59 (04-29-22 @ 04:58) (98/59 - 138/90)  BP(mean): --  RR: 18 (04-29-22 @ 04:58) (18 - 18)  SpO2: 98% (04-29-22 @ 04:58) (98% - 99%)    PHYSICAL EXAM:     GENERAL: NAD, lying in bed comfortably.  HEAD:  Atraumatic, normocephalic.  EYES: PERRL, conjunctiva and sclera clear.  ENT: Moist mucous membranes.   NECK: Limited ROM. No LAD.  CHEST/LUNG: CTAB. No rales, rhonchi, wheezing, or rubs. Unlabored respirations.  HEART: RRR, no M/R/G, normal S1/S2.  ABDOMEN: Soft, nontender, nondistended, no organomegaly. Normoactive bowel sounds.  EXTREMITIES:  2+ peripheral pulses b/l. No clubbing, cyanosis, or edema.  MSK: No gross deformities noted.   NEURO:  AAOx3, no focal deficits. Brudzinski sign negative.  SKIN: Dry, nonraised hyperpigmented, linear rash seen beneath b/l breasts, linearly along upper R rib and along L inguinal area.  PSYCH: Normal mood, affect.        LABS:                        11.6   6.55  )-----------( 265      ( 29 Apr 2022 07:32 )             35.5     04-29    141  |  107  |  15  ----------------------------<  111<H>  3.7   |  23  |  0.75    Ca    8.4      29 Apr 2022 07:32    TPro  7.1  /  Alb  4.3  /  TBili  0.2  /  DBili  x   /  AST  13  /  ALT  10  /  AlkPhos  55  04-28        PT/INR - ( 28 Apr 2022 21:11 )   PT: 11.5 sec;   INR: 1.00 ratio       PTT - ( 28 Apr 2022 21:11 )  PTT:32.0 sec    HIV-1/2 Combo Result: Nonreact (04.29.22 @ 08:50)   Lipid Profile (04.29.22 @ 08:50)   Cholesterol, Serum: 152 mg/dL   Triglycerides, Serum: 84 mg/dL   HDL Cholesterol, Serum: 55 mg/dL   Non HDL Cholesterol: 97 (mg/dL)   Cerebrospinal Fluid Cell Count-1 (04.28.22 @ 22:32)   CSF Appearance: Clear   CSF Segmented Neutrophils: See Note: Mononuclear cells only present on review of cytospin.   Tube Type: Tube 1   CSF Color: No Color   Total Nucleated Cell Count, CSF: <1   RBC Count - Spinal Fluid: 106 /uL     Glucose, CSF: 66 mg/dL (04.28.22 @ 22:32)   Protein, CSF: 20 mg/dL (04.28.22 @ 22:32)   Lactate Dehydrogenase, CSF: <15: TEST REPEATED.   Cerebrospinal Fluid Cell Count-1 (04.28.22 @ 22:33)   CSF Segmented Neutrophils: See Note   Tube Type: Tube 4   CSF Appearance: Clear   CSF Color: No Color   Total Nucleated Cell Count, CSF: <1   RBC Count - Spinal Fluid: 1 /uL Gram Stain - RRL (04.28.22 @ 22:36)   Gram Stain - RRL:   Source:   ---------- STAINS /PREPS REPORT ----   NO ORGANISM SEEN   RBC=NONE   WBC= NONE   Culture - CSF with Gram Stain . (04.29.22 @ 00:33)   Gram Stain:   No polymorphonuclear cells seen   No organisms seen by cytocentrifuge   Specimen Source: .CSF CSF   CSF PCR Panel (04.29.22 @ 01:01)   CSF PCR Result: NotDete: The meningitis/encephalitis (ME) panel (CSFPCR) is a PCR based assay that   screens for: Escherichia coli; Haemophilus influenzae; Listeria   monocytogenes; Neisseria meningitidis; Streptococcus agalactiae;   Streptococcus pneumoniae; CMV; Enterovirus; HSV-1; HSV-2; Human   herpesvirus 6; Parechovirus; VZV and Cryptococcus. Result should be   interpreted in context of clinical presentation, imaging and other lab   tests. Positive predictive value may be lower in patients with normal CSF   chemistry and cell count.     Cerebrospinal Fluid Cell Count-1 (04.28.22 @ 22:33)   CSF Segmented Neutrophils: See Note   Tube Type: Tube 4   CSF Appearance: Clear   CSF Color: No Color   Total Nucleated Cell Count, CSF: <1   RBC Count - Spinal Fluid: 1 /uL     Creatinine Trend: 0.75<--, 0.80<--  I&O's Summary

## 2022-04-29 NOTE — DISCHARGE NOTE PROVIDER - HOSPITAL COURSE
HPI:  41F with hypothyroidism presents with painful evolving rash x3 weeks with new neck stiffness/pain. 3 weeks ago, developed red painful welts like rash on the back of the neck.  She went to urgent care and was prescribed valacyclovir PO 1g TID, however rash spread to the bilaterally to the right and left sides of neck.  She saw PCP Dr. Casarez last week because rash was not improving, he extended her valacyclovir. This past week she started to develop a similar rash undersides of bilateral breasts and left inguinal region.  She came to the ED today because she developed neck stiffness and pain in neck with movement, mild frontal headache. + generalized weakness. No numbness, tingling, weakness, no nausea, vomiting, no changes in vision, hearing, no face or eye pain. Also notes fevers/chills at home.  No hx of autoimmune disease - RA, IBD, SLE. Developed hypothyroidism after pregnancy.  No hx of STI's, HIV.  No known exposure to persons with shingles, chickenpox.    Hospital course:   41F with hypothyroidism presents with painful evolving rash x3 weeks with new neck stiffness/pain believed to be disseminated shingles OP and started on valacyclovir (s/p 2 week course). Initially red painful welts like rash on the back of the neck that spread bilaterally to the right and left sides of neck and later bilateral breasts and left inguinal region.  Day prior to admission patient developed neck stiffness and pain in neck with movement, mild frontal headache + generalized weakness. No numbness, tingling, weakness, no nausea, vomiting, no changes in vision, hearing, no face or eye pain. Also notes fevers/chills at home.  No hx of autoimmune disease - RA, IBD, SLE. Developed hypothyroidism after pregnancy.  No hx of STI's, HIV.  No known exposure to persons with shingles, chickenpox. Patient worked up for VZV and meningitis; however, neither workup was positive. Patient later reported severe HA, worse when sitting/standing, believed to be post-dural HA. Started on Esgic with mild relief; also given LR and encourage caffeine intake. Dermatology consulted, and believed rash to be intertrigo, started patient on clotrimazole and hydrocortisone 2.5% creams.    Patient is now hemodynamically stable and medically optimized for discharge home with referrals to appropriate clinics. 41F with hypothyroidism presents with painful evolving rash x3 weeks with new neck stiffness/pain believed to be disseminated shingles OP and started on valacyclovir (s/p 2 week course). Initially red painful welts like rash on the back of the neck that spread bilaterally to the right and left sides of neck and later bilateral breasts and left inguinal region. Day prior to admission patient developed neck stiffness and pain in neck with movement, mild frontal headache + generalized weakness. No numbness, tingling, weakness, no nausea, vomiting, no changes in vision, hearing, no face or eye pain. Also notes fevers/chills at home.  No hx of autoimmune disease - RA, IBD, SLE. Developed hypothyroidism after pregnancy.  No hx of STI's, HIV.  No known exposure to persons with shingles, chickenpox. Patient worked up for VZV and meningitis with LP with assistance from ID; however, neither workup was positive. Patient later reported severe HA, worse when sitting/standing, believed to be post-dural HA. Started on Esgic with mild relief; also given LR and encouraged caffeine intake. Seen by Anesthesia with blood patch placed and significant relief obtained. Dermatology consulted, and believed rash to be intertrigo, started patient on clotrimazole and hydrocortisone 2.5% creams. Rash almost resolved, patient to follow up with Dermatology outpatient.     Patient is now hemodynamically stable and medically optimized for discharge home with referrals to appropriate clinics - her PCP, Dermatology, and Neurology.

## 2022-04-29 NOTE — ED ADULT NURSE REASSESSMENT NOTE - NS ED NURSE REASSESS COMMENT FT1
Admitting team paged [phone] to be made aware of pt's pain & EKG. Admitting team paged [phone] to be made aware of pt's pain & EKG.

## 2022-04-29 NOTE — DISCHARGE NOTE PROVIDER - CARE PROVIDER_API CALL
LINDSAY BELCHER  Internal Medicine  [address] [place] [id number]  Phone: [phone]  Fax: [phone]  Established Patient  Follow Up Time: 2 weeks   LINDSAY BELCHER  Internal Medicine  [address] [place] [id number]  Phone: [phone]  Fax: [phone]  Established Patient  Follow Up Time: 1 week

## 2022-04-29 NOTE — CONSULT NOTE ADULT - SUBJECTIVE AND OBJECTIVE BOX
Patient is a 41y old  Female who presents with a chief complaint of rash and neck pain (2022 07:07)    HPI: Ms Rolon is 41 year old lady with PMH of hypothyroidism who presents with painful evolving rash x 3 weeks with new neck stiffness/pain. 3 weeks ago, developed red painful welts like rash on the back of the neck.  She went to urgent care and was prescribed valacyclovir PO 1g TID, however rash spread to the bilaterally to the right and left sides of neck and this week to bilateral breasts and left inguinal region.  She came to the ED today because she developed neck stiffness and pain in neck with movement, mild frontal headache and generalized weakness. No numbness, tingling, weakness, no nausea, vomiting, no changes in vision, hearing, no face or eye pain. She also notes fevers/chills at home.  No hx of autoimmune disease - RA, IBD, SLE. Developed hypothyroidism after pregnancy.  No hx of STI's, HIV.  No known exposure to persons with shingles, chickenpox.      REVIEW OF SYSTEMS  [  ] ROS unobtainable because:    [ x ] All other systems negative except as noted below    Constitutional:  [ ] fever [ ] chills  [ ] weight loss  [ ]night sweat  [ ]poor appetite/PO intake [ ]fatigue   Skin:  [ ] rash [ ] phlebitis	  Eyes: [ ] icterus [ ] pain  [ ] discharge	  ENMT: [ ] sore throat  [ ] thrush [ ] ulcers [ ] exudates [ ]anosmia  Respiratory: [ ] dyspnea [ ] hemoptysis [ ] cough [ ] sputum	  Cardiovascular:  [ ] chest pain [ ] palpitations [ ] edema	  Gastrointestinal:  [ ] nausea [ ] vomiting [ ] diarrhea [ ] constipation [ ] pain	  Genitourinary:  [ ] dysuria [ ] frequency [ ] hematuria [ ] discharge [ ] flank pain  [ ] incontinence  Musculoskeletal:  [ ] myalgias [ ] arthralgias [ ] arthritis  [ ] back pain  Neurological:  [ ] headache [ ] weakness [ ] seizures  [ ] confusion/altered mental status    prior hospital charts reviewed [V]  primary team notes reviewed [V]  other consultant notes reviewed [V]    PAST MEDICAL & SURGICAL HISTORY:  Asthma  Hypothyroid  Asthma  Atrophy of kidney  Hypertension  H/O breast augmentation  History of   H/O:     SOCIAL HISTORY:  - Denied smoking/vaping/alcohol/recreational drug use    FAMILY HISTORY:  FH: CAD (coronary artery disease) (Mother)        Allergies  No Known Allergies        ANTIMICROBIALS:  acyclovir IVPB 700 every 8 hours  acyclovir IVPB        ANTIMICROBIALS (past 90 days):  MEDICATIONS  (STANDING):  acyclovir IVPB   264 mL/Hr IV Intermittent (22 @ 00:17)    acyclovir IVPB   264 mL/Hr IV Intermittent (22 @ 06:05)        OTHER MEDS:   MEDICATIONS  (STANDING):  acetaminophen     Tablet .. 650 every 6 hours PRN  ketorolac   Injectable 15 every 8 hours PRN  levothyroxine 125 daily  oxyCODONE    IR 5 every 4 hours PRN      VITALS:  Vital Signs Last 24 Hrs  T(F): 97.5 (22 @ 12:47), Max: 98.3 (22 @ 20:35)    Vital Signs Last 24 Hrs  HR: 50 (22 @ 12:47) (48 - 73)  BP: 126/79 (22 @ 12:47) (98/59 - 138/90)  RR: 18 (22 @ 12:47)  SpO2: 96% (22 @ 12:47) (96% - 99%)  Wt(kg): --    EXAM:    GA: NAD, AOx3  HEENT: oral cavity no lesion  CV: nl S1/S2, no RMG  Lungs: CTAB, No distress  Abd: BS+, soft, nontender, no rebounding pain  Ext: no edema  Neuro: No focal deficits  Skin: Intact  IV: no phlebitis    Labs:                        11.6   6.55  )-----------( 265      ( 2022 07:32 )             35.5         141  |  107  |  15  ----------------------------<  111<H>  3.7   |  23  |  0.75    Ca    8.4      2022 07:32    TPro  7.1  /  Alb  4.3  /  TBili  0.2  /  DBili  x   /  AST  13  /  ALT  10  /  AlkPhos  55        WBC Trend:  WBC Count: 6.55 (22 @ 07:32)  WBC Count: 8.37 (22 @ 21:11)      Auto Neutrophil #: 3.42 K/uL (22 @ 21:11)  Auto Neutrophil #: 6.03 K/uL (21 @ 15:41)      Creatine Trend:  Creatinine, Serum: 0.75 ()  Creatinine, Serum: 0.80 ()      Liver Biochemical Testing Trend:  Alanine Aminotransferase (ALT/SGPT): 10 ()  Aspartate Aminotransferase (AST/SGOT): 13 (22 @ 21:11)  Bilirubin Total, Serum: 0.2 ()  Trend LDH  Auto Eosinophil %: 5.0 % (22 @ 21:11)    MICROBIOLOGY:  Culture - CSF with Gram Stain (collected 2022 00:33)  Source: .CSF CSF    Culture - Urine (collected 04 Dec 2021 19:01)  Source: Clean Catch Clean Catch (Midstream)  Final Report:    <10,000 CFU/mL Normal Urogenital Jory    Culture - Urine (collected 2021 22:03)  Source: Clean Catch Clean Catch (Midstream)  Final Report:    <10,000 CFU/mL Normal Urogenital Jory      HIV-1/2 Combo Result: Nonreact (22 @ 08:50)    CSF PCR Result: NotDetec (22 @ 01:01)  CSF Appearance: Clear (22 @ 22:33)  Total Nucleated Cell Count, CSF: <1 (22 @ 22:33)  RBC Count - Spinal Fluid: 1 /uL (22 @ 22:33)  CSF Segmented Neutrophils: See Note (22 @ 22:33)  CSF Appearance: Clear (22 @ 22:32)  Total Nucleated Cell Count, CSF: <1 (22 @ 22:32)  RBC Count - Spinal Fluid: 106 /uL (22 @ 22:32)  CSF Segmented Neutrophils: See Note (22 @ 22:32)  Glucose, CSF: 66 mg/dL (22 @ 22:32)  Protein, CSF: 20 mg/dL (22 @ 22:32)  Lactate Dehydrogenase, CSF: <15 U/L (22 @ 22:32)  Protein, CSF: 20 mg/dL (22 @ 22:32)        RADIOLOGY:  imaging below personally reviewed                     Patient is a 41y old  Female who presents with a chief complaint of rash and neck pain (2022 07:07)    HPI: Ms Rolon is 41 year old lady with PMH of hypothyroidism who presents with painful evolving rash x 3 weeks with new neck stiffness/pain x 1 day. 3 weeks ago, developed red painful welts like rash on the back of the neck.  She went to urgent care and was prescribed valacyclovir PO 1g TID, however rash spread to the bilaterally to the right and left sides of neck and this week to bilateral breasts and left inguinal region.  She came to the ED today because she developed neck stiffness and pain in neck with movement, mild frontal headache and generalized weakness. No numbness, tingling, weakness, no nausea, vomiting, no changes in vision, hearing, no face or eye pain. She also notes subjective fevers/chills at home.  No hx of autoimmune disease - RA, IBD, SLE. Developed hypothyroidism after pregnancy.  No hx of STI's, HIV.  No known exposure to persons with shingles, She had chickenpox as a child. Afebrile with no leukocytosis. Pt had LP for neck pain with negative CSF workup so far. Pt reports post LP headache, Photophobia and nausea. No new rashs since last 4 days. Rashes have crusted over. however very painful (burning with stabbing pain)    REVIEW OF SYSTEMS  [  ] ROS unobtainable because:    [ x ] All other systems negative except as noted below    Constitutional:  [ ] fever [ ] chills  [ ] weight loss  [ ]night sweat  [ ]poor appetite/PO intake [ ]fatigue   Skin:  [ ] rash [ ] phlebitis	  Eyes: [ ] icterus [ ] pain  [ ] discharge	  ENMT: [ ] sore throat  [ ] thrush [ ] ulcers [ ] exudates [ ]anosmia  Respiratory: [ ] dyspnea [ ] hemoptysis [ ] cough [ ] sputum	  Cardiovascular:  [ ] chest pain [ ] palpitations [ ] edema	  Gastrointestinal:  [ ] nausea [ ] vomiting [ ] diarrhea [ ] constipation [ ] pain	  Genitourinary:  [ ] dysuria [ ] frequency [ ] hematuria [ ] discharge [ ] flank pain  [ ] incontinence  Musculoskeletal:  [ ] myalgias [ ] arthralgias [ ] arthritis  [ ] back pain  Neurological:  [ ] headache [ ] weakness [ ] seizures  [ ] confusion/altered mental status    prior hospital charts reviewed [V]  primary team notes reviewed [V]  other consultant notes reviewed [V]    PAST MEDICAL & SURGICAL HISTORY:  Asthma  Hypothyroid  Asthma  Atrophy of kidney  Hypertension  H/O breast augmentation  History of   H/O:     SOCIAL HISTORY:  - Denied smoking/vaping/recreational drug use  - Has a dog. No other animal exposure  - Last month went to [place] with daughter (Traveled within city). No outdoor exposure  - Occasional alcohol  - NO sick contacts    FAMILY HISTORY:  FH: CAD (coronary artery disease) (Mother)    Allergies  No Known Allergies    ANTIMICROBIALS:  acyclovir IVPB 700 every 8 hours  acyclovir IVPB        ANTIMICROBIALS (past 90 days):  MEDICATIONS  (STANDING):  acyclovir IVPB   264 mL/Hr IV Intermittent (22 @ 00:17)    acyclovir IVPB   264 mL/Hr IV Intermittent (22 @ 06:05)    OTHER MEDS:   MEDICATIONS  (STANDING):  acetaminophen     Tablet .. 650 every 6 hours PRN  ketorolac   Injectable 15 every 8 hours PRN  levothyroxine 125 daily  oxyCODONE    IR 5 every 4 hours PRN      VITALS:  Vital Signs Last 24 Hrs  T(F): 97.5 (22 @ 12:47), Max: 98.3 (22 @ 20:35)    Vital Signs Last 24 Hrs  HR: 50 (22 @ 12:47) (48 - 73)  BP: 126/79 (22 @ 12:47) (98/59 - 138/90)  RR: 18 (22 @ 12:47)  SpO2: 96% (22 @ 12:47) (96% - 99%)  Wt(kg): --    EXAM:    PHYSICAL EXAM:  General:  non-toxic, AOx3  HEAD/EYES: PERRL, white sclera   ENT:  normal, No thrush and no pharyngeal exudate  Neck: Supple  Cardiovascular:   No murmur,  normal S1 and S2  Respiratory: clear to ausculation bilaterally  GI:  soft, non-tender, normal bowel sounds  :  powell, no CVA tenderness   Musculoskeletal:  no synovitis  Neurologic: non-focal exam   Skin: no rash  Lymph:  no lymphadenopathy  Psychiatric: Cooperative, appropriate affect, alert & oriented  Lines:  no phlebitis           Labs:                        11.6   6.55  )-----------( 265      ( 2022 07:32 )             35.5         141  |  107  |  15  ----------------------------<  111<H>  3.7   |  23  |  0.75    Ca    8.4      2022 07:32    TPro  7.1  /  Alb  4.3  /  TBili  0.2  /  DBili  x   /  AST  13  /  ALT  10  /  AlkPhos  55        WBC Trend:  WBC Count: 6.55 (22 @ 07:32)  WBC Count: 8.37 (22 @ 21:11)      Auto Neutrophil #: 3.42 K/uL (22 @ 21:11)  Auto Neutrophil #: 6.03 K/uL (21 @ 15:41)      Creatine Trend:  Creatinine, Serum: 0.75 ()  Creatinine, Serum: 0.80 ()      Liver Biochemical Testing Trend:  Alanine Aminotransferase (ALT/SGPT): 10 ()  Aspartate Aminotransferase (AST/SGOT): 13 (22 @ 21:11)  Bilirubin Total, Serum: 0.2 ()  Trend LDH  Auto Eosinophil %: 5.0 % (22 @ 21:11)    MICROBIOLOGY:  Culture - CSF with Gram Stain (collected 2022 00:33)  Source: .CSF CSF    Culture - Urine (collected 04 Dec 2021 19:01)  Source: Clean Catch Clean Catch (Midstream)  Final Report:    <10,000 CFU/mL Normal Urogenital Jory    Culture - Urine (collected 2021 22:03)  Source: Clean Catch Clean Catch (Midstream)  Final Report:    <10,000 CFU/mL Normal Urogenital Jory      HIV-1/2 Combo Result: Nonreact (22 @ 08:50)    CSF PCR Result: NotDetec (22 @ 01:01)  CSF Appearance: Clear (22 @ 22:33)  Total Nucleated Cell Count, CSF: <1 (22 @ 22:33)  RBC Count - Spinal Fluid: 1 /uL (22 @ 22:33)  CSF Segmented Neutrophils: See Note (22 @ 22:33)  CSF Appearance: Clear (22 @ 22:32)  Total Nucleated Cell Count, CSF: <1 (22 @ 22:32)  RBC Count - Spinal Fluid: 106 /uL (22 @ 22:32)  CSF Segmented Neutrophils: See Note (22 @ 22:32)  Glucose, CSF: 66 mg/dL (22 @ 22:32)  Protein, CSF: 20 mg/dL (22 @ 22:32)  Lactate Dehydrogenase, CSF: <15 U/L (22 @ 22:32)  Protein, CSF: 20 mg/dL (22 @ 22:32)        RADIOLOGY:  imaging below personally reviewed                     Patient is a 41y old  Female who presents with a chief complaint of rash and neck pain (2022 07:07)    HPI: Ms Rolon is 41 year old lady with PMH of hypothyroidism who presents with painful evolving rash x 3 weeks with new neck stiffness/pain x 1 day. 3 weeks ago, developed red painful welts like rash on the back of the neck.  She went to urgent care and was prescribed valacyclovir PO 1g TID, however rash spread to the bilaterally to the right and left sides of neck and this week to bilateral breasts and left inguinal region.  She came to the ED today because she developed neck stiffness and pain in neck with movement, mild frontal headache and generalized weakness. No numbness, tingling, weakness, no nausea, vomiting, no changes in vision, hearing, no face or eye pain. She also notes subjective fevers/chills at home.  No hx of autoimmune disease - RA, IBD, SLE. Developed hypothyroidism after pregnancy.  No hx of STI's, HIV.  No known exposure to persons with shingles, She had chickenpox as a child. Afebrile with no leukocytosis. Pt had LP for neck pain with negative CSF workup so far. Pt reports post LP headache, Photophobia and nausea. No new rashs since last 4 days. Rashes have crusted over. however very painful (burning with stabbing pain). Currently has headache, nausea and photophobia which started after LP.     REVIEW OF SYSTEMS  [  ] ROS unobtainable because:    [ x ] All other systems negative except as noted below    Constitutional:  [ ] fever [ ] chills  [ ] weight loss  [ ]night sweat  [ ]poor appetite/PO intake [ ]fatigue   Skin:  [ ] rash [ ] phlebitis	  Eyes: [ ] icterus [ ] pain  [ ] discharge	  ENMT: [ ] sore throat  [ ] thrush [ ] ulcers [ ] exudates [ ]anosmia  Respiratory: [ ] dyspnea [ ] hemoptysis [ ] cough [ ] sputum	  Cardiovascular:  [ ] chest pain [ ] palpitations [ ] edema	  Gastrointestinal:  [ ] nausea [ ] vomiting [ ] diarrhea [ ] constipation [ ] pain	  Genitourinary:  [ ] dysuria [ ] frequency [ ] hematuria [ ] discharge [ ] flank pain  [ ] incontinence  Musculoskeletal:  [ ] myalgias [ ] arthralgias [ ] arthritis  [ ] back pain  Neurological:  [ ] headache [ ] weakness [ ] seizures  [ ] confusion/altered mental status    prior hospital charts reviewed [V]  primary team notes reviewed [V]  other consultant notes reviewed [V]    PAST MEDICAL & SURGICAL HISTORY:  Asthma  Hypothyroid  Asthma  Atrophy of kidney  Hypertension  H/O breast augmentation  History of   H/O:     SOCIAL HISTORY:  - Denied smoking/vaping/recreational drug use  - Has a dog. No other animal exposure  - Last month went to [place] with daughter (Traveled within city). No outdoor exposure  - Occasional alcohol  - NO sick contacts    FAMILY HISTORY:  FH: CAD (coronary artery disease) (Mother)    Allergies  No Known Allergies    ANTIMICROBIALS:  acyclovir IVPB 700 every 8 hours  acyclovir IVPB        ANTIMICROBIALS (past 90 days):  MEDICATIONS  (STANDING):  acyclovir IVPB   264 mL/Hr IV Intermittent (22 @ 00:17)    acyclovir IVPB   264 mL/Hr IV Intermittent (22 @ 06:05)    OTHER MEDS:   MEDICATIONS  (STANDING):  acetaminophen     Tablet .. 650 every 6 hours PRN  ketorolac   Injectable 15 every 8 hours PRN  levothyroxine 125 daily  oxyCODONE    IR 5 every 4 hours PRN      VITALS:  Vital Signs Last 24 Hrs  T(F): 97.5 (22 @ 12:47), Max: 98.3 (22 @ 20:35)    Vital Signs Last 24 Hrs  HR: 50 (22 @ 12:47) (48 - 73)  BP: 126/79 (22 @ 12:47) (98/59 - 138/90)  RR: 18 (22 @ 12:47)  SpO2: 96% (22 @ 12:47) (96% - 99%)  Wt(kg): --    EXAM:    PHYSICAL EXAM:  General:  non-toxic, AOx3  HEAD/EYES: PERRL, white sclera   ENT:  normal, No thrush and no pharyngeal exudate  Neck: mild stiffness. Dried papular lesions with copious calamine lotion applied over it  Cardiovascular:   No murmur,  normal S1 and S2  Respiratory: clear to ausculation bilaterally  GI:  soft, non-tender, normal bowel sounds  : no powell, no CVA tenderness   Musculoskeletal:  no synovitis  Neurologic: non-focal exam   Skin: Dried papular lesions with copious calamine lotion applied over it symmetric over B/L Neck, B/L intertriginous region below breast and left groin  Lymph:  no lymphadenopathy  Psychiatric: Cooperative, appropriate affect, alert & oriented  Lines:  no phlebitis           Labs:                        11.6   6.55  )-----------( 265      ( 2022 07:32 )             35.5         141  |  107  |  15  ----------------------------<  111<H>  3.7   |  23  |  0.75    Ca    8.4      2022 07:32    TPro  7.1  /  Alb  4.3  /  TBili  0.2  /  DBili  x   /  AST  13  /  ALT  10  /  AlkPhos  55        WBC Trend:  WBC Count: 6.55 (22 @ 07:32)  WBC Count: 8.37 (22 @ 21:11)      Auto Neutrophil #: 3.42 K/uL (22 @ 21:11)  Auto Neutrophil #: 6.03 K/uL (21 @ 15:41)      Creatine Trend:  Creatinine, Serum: 0.75 ()  Creatinine, Serum: 0.80 ()      Liver Biochemical Testing Trend:  Alanine Aminotransferase (ALT/SGPT): 10 ()  Aspartate Aminotransferase (AST/SGOT): 13 (22 @ 21:11)  Bilirubin Total, Serum: 0.2 ()  Trend LDH  Auto Eosinophil %: 5.0 % (22 @ 21:11)    MICROBIOLOGY:  Culture - CSF with Gram Stain (collected 2022 00:33)  Source: .CSF CSF    Culture - Urine (collected 04 Dec 2021 19:01)  Source: Clean Catch Clean Catch (Midstream)  Final Report:    <10,000 CFU/mL Normal Urogenital Jory    Culture - Urine (collected 2021 22:03)  Source: Clean Catch Clean Catch (Midstream)  Final Report:    <10,000 CFU/mL Normal Urogenital Jory      HIV-1/2 Combo Result: Nonreact (22 @ 08:50)    CSF PCR Result: NotDetec (22 @ 01:01)  CSF Appearance: Clear (22 @ 22:33)  Total Nucleated Cell Count, CSF: <1 (22 @ 22:33)  RBC Count - Spinal Fluid: 1 /uL (22 @ 22:33)  CSF Segmented Neutrophils: See Note (22 @ 22:33)  CSF Appearance: Clear (22 @ 22:32)  Total Nucleated Cell Count, CSF: <1 (22 @ 22:32)  RBC Count - Spinal Fluid: 106 /uL (22 @ 22:32)  CSF Segmented Neutrophils: See Note (22 @ 22:32)  Glucose, CSF: 66 mg/dL (22 @ 22:32)  Protein, CSF: 20 mg/dL (22 @ 22:32)  Lactate Dehydrogenase, CSF: <15 U/L (22 @ 22:32)  Protein, CSF: 20 mg/dL (22 @ 22:32)        RADIOLOGY:  imaging below personally reviewed                     Patient is a 41y old  Female who presents with a chief complaint of rash and neck pain (2022 07:07)    HPI: Ms Rolon is 41 year old lady with PMH of hypothyroidism who presents with painful evolving rash x 3 weeks with new neck stiffness/pain x 1 day. 3 weeks ago, developed red painful welts like rash on the back of the neck.  She went to urgent care and was prescribed valacyclovir PO 1g TID, however rash spread to the bilaterally to the right and left sides of neck and this week to bilateral breasts and left inguinal region.  She came to the ED today because she developed neck stiffness and pain in neck with movement, mild frontal headache and generalized weakness. No numbness, tingling, weakness, no nausea, vomiting, no changes in vision, hearing, no face or eye pain. She also notes subjective fevers/chills at home.  No hx of autoimmune disease - RA, IBD, SLE. Developed hypothyroidism after pregnancy.  No hx of STI's, HIV.  No known exposure to persons with shingles, She had chickenpox as a child. Afebrile with no leukocytosis. Pt had LP for neck pain with negative CSF workup so far. Pt reports post LP headache, Photophobia and nausea. No new rashs since last 4 days. Rashes have crusted over. however very painful (burning with stabbing pain). Currently has headache, nausea and photophobia which started after LP.     REVIEW OF SYSTEMS  [  ] ROS unobtainable because:    [ x ] All other systems negative except as noted below    Constitutional:  [ ] fever [ ] chills  [ ] weight loss  [ ]night sweat  [ ]poor appetite/PO intake [ ]fatigue   Skin:  [ ] rash [ ] phlebitis	  Eyes: [ ] icterus [ ] pain  [ ] discharge	  ENMT: [ ] sore throat  [ ] thrush [ ] ulcers [ ] exudates [ ]anosmia  Respiratory: [ ] dyspnea [ ] hemoptysis [ ] cough [ ] sputum	  Cardiovascular:  [ ] chest pain [ ] palpitations [ ] edema	  Gastrointestinal:  [ ] nausea [ ] vomiting [ ] diarrhea [ ] constipation [ ] pain	  Genitourinary:  [ ] dysuria [ ] frequency [ ] hematuria [ ] discharge [ ] flank pain  [ ] incontinence  Musculoskeletal:  [ ] myalgias [ ] arthralgias [ ] arthritis  [ ] back pain  Neurological:  [ ] headache [ ] weakness [ ] seizures  [ ] confusion/altered mental status    prior hospital charts reviewed [V]  primary team notes reviewed [V]  other consultant notes reviewed [V]    PAST MEDICAL & SURGICAL HISTORY:  Asthma  Hypothyroid  Asthma  Atrophy of kidney  Hypertension  H/O breast augmentation  History of   H/O:     SOCIAL HISTORY:  - Denied smoking/vaping/recreational drug use  - Has a dog. No other animal exposure  - Last month went to [place] with daughter (Traveled within city). No outdoor exposure  - Occasional alcohol  - NO sick contacts    FAMILY HISTORY:  FH: CAD (coronary artery disease) (Mother)    Allergies  No Known Allergies    ANTIMICROBIALS:  acyclovir IVPB 700 every 8 hours  acyclovir IVPB        ANTIMICROBIALS (past 90 days):  MEDICATIONS  (STANDING):  acyclovir IVPB   264 mL/Hr IV Intermittent (22 @ 00:17)    acyclovir IVPB   264 mL/Hr IV Intermittent (22 @ 06:05)    OTHER MEDS:   MEDICATIONS  (STANDING):  acetaminophen     Tablet .. 650 every 6 hours PRN  ketorolac   Injectable 15 every 8 hours PRN  levothyroxine 125 daily  oxyCODONE    IR 5 every 4 hours PRN      VITALS:  Vital Signs Last 24 Hrs  T(F): 97.5 (22 @ 12:47), Max: 98.3 (22 @ 20:35)    Vital Signs Last 24 Hrs  HR: 50 (22 @ 12:47) (48 - 73)  BP: 126/79 (22 @ 12:47) (98/59 - 138/90)  RR: 18 (22 @ 12:47)  SpO2: 96% (22 @ 12:47) (96% - 99%)  Wt(kg): --    EXAM:    PHYSICAL EXAM:  General:  non-toxic, AOx3  HEAD/EYES: PERRL, white sclera   ENT:  normal, No thrush and no pharyngeal exudate  Neck: mild stiffness. Dried papular lesions with copious calamine lotion applied over it  Cardiovascular:   No murmur,  normal S1 and S2  Respiratory: clear to ausculation bilaterally  GI:  soft, non-tender, normal bowel sounds  : no powell, no CVA tenderness   Musculoskeletal:  no synovitis  Neurologic: non-focal exam   Skin: Dried papular lesions with copious calamine lotion applied over it symmetric over B/L Neck, B/L intertriginous region below breast and left groin  Lymph:  no lymphadenopathy  Psychiatric: Cooperative, appropriate affect, alert & oriented  Lines:  no phlebitis           Labs:                        11.6   6.55  )-----------( 265      ( 2022 07:32 )             35.5         141  |  107  |  15  ----------------------------<  111<H>  3.7   |  23  |  0.75    Ca    8.4      2022 07:32    TPro  7.1  /  Alb  4.3  /  TBili  0.2  /  DBili  x   /  AST  13  /  ALT  10  /  AlkPhos  55        WBC Trend:  WBC Count: 6.55 (22 @ 07:32)  WBC Count: 8.37 (22 @ 21:11)      Auto Neutrophil #: 3.42 K/uL (22 @ 21:11)  Auto Neutrophil #: 6.03 K/uL (21 @ 15:41)      Creatine Trend:  Creatinine, Serum: 0.75 ()  Creatinine, Serum: 0.80 ()      Liver Biochemical Testing Trend:  Alanine Aminotransferase (ALT/SGPT): 10 ()  Aspartate Aminotransferase (AST/SGOT): 13 (22 @ 21:11)  Bilirubin Total, Serum: 0.2 ()  Trend LDH  Auto Eosinophil %: 5.0 % (22 @ 21:11)    MICROBIOLOGY:  Culture - CSF with Gram Stain (collected 2022 00:33)  Source: .CSF CSF    Culture - Urine (collected 04 Dec 2021 19:01)  Source: Clean Catch Clean Catch (Midstream)  Final Report:    <10,000 CFU/mL Normal Urogenital Jory    Culture - Urine (collected 2021 22:03)  Source: Clean Catch Clean Catch (Midstream)  Final Report:    <10,000 CFU/mL Normal Urogenital Jory      HIV-1/2 Combo Result: Nonreact (22 @ 08:50)    CSF PCR Result: NotDetec (22 @ 01:01)  CSF Appearance: Clear (22 @ 22:33)  Total Nucleated Cell Count, CSF: <1 (22 @ 22:33)  RBC Count - Spinal Fluid: 1 /uL (22 @ :33)  CSF Segmented Neutrophils: See Note (22 @ :33)  CSF Appearance: Clear (22:32)  Total Nucleated Cell Count, CSF: <1 (22 @ :32)  RBC Count - Spinal Fluid: 106 /uL (22:32)  CSF Segmented Neutrophils: See Note (22:32)  Glucose, CSF: 66 mg/dL (22:32)  Protein, CSF: 20 mg/dL (22 @ 22:32)  Lactate Dehydrogenase, CSF: <15 U/L (22 @ 22:32)  Protein, CSF: 20 mg/dL (22 22:32)        RADIOLOGY:    <The imaging below has been reviewed and visualized by me independently. Findings as detailed in report below>    < from: Xray Chest 1 View AP/PA (21 @ 15:13) >  IMPRESSION:  Clear lungs.    < end of copied text >

## 2022-04-29 NOTE — H&P ADULT - NSHPLABSRESULTS_GEN_ALL_CORE
LABS: Personally reviewed labs, imaging, and ECG                          12.7   8.37  )-----------( 322      ( 28 Apr 2022 21:11 )             38.7       04-28    140  |  105  |  16  ----------------------------<  97  4.1   |  23  |  0.80    Ca    9.1      28 Apr 2022 21:11    TPro  7.1  /  Alb  4.3  /  TBili  0.2  /  DBili  x   /  AST  13  /  ALT  10  /  AlkPhos  55  04-28       LIVER FUNCTIONS - ( 28 Apr 2022 21:11 )  Alb: 4.3 g/dL / Pro: 7.1 g/dL / ALK PHOS: 55 U/L / ALT: 10 U/L / AST: 13 U/L / GGT: x                        PT/INR - ( 28 Apr 2022 21:11 )   PT: 11.5 sec;   INR: 1.00 ratio         PTT - ( 28 Apr 2022 21:11 )  PTT:32.0 sec    Lactate Trend            CAPILLARY BLOOD GLUCOSE                RADIOLOGY & ADDITIONAL TESTS:

## 2022-04-29 NOTE — H&P ADULT - ASSESSMENT
41F with hypothyroidism presents with painful evolving rash x 3 weeks with new neck stiffness/pain, c/f disseminated herpes zoster infection and possible meningitis.

## 2022-04-29 NOTE — PROGRESS NOTE ADULT - PROBLEM SELECTOR PLAN 1
Painful evolving rash x 3 weeks with new neck stiffness/pain, c/f disseminated herpes zoster infection and possible meningitis.  No risk factors such as HIV, autoimmune diseases, immunosuppression, or exposures.  Significant neck stiffness and pain with movement, c/f complication of herpes zoster meningitis vs. subclinical meningeal irritation.  LP glucose, protein, cell count not suggestive of viral meningitis.  Not septic.    - Started on empiric acyclovir 10mg/kg (70mg) q8h  - s/p NS 1L.  will start maintenance LR 100cc/hr to prevent acyclovir-induced nephrotoxicity.   - add on VZV PCR to CSF  - check VZV PCR serum  - f/u CSF cultures  - ID consult in AM  - check VZV IgG?  - Pain control with tylenol PO for mild pain, toradol for mod-severe pain Painful evolving rash x 3 weeks with new neck stiffness/pain, c/f disseminated herpes zoster infection and possible meningitis.  No risk factors such as HIV, autoimmune diseases, immunosuppression, or exposures.  Significant neck stiffness and pain with movement, c/f complication of herpes zoster meningitis vs. subclinical meningeal irritation.  LP glucose, protein, cell count not suggestive of viral meningitis.  Not septic.    - c/w empiric acyclovir 10mg/kg (70mg) q8h  - s/p NS 1L.  will start maintenance LR 100cc/hr to prevent acyclovir-induced nephrotoxicity.   - CSF VZV PCR neg  - check VZV PCR serum  - f/u CSF cultures  - ID consult  - check VZV IgG?  - Pain control with Tylenol PO for mild pain, oxy 5 for mod-severe pain Painful evolving rash x 3 weeks with new neck stiffness/pain, c/f disseminated herpes zoster infection and possible meningitis.  No risk factors such as HIV, autoimmune diseases, immunosuppression, or exposures.  Significant neck stiffness and pain with movement, c/f complication of herpes zoster meningitis vs. subclinical meningeal irritation.  LP glucose, protein, cell count not suggestive of viral meningitis.  Not septic.    - c/w empiric acyclovir 10mg/kg (70mg) q8h  - s/p NS 1L.  will start maintenance LR 100cc/hr to prevent acyclovir-induced nephrotoxicity.   - ID consulted, recs appreciated  - HIV negative  - CSF VZV PCR neg  - f/u VZV PCR serum  - f/u CSF cultures  - f/u VZV IgG  - Pain control with Tylenol PO for mild pain, on oxy 5 for mod-severe pain Painful evolving rash x 3 weeks with new neck stiffness/pain, c/f disseminated herpes zoster infection and possible meningitis.  No risk factors such as HIV, autoimmune diseases, immunosuppression, or exposures.  Significant neck stiffness and pain with movement, c/f complication of herpes zoster meningitis vs. subclinical meningeal irritation.  LP glucose, protein, cell count not suggestive of viral meningitis.  Not septic.    - c/w empiric acyclovir 10mg/kg (70mg) q8h  - s/p NS 1L.  will start maintenance LR 100cc/hr to prevent acyclovir-induced nephrotoxicity.   - ID consulted, recs appreciated  - HIV negative  - f/u TFTs  - CSF VZV PCR neg  - f/u VZV PCR serum  - f/u CSF cultures  - f/u VZV IgG  - Pain control with Tylenol PO for mild pain, on oxy 5 for mod-severe pain

## 2022-04-29 NOTE — DISCHARGE NOTE PROVIDER - NSDCMRMEDTOKEN_GEN_ALL_CORE_FT
levothyroxine 125 mcg (0.125 mg) oral tablet: 1 tab(s) orally once a day   butalbital/acetaminophen/caffeine 50 mg-325 mg-40 mg oral tablet: 1 tab(s) orally every 6 hours, As needed, headache MDD:4 tabs  clotrimazole 1% topical cream: 1 application topically 2 times a day  hydrocortisone 2.5% topical lotion: 1 application topically 2 times a day  levothyroxine 125 mcg (0.125 mg) oral tablet: 1 tab(s) orally once a day

## 2022-04-29 NOTE — DISCHARGE NOTE PROVIDER - NSDCFUADDAPPT_GEN_ALL_CORE_FT
APPTS ARE READY TO BE MADE: [x] YES    Best Family or Patient Contact (if needed): [phone]    Additional Information about above appointments (if needed):    1: PCP   2: Dermatology   3:     Other comments or requests:

## 2022-04-29 NOTE — H&P ADULT - PROBLEM SELECTOR PLAN 1
Painful evolving rash x 3 weeks with new neck stiffness/pain, c/f disseminated herpes zoster infection and possible meningitis.  No risk factors such as HIV, autoimmune diseases, immunosuppression, or exposures.  Significant neck stiffness and pain with movement, c/f complication of herpes zoster meningitis vs. subclinical meningeal irritation.  LP glucose, protein, cell count not suggestive of viral meningitis.  Not septic.    - Started on empiric acyclovir 10mg/kg (70mg) q8h  - add on VZV PCR to CSF  - check VZV PCR serum  - f/u CSF cultures  - ID consult in AM  - check VZV IgG?  - Pain control with tylenol PO for mild pain, toradol for mod-severe pain Painful evolving rash x 3 weeks with new neck stiffness/pain, c/f disseminated herpes zoster infection and possible meningitis.  No risk factors such as HIV, autoimmune diseases, immunosuppression, or exposures.  Significant neck stiffness and pain with movement, c/f complication of herpes zoster meningitis vs. subclinical meningeal irritation.  LP glucose, protein, cell count not suggestive of viral meningitis.  Not septic.    - Started on empiric acyclovir 10mg/kg (70mg) q8h  - s/p NS 1L.  will start maintenance LR 100cc/hr to prevent acyclovir-induced nephrotoxicity.   - add on VZV PCR to CSF  - check VZV PCR serum  - f/u CSF cultures  - ID consult in AM  - check VZV IgG?  - Pain control with tylenol PO for mild pain, toradol for mod-severe pain

## 2022-04-29 NOTE — H&P ADULT - HISTORY OF PRESENT ILLNESS
41F with hypothyroidism presents with painful evolving rash x 3 weeks. Pt states she developing a painful red rash to the back of her neck approx 3 weeks ago and started on valacyclovir 1g TID however rash spread to the right side of her neck then her left side. She saw her pcp last week who extended her valacyclovir however this past week she started to develop a similar rash under BL breasts and to the left inguinal region. Pt states pain is unbearable, has been taking motrin without any improvement, states neck is now becoming still, has mild headache. No numbness, tingling, weakness, no nausea, vomiting, no changes in vision. pt states she had low grade fever today however unsure of how high. 41F with hypothyroidism presents with painful evolving rash x 3 weeks with new neck stiffness/pain. 3 weeks ago, developed red painful welts like rash on the back of the neck.  She went to urgent care and was prescribed valacyclovir PO 1g TID, however rash spread to the bilaterally to the right and left sides of neck.  She saw PCP Dr. Casarez last week because rash was not improving, he extended her valacyclovir. This past week she started to develop a similar rash undersides of bilateral breasts and left inguinal region.  She came to the ED today because she developed neck stiffness and pain in neck with movement, mild frontal headache. + generalized weakness. No numbness, tingling, weakness, no nausea, vomiting, no changes in vision, hearing, no face or eye pain. Also notes fevers/chills at home.  No hx of autoimmune disease - RA, IBD, SLE. Developed hypothyroidism after pregnancy.  No hx of STI's, HIV.  No known exposure to persons with shingles, chickenpox.

## 2022-04-29 NOTE — H&P ADULT - ATTENDING COMMENTS
41F with hypothyroidism presents with disseminated herpes zoster and concern for zoster meningitis. She was assessed at bedside. She describes the pain as a severe burning and stabbing pain which started in the back of her neck and now is present bilaterally under the breasts and in the inguinal area. She complains of severe pain at the time of exam and states the ketorolac which was given earlier did not help with the pain. The patient has a history of chicken pox as a child. Other than hypothyroidism, there are no other known medical conditions. On exam, the patient has a crusted rash about the back of the neck bilaterally and wraps around to the anterior portion of the neck. She also has a similar rash under the breasts bilaterally and inguinal area.      LP was performed in the ED.     #disseminated zoster   #aseptic meningitis     Continue acyclovir and continuous maintenance fluids to prevent nephrotoxicity. Given the patient’s young age and relatively healthy baseline, will evaluate for underlying immunocompromise state-- obtain HIV screening test, hgb a1c, and lipid profile with morning labs. Pain control with calamine lotion and oxycodone PRN. Please obtain ID consult in the morning. Rest of plan per resident note.

## 2022-04-30 LAB
ANION GAP SERPL CALC-SCNC: 15 MMOL/L — SIGNIFICANT CHANGE UP (ref 5–17)
BUN SERPL-MCNC: 7 MG/DL — SIGNIFICANT CHANGE UP (ref 7–23)
CALCIUM SERPL-MCNC: 9.3 MG/DL — SIGNIFICANT CHANGE UP (ref 8.4–10.5)
CHLORIDE SERPL-SCNC: 109 MMOL/L — HIGH (ref 96–108)
CO2 SERPL-SCNC: 20 MMOL/L — LOW (ref 22–31)
CREAT SERPL-MCNC: 0.53 MG/DL — SIGNIFICANT CHANGE UP (ref 0.5–1.3)
EGFR: 118 ML/MIN/1.73M2 — SIGNIFICANT CHANGE UP
GLUCOSE SERPL-MCNC: 86 MG/DL — SIGNIFICANT CHANGE UP (ref 70–99)
HCT VFR BLD CALC: 38.2 % — SIGNIFICANT CHANGE UP (ref 34.5–45)
HGB BLD-MCNC: 12.8 G/DL — SIGNIFICANT CHANGE UP (ref 11.5–15.5)
MAGNESIUM SERPL-MCNC: 1.7 MG/DL — SIGNIFICANT CHANGE UP (ref 1.6–2.6)
MCHC RBC-ENTMCNC: 32.2 PG — SIGNIFICANT CHANGE UP (ref 27–34)
MCHC RBC-ENTMCNC: 33.5 GM/DL — SIGNIFICANT CHANGE UP (ref 32–36)
MCV RBC AUTO: 96 FL — SIGNIFICANT CHANGE UP (ref 80–100)
NRBC # BLD: 0 /100 WBCS — SIGNIFICANT CHANGE UP (ref 0–0)
PHOSPHATE SERPL-MCNC: 3.2 MG/DL — SIGNIFICANT CHANGE UP (ref 2.5–4.5)
PLATELET # BLD AUTO: 320 K/UL — SIGNIFICANT CHANGE UP (ref 150–400)
POTASSIUM SERPL-MCNC: 3.8 MMOL/L — SIGNIFICANT CHANGE UP (ref 3.5–5.3)
POTASSIUM SERPL-SCNC: 3.8 MMOL/L — SIGNIFICANT CHANGE UP (ref 3.5–5.3)
RBC # BLD: 3.98 M/UL — SIGNIFICANT CHANGE UP (ref 3.8–5.2)
RBC # FLD: 13 % — SIGNIFICANT CHANGE UP (ref 10.3–14.5)
SODIUM SERPL-SCNC: 144 MMOL/L — SIGNIFICANT CHANGE UP (ref 135–145)
T3 SERPL-MCNC: 106 NG/DL — SIGNIFICANT CHANGE UP (ref 80–200)
T4 FREE+ TSH PNL SERPL: 0.19 UIU/ML — LOW (ref 0.27–4.2)
VZV IGM SER-ACNC: <0.91 INDEX — SIGNIFICANT CHANGE UP (ref 0–0.9)
WBC # BLD: 9.18 K/UL — SIGNIFICANT CHANGE UP (ref 3.8–10.5)
WBC # FLD AUTO: 9.18 K/UL — SIGNIFICANT CHANGE UP (ref 3.8–10.5)

## 2022-04-30 PROCEDURE — 99232 SBSQ HOSP IP/OBS MODERATE 35: CPT | Mod: GC

## 2022-04-30 PROCEDURE — 93010 ELECTROCARDIOGRAM REPORT: CPT

## 2022-04-30 RX ORDER — POTASSIUM CHLORIDE 20 MEQ
20 PACKET (EA) ORAL ONCE
Refills: 0 | Status: COMPLETED | OUTPATIENT
Start: 2022-04-30 | End: 2022-04-30

## 2022-04-30 RX ORDER — MAGNESIUM SULFATE 500 MG/ML
2 VIAL (ML) INJECTION ONCE
Refills: 0 | Status: COMPLETED | OUTPATIENT
Start: 2022-04-30 | End: 2022-04-30

## 2022-04-30 RX ORDER — HYDROCORTISONE 1 %
1 OINTMENT (GRAM) TOPICAL
Refills: 0 | Status: DISCONTINUED | OUTPATIENT
Start: 2022-04-30 | End: 2022-05-03

## 2022-04-30 RX ORDER — SODIUM CHLORIDE 9 MG/ML
1000 INJECTION, SOLUTION INTRAVENOUS ONCE
Refills: 0 | Status: COMPLETED | OUTPATIENT
Start: 2022-04-30 | End: 2022-04-30

## 2022-04-30 RX ORDER — SODIUM CHLORIDE 9 MG/ML
500 INJECTION, SOLUTION INTRAVENOUS
Refills: 0 | Status: DISCONTINUED | OUTPATIENT
Start: 2022-04-30 | End: 2022-04-30

## 2022-04-30 RX ORDER — SODIUM CHLORIDE 9 MG/ML
1000 INJECTION, SOLUTION INTRAVENOUS
Refills: 0 | Status: DISCONTINUED | OUTPATIENT
Start: 2022-04-30 | End: 2022-04-30

## 2022-04-30 RX ORDER — SODIUM CHLORIDE 9 MG/ML
500 INJECTION, SOLUTION INTRAVENOUS ONCE
Refills: 0 | Status: COMPLETED | OUTPATIENT
Start: 2022-04-30 | End: 2022-04-30

## 2022-04-30 RX ADMIN — Medication 1 APPLICATION(S): at 17:29

## 2022-04-30 RX ADMIN — CALAMINE AND ZINC OXIDE AND PHENOL 1 APPLICATION(S): 160; 10 LOTION TOPICAL at 05:29

## 2022-04-30 RX ADMIN — Medication 25 GRAM(S): at 17:30

## 2022-04-30 RX ADMIN — Medication 1 TABLET(S): at 17:50

## 2022-04-30 RX ADMIN — Medication 1 TABLET(S): at 12:15

## 2022-04-30 RX ADMIN — CALAMINE AND ZINC OXIDE AND PHENOL 1 APPLICATION(S): 160; 10 LOTION TOPICAL at 17:29

## 2022-04-30 RX ADMIN — CALAMINE AND ZINC OXIDE AND PHENOL 1 APPLICATION(S): 160; 10 LOTION TOPICAL at 23:51

## 2022-04-30 RX ADMIN — CALAMINE AND ZINC OXIDE AND PHENOL 1 APPLICATION(S): 160; 10 LOTION TOPICAL at 12:15

## 2022-04-30 RX ADMIN — SODIUM CHLORIDE 1000 MILLILITER(S): 9 INJECTION, SOLUTION INTRAVENOUS at 09:31

## 2022-04-30 RX ADMIN — Medication 1 TABLET(S): at 10:34

## 2022-04-30 RX ADMIN — SODIUM CHLORIDE 1000 MILLILITER(S): 9 INJECTION, SOLUTION INTRAVENOUS at 03:47

## 2022-04-30 RX ADMIN — Medication 15 MILLIGRAM(S): at 22:16

## 2022-04-30 RX ADMIN — Medication 15 MILLIGRAM(S): at 21:18

## 2022-04-30 RX ADMIN — Medication 1 APPLICATION(S): at 22:41

## 2022-04-30 RX ADMIN — Medication 1 TABLET(S): at 17:28

## 2022-04-30 RX ADMIN — Medication 125 MICROGRAM(S): at 05:29

## 2022-04-30 RX ADMIN — Medication 20 MILLIEQUIVALENT(S): at 17:28

## 2022-04-30 RX ADMIN — Medication 1 TABLET(S): at 01:25

## 2022-04-30 NOTE — PROGRESS NOTE ADULT - SUBJECTIVE AND OBJECTIVE BOX
Internal Medicine   Sarah Monsivais | PGY-1    OVERNIGHT EVENTS: HA requiring Esgic, only mildly improved. N/V as well.      SUBJECTIVE: Patient was seen and examined at bedside this morning. She reports severe HA that has worsened since yesterday, now 10/10 (8/10 when taking Esgic, but does not improve with other analgesics). Pain is worse when she sits or stands. Says she vomited overnight. Denies diarrhea or abdominal pain. Patient responding appropriately to questions and able to make needs known.        MEDICATIONS  (STANDING):  calamine/zinc oxide Lotion 1 Application(s) Topical four times a day  levothyroxine 125 MICROGram(s) Oral daily    MEDICATIONS  (PRN):  acetaminophen     Tablet .. 650 milliGRAM(s) Oral every 6 hours PRN Temp greater or equal to 38C (100.4F), Mild Pain (1 - 3)  acetaminophen 325 mG/butalbital 50 mG/caffeine 40 mG 1 Tablet(s) Oral every 6 hours PRN headache  ketorolac   Injectable 15 milliGRAM(s) IV Push every 8 hours PRN Severe Pain (7 - 10)  oxyCODONE    IR 5 milliGRAM(s) Oral every 4 hours PRN Severe Pain (7 - 10)        T(F): 98 (04-30-22 @ 05:03), Max: 98 (04-30-22 @ 05:03)  HR: 53 (04-30-22 @ 05:03) (45 - 53)  BP: 117/69 (04-30-22 @ 05:03) (117/69 - 151/74)  BP(mean): --  RR: 18 (04-30-22 @ 05:03) (18 - 19)  SpO2: 99% (04-30-22 @ 05:03) (96% - 100%)    PHYSICAL EXAM:     GENERAL: NAD, lying in bed comfortably.  HEAD:  Atraumatic, normocephalic.   NECK: Limited ROM. Dried lotion covering rash.  CHEST/LUNG: CTAB. No rales, rhonchi, wheezing, or rubs. Unlabored respirations.  HEART: Bradycardic, no M/R/G, normal S1/S2.  ABDOMEN: Soft, nontender, nondistended, no organomegaly. Normoactive bowel sounds.  EXTREMITIES:  2+ peripheral pulses b/l. No clubbing, cyanosis, or edema.  NEURO:  AAOx3, no focal deficits.   PSYCH: Normal mood, affect.      LABS:                        11.6   6.55  )-----------( 265      ( 29 Apr 2022 07:32 )             35.5     04-29    141  |  107  |  15  ----------------------------<  111<H>  3.7   |  23  |  0.75    Ca    8.4      29 Apr 2022 07:32    TPro  7.1  /  Alb  4.3  /  TBili  0.2  /  DBili  x   /  AST  13  /  ALT  10  /  AlkPhos  55  04-28        PT/INR - ( 28 Apr 2022 21:11 )   PT: 11.5 sec;   INR: 1.00 ratio         PTT - ( 28 Apr 2022 21:11 )  PTT:32.0 sec    Creatinine Trend: 0.75<--, 0.80<--  I&O's Summary    BNP    RADIOLOGY & ADDITIONAL STUDIES:

## 2022-04-30 NOTE — PROGRESS NOTE ADULT - PROBLEM SELECTOR PLAN 1
Painful evolving rash x 3 weeks with new neck stiffness/pain, c/f disseminated herpes zoster infection and possible meningitis.  No risk factors such as HIV, autoimmune diseases, immunosuppression, or exposures.  Significant neck stiffness and pain with movement, c/f complication of herpes zoster meningitis vs. subclinical meningeal irritation.  LP glucose, protein, cell count not suggestive of viral meningitis.  Not septic.    - c/w empiric acyclovir 10mg/kg (70mg) q8h  - s/p NS 1L.  will start maintenance LR 100cc/hr to prevent acyclovir-induced nephrotoxicity.   - ID consulted, recs appreciated  - HIV negative  - f/u TFTs  - CSF VZV PCR neg  - f/u VZV PCR serum  - f/u CSF cultures  - f/u VZV IgG  - Pain control with Tylenol PO for mild pain, on oxy 5 for mod-severe pain Painful evolving rash x 3 weeks with new neck stiffness/pain, c/f disseminated herpes zoster infection and possible meningitis.  No risk factors such as HIV, autoimmune diseases, immunosuppression, or exposures.  Significant neck stiffness and pain with movement, c/f complication of herpes zoster meningitis vs. subclinical meningeal irritation.  LP glucose, protein, cell count not suggestive of viral meningitis.  Not septic.    - ID consulted, recs appreciated  - d/c'ed empiric acyclovir, precautions as likely not shingles  - s/p NS 1L.    - HIV negative  - f/u TFTs  - CSF VZV PCR neg  - f/u VZV PCR serum  - CSF cultures NGTD  - VZV IgG+  - Pain control with Tylenol for mild-mod pain, oxy 5 for severe pain Painful evolving rash x 3 weeks with new neck stiffness/pain, c/f disseminated herpes zoster infection and possible meningitis.  No risk factors such as HIV, autoimmune diseases, immunosuppression, or exposures.  Significant neck stiffness and pain with movement, c/f complication of herpes zoster meningitis vs. subclinical meningeal irritation.  LP glucose, protein, cell count not suggestive of viral meningitis.  Not septic.    - ID consulted, recs appreciated  - Derm consult  - d/c'ed empiric acyclovir, precautions as likely not shingles  - s/p NS 1L.    - HIV negative  - f/u TFTs  - CSF VZV PCR neg  - f/u VZV PCR serum  - CSF cultures NGTD  - VZV IgG+  - Pain control with Tylenol for mild-mod pain, oxy 5 for severe pain

## 2022-04-30 NOTE — PROGRESS NOTE ADULT - ATTENDING COMMENTS
Tess Guardado MD  Division of Hospital Medicine  [facility]   Available on [employer] Teams - messages preferred prior to calls.    Patient seen and examined today with Team 3 Interns. Agree with above findings, assessment, and plan with the following additions/exceptions:    Overall, 40 yo female with PMH of hypothyroidism p/w subacute rash and new onset neck stiffness with intitial concern for possible meningitis +/- disseminated zoster found to have like intertrigo with course c/b post-LP headache.    Active Issues:  #Rash/Intertrigo  #Headache  #Low TSH    Plan:  - s/p LP not consistent with meninigitis. acyclovir discontinued  - ID and derm consult recs appreciated  - rash not typical for zoster. per derm, suspect intertrigo  - start ketoconazole and hydrocortisone BID as per derm recs  - headache improved with esgic, continue  - f/u varicella PCR, IgG+ IgM-  - TSH low. states recently was increased to levothyroxine 137mcg daily as outpatient. f/u free T4  - if evidence of oversuppression on T4, would continue on 125mcg dose which was her prior dose prior to recent increase    DC home once headache and n/v improved.    Rest as detailed in note above.    Plan discussed with patient and team 3 intern Dr. Monsivais. Tess Guardado MD  Division of Hospital Medicine  [facility]   Available on Microsoft Teams - messages preferred prior to calls.    Patient seen and examined today with Team 3 Interns. Agree with above findings, assessment, and plan with the following additions/exceptions:    Overall, 40 yo female with PMH of hypothyroidism p/w subacute rash and new onset neck stiffness with intitial concern for possible meningitis +/- disseminated zoster found to have like intertrigo with course c/b post-LP headache.    Active Issues:  #Rash/Intertrigo  #R/O Meningitis  #Headache  #Low TSH    Plan:  - s/p LP not consistent with meninigitis. acyclovir discontinued  - ID and derm consult recs appreciated  - rash not typical for zoster. per derm, suspect intertrigo  - start ketoconazole and hydrocortisone BID as per derm recs  - headache improved with esgic, continue  - f/u varicella PCR, IgG+ IgM-  - TSH low. states recently was increased to levothyroxine 137mcg daily as outpatient. f/u free T4  - if evidence of oversuppression on T4, would continue on 125mcg dose which was her prior dose prior to recent increase    DC home once headache and n/v improved.    Rest as detailed in note above.    Plan discussed with patient and team 3 intern Dr. Monsivais.

## 2022-04-30 NOTE — PROGRESS NOTE ADULT - PROBLEM SELECTOR PLAN 2
- Brudzinski sign negative  - Plan as above - Brudzinski sign negative  - now with severe HA, 10/10, worse with sitting/standing  - given Esgic with mild relief of sxs  - Esgic q6 PRN  - ID recs appreciated - Brudzinski sign negative  - now with severe HA, 10/10, worse with sitting/standing  - given Esgic with mild relief of sxs  - Esgic q6 PRN. Will also trial fluids for possible postdural HA  - ID recs appreciated

## 2022-04-30 NOTE — CHART NOTE - NSCHARTNOTEFT_GEN_A_CORE
HPI: 41F with hypothyroidism presents with painful evolving rash x 3 weeks with new neck stiffness/pain.    DERM:  3 weeks ago, patient rash on the back of the neck.  She went to urgent care and was prescribed valacyclovir PO 1g TID as it was thought to be zoster, however rash spread to the bilaterally to the right and left sides of neck.  She saw PCP last week and was continued on Valtrex, completed about ~3 week course. This past week she started to develop a similar rash undersides of bilateral breasts and left inguinal region.    In ED, patient had LP performed – CSF PCR neg and culture prelim neg, and patient given Tylenol, ketorolac, and oxycodone. Patient started on acyclovir, but seen by ID, recommended stopping acyclovir.     OBJECTIVE:   Afebrile VSS   WBC: 6.55 (04-29)  WBC: 8.37 // Eos: 0.42 (5.0%) // Neut: 3.42 (40.9<L>%) // Lymph: 3.91<H> (46.7<H>%) // Atypical Lymphs (--% or --%) (04-28)    Hb: 11.6 // PLT: 265 (04-29)  Hb: 12.7 // PLT: 322 (04-28)    BUN: 15 // Cr: 0.75 (04-29)  BUN: 16 // Cr: 0.80 (04-28)    AST: 13 // ALT: 10 // ALP: 55 (04-28)    MICRO   CSF gram stain neg, culture prelim neg  CSF PCR neg  CSF – 106 RBC, WBC <1, glucose 66 mg/dL, [id number] mg/dL      Varicella Ab positive   HIV neg     EXAM (per photos): crusted papules and thin erythematous plaques of the inframammary folds, neck, and inguinal folds     A&P – Favor intertrigo   - Ketoconazole 2% cream BID to affected area   - Hydrocortisone 2.5% ointment BID to affected area for maximum of 2 weeks     The patient's case and photos were reviewed remotely with the dermatology attending Dr. Lynne. Recommendations were communicated with the primary team.  Please page [phone] w/10 digit call back number for further related questions.    Chayo Key MD  Resident Physician, PGY2  [facility]ermat[facility]  Pager: [phone]  Office: [phone]

## 2022-05-01 DIAGNOSIS — G97.1 OTHER REACTION TO SPINAL AND LUMBAR PUNCTURE: ICD-10-CM

## 2022-05-01 LAB
ALBUMIN SERPL ELPH-MCNC: 3.9 G/DL — SIGNIFICANT CHANGE UP (ref 3.3–5)
ALP SERPL-CCNC: 50 U/L — SIGNIFICANT CHANGE UP (ref 40–120)
ALT FLD-CCNC: 8 U/L — LOW (ref 10–45)
ANION GAP SERPL CALC-SCNC: 14 MMOL/L — SIGNIFICANT CHANGE UP (ref 5–17)
AST SERPL-CCNC: 10 U/L — SIGNIFICANT CHANGE UP (ref 10–40)
BILIRUB SERPL-MCNC: 0.6 MG/DL — SIGNIFICANT CHANGE UP (ref 0.2–1.2)
BUN SERPL-MCNC: 12 MG/DL — SIGNIFICANT CHANGE UP (ref 7–23)
CALCIUM SERPL-MCNC: 9.3 MG/DL — SIGNIFICANT CHANGE UP (ref 8.4–10.5)
CHLORIDE SERPL-SCNC: 107 MMOL/L — SIGNIFICANT CHANGE UP (ref 96–108)
CO2 SERPL-SCNC: 20 MMOL/L — LOW (ref 22–31)
CREAT SERPL-MCNC: 0.66 MG/DL — SIGNIFICANT CHANGE UP (ref 0.5–1.3)
CULTURE RESULTS: NO GROWTH — SIGNIFICANT CHANGE UP
EGFR: 112 ML/MIN/1.73M2 — SIGNIFICANT CHANGE UP
GLUCOSE SERPL-MCNC: 76 MG/DL — SIGNIFICANT CHANGE UP (ref 70–99)
HCT VFR BLD CALC: 38.5 % — SIGNIFICANT CHANGE UP (ref 34.5–45)
HGB BLD-MCNC: 12.8 G/DL — SIGNIFICANT CHANGE UP (ref 11.5–15.5)
MAGNESIUM SERPL-MCNC: 2 MG/DL — SIGNIFICANT CHANGE UP (ref 1.6–2.6)
MCHC RBC-ENTMCNC: 32 PG — SIGNIFICANT CHANGE UP (ref 27–34)
MCHC RBC-ENTMCNC: 33.2 GM/DL — SIGNIFICANT CHANGE UP (ref 32–36)
MCV RBC AUTO: 96.3 FL — SIGNIFICANT CHANGE UP (ref 80–100)
NRBC # BLD: 0 /100 WBCS — SIGNIFICANT CHANGE UP (ref 0–0)
PHOSPHATE SERPL-MCNC: 2.6 MG/DL — SIGNIFICANT CHANGE UP (ref 2.5–4.5)
PLATELET # BLD AUTO: 320 K/UL — SIGNIFICANT CHANGE UP (ref 150–400)
POTASSIUM SERPL-MCNC: 4.1 MMOL/L — SIGNIFICANT CHANGE UP (ref 3.5–5.3)
POTASSIUM SERPL-SCNC: 4.1 MMOL/L — SIGNIFICANT CHANGE UP (ref 3.5–5.3)
PROT SERPL-MCNC: 6.8 G/DL — SIGNIFICANT CHANGE UP (ref 6–8.3)
RBC # BLD: 4 M/UL — SIGNIFICANT CHANGE UP (ref 3.8–5.2)
RBC # FLD: 13.1 % — SIGNIFICANT CHANGE UP (ref 10.3–14.5)
SODIUM SERPL-SCNC: 141 MMOL/L — SIGNIFICANT CHANGE UP (ref 135–145)
SPECIMEN SOURCE: SIGNIFICANT CHANGE UP
T4 FREE SERPL-MCNC: 1.6 NG/DL — SIGNIFICANT CHANGE UP (ref 0.9–1.8)
WBC # BLD: 8.23 K/UL — SIGNIFICANT CHANGE UP (ref 3.8–10.5)
WBC # FLD AUTO: 8.23 K/UL — SIGNIFICANT CHANGE UP (ref 3.8–10.5)

## 2022-05-01 PROCEDURE — 99233 SBSQ HOSP IP/OBS HIGH 50: CPT | Mod: GC

## 2022-05-01 RX ORDER — LANOLIN ALCOHOL/MO/W.PET/CERES
5 CREAM (GRAM) TOPICAL AT BEDTIME
Refills: 0 | Status: DISCONTINUED | OUTPATIENT
Start: 2022-05-01 | End: 2022-05-03

## 2022-05-01 RX ORDER — ONDANSETRON 8 MG/1
4 TABLET, FILM COATED ORAL ONCE
Refills: 0 | Status: COMPLETED | OUTPATIENT
Start: 2022-05-01 | End: 2022-05-01

## 2022-05-01 RX ORDER — DIPHENHYDRAMINE HCL 50 MG
25 CAPSULE ORAL ONCE
Refills: 0 | Status: COMPLETED | OUTPATIENT
Start: 2022-05-01 | End: 2022-05-01

## 2022-05-01 RX ORDER — ONDANSETRON 8 MG/1
4 TABLET, FILM COATED ORAL EVERY 8 HOURS
Refills: 0 | Status: DISCONTINUED | OUTPATIENT
Start: 2022-05-01 | End: 2022-05-03

## 2022-05-01 RX ORDER — MAGNESIUM SULFATE 500 MG/ML
1 VIAL (ML) INJECTION ONCE
Refills: 0 | Status: COMPLETED | OUTPATIENT
Start: 2022-05-01 | End: 2022-05-01

## 2022-05-01 RX ORDER — SODIUM CHLORIDE 9 MG/ML
500 INJECTION, SOLUTION INTRAVENOUS ONCE
Refills: 0 | Status: COMPLETED | OUTPATIENT
Start: 2022-05-01 | End: 2022-05-01

## 2022-05-01 RX ORDER — SODIUM CHLORIDE 9 MG/ML
500 INJECTION INTRAMUSCULAR; INTRAVENOUS; SUBCUTANEOUS ONCE
Refills: 0 | Status: COMPLETED | OUTPATIENT
Start: 2022-05-01 | End: 2022-05-01

## 2022-05-01 RX ORDER — DEXAMETHASONE 0.5 MG/5ML
4 ELIXIR ORAL EVERY 6 HOURS
Refills: 0 | Status: DISCONTINUED | OUTPATIENT
Start: 2022-05-01 | End: 2022-05-03

## 2022-05-01 RX ORDER — SODIUM CHLORIDE 9 MG/ML
1000 INJECTION INTRAMUSCULAR; INTRAVENOUS; SUBCUTANEOUS
Refills: 0 | Status: DISCONTINUED | OUTPATIENT
Start: 2022-05-01 | End: 2022-05-03

## 2022-05-01 RX ADMIN — Medication 4 MILLIGRAM(S): at 13:13

## 2022-05-01 RX ADMIN — SODIUM CHLORIDE 100 MILLILITER(S): 9 INJECTION INTRAMUSCULAR; INTRAVENOUS; SUBCUTANEOUS at 23:15

## 2022-05-01 RX ADMIN — Medication 1 TABLET(S): at 09:45

## 2022-05-01 RX ADMIN — CALAMINE AND ZINC OXIDE AND PHENOL 1 APPLICATION(S): 160; 10 LOTION TOPICAL at 05:15

## 2022-05-01 RX ADMIN — Medication 1 TABLET(S): at 01:24

## 2022-05-01 RX ADMIN — Medication 1 TABLET(S): at 23:48

## 2022-05-01 RX ADMIN — Medication 4 MILLIGRAM(S): at 23:15

## 2022-05-01 RX ADMIN — Medication 1 APPLICATION(S): at 17:29

## 2022-05-01 RX ADMIN — Medication 25 MILLIGRAM(S): at 23:15

## 2022-05-01 RX ADMIN — Medication 1 TABLET(S): at 02:09

## 2022-05-01 RX ADMIN — SODIUM CHLORIDE 100 MILLILITER(S): 9 INJECTION INTRAMUSCULAR; INTRAVENOUS; SUBCUTANEOUS at 13:12

## 2022-05-01 RX ADMIN — Medication 1 TABLET(S): at 23:18

## 2022-05-01 RX ADMIN — SODIUM CHLORIDE 500 MILLILITER(S): 9 INJECTION, SOLUTION INTRAVENOUS at 08:06

## 2022-05-01 RX ADMIN — Medication 25 GRAM(S): at 09:18

## 2022-05-01 RX ADMIN — CALAMINE AND ZINC OXIDE AND PHENOL 1 APPLICATION(S): 160; 10 LOTION TOPICAL at 23:15

## 2022-05-01 RX ADMIN — Medication 125 MICROGRAM(S): at 05:14

## 2022-05-01 RX ADMIN — Medication 1 APPLICATION(S): at 05:16

## 2022-05-01 RX ADMIN — Medication 15 MILLIGRAM(S): at 08:16

## 2022-05-01 RX ADMIN — SODIUM CHLORIDE 500 MILLILITER(S): 9 INJECTION INTRAMUSCULAR; INTRAVENOUS; SUBCUTANEOUS at 18:35

## 2022-05-01 RX ADMIN — Medication 4 MILLIGRAM(S): at 17:29

## 2022-05-01 RX ADMIN — Medication 5 MILLIGRAM(S): at 16:05

## 2022-05-01 RX ADMIN — Medication 1 TABLET(S): at 09:16

## 2022-05-01 RX ADMIN — CALAMINE AND ZINC OXIDE AND PHENOL 1 APPLICATION(S): 160; 10 LOTION TOPICAL at 13:13

## 2022-05-01 RX ADMIN — ONDANSETRON 4 MILLIGRAM(S): 8 TABLET, FILM COATED ORAL at 05:14

## 2022-05-01 RX ADMIN — ONDANSETRON 4 MILLIGRAM(S): 8 TABLET, FILM COATED ORAL at 08:21

## 2022-05-01 RX ADMIN — CALAMINE AND ZINC OXIDE AND PHENOL 1 APPLICATION(S): 160; 10 LOTION TOPICAL at 17:29

## 2022-05-01 RX ADMIN — Medication 15 MILLIGRAM(S): at 09:15

## 2022-05-01 RX ADMIN — Medication 1 TABLET(S): at 15:36

## 2022-05-01 RX ADMIN — Medication 1 TABLET(S): at 16:00

## 2022-05-01 NOTE — PROGRESS NOTE ADULT - PROBLEM SELECTOR PLAN 3
- c/w home synthroid 125mcg - currently on synthroid 125mcg. Per patient, dose was recently increased to 137mcg daily as outpatient  - f/u free T4 level,if oversuppression, c/w 125mcg dose - CSF results negative so far.   -Brudzinski sign negative  - now with severe HA, 10/10, worse with sitting/standing  - given Esgic with mild relief of sxs  - Esgic q6 PRN. Will also trial fluids for possible postdural HA  - ID recs appreciated

## 2022-05-01 NOTE — CONSULT NOTE ADULT - ATTENDING COMMENTS
Patient presenting with rash on both sides of her neck, under her bilateral breasts and at her left groin  Appearance and distribution is not typical for Zoster (even with disseminated infection)  Now s/p 3 weeks of Valtrex  LP with cell counts which are not elevated  Doubt disseminated Zoster and in any case patient has completed 3 weeks of Valtrex  Would stop IV acyclovir and discontinue isolation    ?Contact dermatitis ?Poison Ivy Exposure  Recommend dermatology consult as diagnosis is unclear    I will be away over this upcoming weekend. Please contact the Infectious Diseases Office with any further questions or concerns.     Christiano Campos M.D.  [facility] Division of Infectious Disease  8AM-5PM Monday - Friday: Available on [employer] Teams  After Hours and Holidays (or if no response on [employer] Teams): Please contact the Infectious Diseases Office at [phone]       The above assessment and plan were discussed with medicine team
case discussed with staff and residents.

## 2022-05-01 NOTE — CHART NOTE - NSCHARTNOTEFT_GEN_A_CORE
I was called for SBP 88 and pt complaining of dizziness. On my interview, pt notes only dizziness "feels like I am on a boat" but no worsening of her HA - she is on her side, blocking light with her pillow. She has been drinking coffee, but hasn't eaten/drank much - only 1 BM yesterday, hasn't urinated today.    On my exam, pt is A&Ox3, +photophobia, +pain with neck flexion, RRR, clear lung sounds, no rash, pulses radial/pt/dp intact. Repeat BP at bedside SBP 98.    Ordered 500cc NS bolus, +ice pack, told nurse to remind patient her meds are PRN so if she is in pain/nausea she must ask for it.    Will sign out to Team 3 - plan for blood patch tomorrow.

## 2022-05-01 NOTE — PROGRESS NOTE ADULT - PROBLEM SELECTOR PLAN 4
Diet: Regular  DVT PPx: Lovenox Diet: Regular  DVT PPx: low improve score - currently on synthroid 125mcg. Per patient, dose was recently increased to 137mcg daily as outpatient  - f/u free T4 level,if oversuppression, c/w 125mcg dose

## 2022-05-01 NOTE — PROGRESS NOTE ADULT - PROBLEM SELECTOR PLAN 2
- CSF results negative so far.   -Brudzinski sign negative  - now with severe HA, 10/10, worse with sitting/standing  - given Esgic with mild relief of sxs  - Esgic q6 PRN. Will also trial fluids for possible postdural HA  - ID recs appreciated Painful evolving rash x 3 weeks with new neck stiffness/pain, c/f disseminated herpes zoster infection and possible meningitis.  No risk factors such as HIV, autoimmune diseases, immunosuppression, or exposures.  Significant neck stiffness and pain with movement, c/f complication of herpes zoster meningitis vs. subclinical meningeal irritation.  LP glucose, protein, cell count not suggestive of viral meningitis.  Not septic.    - ID consulted, recs appreciated  - Derm consult  - d/c'ed empiric acyclovir, precautions as likely not shingles  - s/p NS 1L.    - HIV negative  - f/u TFTs  - CSF VZV PCR neg  - f/u VZV PCR serum  - CSF cultures NGTD  - VZV IgG+  - Pain control with Tylenol for mild-mod pain, oxy 5 for severe pain  Per derm recs, likely intertrigo: Ketoconazole 2% cream BID to affected area and Hydrocortisone 2.5% ointment BID to affected area for maximum of 2 weeks

## 2022-05-01 NOTE — PROGRESS NOTE ADULT - SUBJECTIVE AND OBJECTIVE BOX
PROGRESS NOTE:   Authored by Petty Pate MD  Pager: [facility] [phone]; [facility] [phone]    Patient is a 41y old  Female who presents with a chief complaint of rash and neck pain (30 Apr 2022 07:29)      SUBJECTIVE / OVERNIGHT EVENTS:  Required esgic x1 overnight for headache. Also required zofran for nausea. This AM, denies CP, SOB, subjective f/c.  All other review of systems is negative unless indicated above.    MEDICATIONS  (STANDING):  calamine/zinc oxide Lotion 1 Application(s) Topical four times a day  clotrimazole 1% Cream 1 Application(s) Topical two times a day  hydrocortisone 2.5% Lotion 1 Application(s) Topical two times a day  levothyroxine 125 MICROGram(s) Oral daily    MEDICATIONS  (PRN):  acetaminophen     Tablet .. 650 milliGRAM(s) Oral every 6 hours PRN Temp greater or equal to 38C (100.4F), Mild Pain (1 - 3)  acetaminophen 325 mG/butalbital 50 mG/caffeine 40 mG 1 Tablet(s) Oral every 6 hours PRN headache  ketorolac   Injectable 15 milliGRAM(s) IV Push every 8 hours PRN Severe Pain (7 - 10)  oxyCODONE    IR 5 milliGRAM(s) Oral every 4 hours PRN Severe Pain (7 - 10)      CAPILLARY BLOOD GLUCOSE        I&O's Summary    30 Apr 2022 07:01  -  01 May 2022 06:04  --------------------------------------------------------  IN: 1575 mL / OUT: 0 mL / NET: 1575 mL        PHYSICAL EXAM:  Vital Signs Last 24 Hrs  T(C): 36.5 (01 May 2022 05:12), Max: 36.8 (30 Apr 2022 21:13)  T(F): 97.7 (01 May 2022 05:12), Max: 98.2 (30 Apr 2022 21:13)  HR: 58 (01 May 2022 05:12) (57 - 58)  BP: 120/68 (01 May 2022 05:12) (120/68 - 130/82)  BP(mean): --  RR: 18 (01 May 2022 05:12) (18 - 18)  SpO2: 97% (01 May 2022 05:12) (97% - 99%)    PHYSICAL EXAM:     GENERAL: NAD, lying in bed comfortably.  HEAD:  Atraumatic, normocephalic.   NECK: Limited ROM. Dried lotion covering rash.  CHEST/LUNG: CTAB. No rales, rhonchi, wheezing, or rubs. Unlabored respirations.  HEART: Bradycardic, no M/R/G, normal S1/S2.  ABDOMEN: Soft, nontender, nondistended, no organomegaly. Normoactive bowel sounds.  EXTREMITIES:  2+ peripheral pulses b/l. No clubbing, cyanosis, or edema.  NEURO:  AAOx3, no focal deficits.   PSYCH: Normal mood, affect.    LABS:                        12.8   9.18  )-----------( 320      ( 30 Apr 2022 10:52 )             38.2     04-30    144  |  109<H>  |  7   ----------------------------<  86  3.8   |  20<L>  |  0.53    Ca    9.3      30 Apr 2022 15:11  Phos  3.2     04-30  Mg     1.7     04-30                Culture - CSF with Gram Stain (collected 29 Apr 2022 00:33)  Source: .CSF CSF  Gram Stain (29 Apr 2022 01:33):    No polymorphonuclear cells seen    No organisms seen    by cytocentrifuge  Preliminary Report (29 Apr 2022 16:04):    No growth        RADIOLOGY & ADDITIONAL TESTS:  Results Reviewed:   Imaging Personally Reviewed:  Electrocardiogram Personally Reviewed:    COORDINATION OF CARE:  Care Discussed with Consultants/Other Providers [Y/N]:  Prior or Outpatient Records Reviewed [Y/N]:   PROGRESS NOTE:   Authored by Petty Pate MD  Pager: [facility] [phone]; [facility] [phone]    Patient is a 41y old  Female who presents with a chief complaint of rash and neck pain (30 Apr 2022 07:29)      SUBJECTIVE / OVERNIGHT EVENTS:  Required esgic x1 overnight for headache. Also required zofran for nausea. States HA still exacerbated with sitting up and that she has mainly been laying down. Also with decreased PO intake due to nausea. This AM, denies CP, SOB, subjective f/c.  All other review of systems is negative unless indicated above.    MEDICATIONS  (STANDING):  calamine/zinc oxide Lotion 1 Application(s) Topical four times a day  clotrimazole 1% Cream 1 Application(s) Topical two times a day  hydrocortisone 2.5% Lotion 1 Application(s) Topical two times a day  levothyroxine 125 MICROGram(s) Oral daily    MEDICATIONS  (PRN):  acetaminophen     Tablet .. 650 milliGRAM(s) Oral every 6 hours PRN Temp greater or equal to 38C (100.4F), Mild Pain (1 - 3)  acetaminophen 325 mG/butalbital 50 mG/caffeine 40 mG 1 Tablet(s) Oral every 6 hours PRN headache  ketorolac   Injectable 15 milliGRAM(s) IV Push every 8 hours PRN Severe Pain (7 - 10)  oxyCODONE    IR 5 milliGRAM(s) Oral every 4 hours PRN Severe Pain (7 - 10)      CAPILLARY BLOOD GLUCOSE        I&O's Summary    30 Apr 2022 07:01  -  01 May 2022 06:04  --------------------------------------------------------  IN: 1575 mL / OUT: 0 mL / NET: 1575 mL        PHYSICAL EXAM:  Vital Signs Last 24 Hrs  T(C): 36.5 (01 May 2022 05:12), Max: 36.8 (30 Apr 2022 21:13)  T(F): 97.7 (01 May 2022 05:12), Max: 98.2 (30 Apr 2022 21:13)  HR: 58 (01 May 2022 05:12) (57 - 58)  BP: 120/68 (01 May 2022 05:12) (120/68 - 130/82)  BP(mean): --  RR: 18 (01 May 2022 05:12) (18 - 18)  SpO2: 97% (01 May 2022 05:12) (97% - 99%)    PHYSICAL EXAM:     GENERAL: NAD, lying in bed comfortably.  HEAD:  Atraumatic, normocephalic.   NECK: Limited ROM. Dried lotion covering rash.  CHEST/LUNG: CTAB. No rales, rhonchi, wheezing, or rubs. Unlabored respirations.  HEART: Bradycardic, no M/R/G, normal S1/S2.  ABDOMEN: Soft, nontender, nondistended, no organomegaly. Normoactive bowel sounds.  EXTREMITIES:  2+ peripheral pulses b/l. No clubbing, cyanosis, or edema.  NEURO:  AAOx3, no focal deficits.   PSYCH: Normal mood, affect.    LABS:                        12.8   9.18  )-----------( 320      ( 30 Apr 2022 10:52 )             38.2     04-30    144  |  109<H>  |  7   ----------------------------<  86  3.8   |  20<L>  |  0.53    Ca    9.3      30 Apr 2022 15:11  Phos  3.2     04-30  Mg     1.7     04-30                Culture - CSF with Gram Stain (collected 29 Apr 2022 00:33)  Source: .CSF CSF  Gram Stain (29 Apr 2022 01:33):    No polymorphonuclear cells seen    No organisms seen    by cytocentrifuge  Preliminary Report (29 Apr 2022 16:04):    No growth        RADIOLOGY & ADDITIONAL TESTS:  Results Reviewed:   Imaging Personally Reviewed:  Electrocardiogram Personally Reviewed:    COORDINATION OF CARE:  Care Discussed with Consultants/Other Providers [Y/N]:  Prior or Outpatient Records Reviewed [Y/N]:

## 2022-05-01 NOTE — PROGRESS NOTE ADULT - PROBLEM SELECTOR PLAN 1
Painful evolving rash x 3 weeks with new neck stiffness/pain, c/f disseminated herpes zoster infection and possible meningitis.  No risk factors such as HIV, autoimmune diseases, immunosuppression, or exposures.  Significant neck stiffness and pain with movement, c/f complication of herpes zoster meningitis vs. subclinical meningeal irritation.  LP glucose, protein, cell count not suggestive of viral meningitis.  Not septic.    - ID consulted, recs appreciated  - Derm consult  - d/c'ed empiric acyclovir, precautions as likely not shingles  - s/p NS 1L.    - HIV negative  - f/u TFTs  - CSF VZV PCR neg  - f/u VZV PCR serum  - CSF cultures NGTD  - VZV IgG+  - Pain control with Tylenol for mild-mod pain, oxy 5 for severe pain  Per derm recs, likely intertrigo: Ketoconazole 2% cream BID to affected area and Hydrocortisone 2.5% ointment BID to affected area for maximum of 2 weeks Persistent headache likely post-LP. Postural, relieved with laying flat.    -encourage PO caffeine intake  - continuous IVF  - Esgic q6h  - Lay flat as much as possible.   - Decadron 4mg q6h (started 5/1-)  - Called anesthesia 5/1 for possible blood patch, told to call neuro first and recommended supportive measures. Will call again in AM 5/2 if headache still not resolved for blood patch procedure  - Appreciate neuro recs Persistent headache likely post-LP. Postural, relieved with laying flat.    -encourage PO caffeine intake  - continuous IVF  - Esgic q6h  - Lay flat as much as possible.   - Decadron 4mg q6h (started 5/1-)  - Called anesthesia 5/1 for possible blood patch, told to call neuro first and recommended supportive measures. Told that neuroradiology does blood patch, and not done on the weekend. Will call neuroradiology in AM on 5/2 if headache still persisting.   - Appreciate neuro recs Persistent headache likely post-LP. Postural, relieved with laying flat.    -encourage PO caffeine intake  - continuous IVF  - Esgic q6h  - Lay flat as much as possible.   - Decadron 4mg q6h (started 5/1-)  - Called anesthesia 5/1 for possible blood patch, told to call neuro first and recommended supportive measures. Told that neuroradiology does blood patch, and not done on the weekend. Will call neuroradiology in AM on 5/2 if headache still persisting.   - Appreciate neuro recs: recommending decadron, IVF, and epidural blood patch. Epidural blood patch not done over the weekend per neuroradiology; told to call in AM 5/2.

## 2022-05-01 NOTE — CONSULT NOTE ADULT - ASSESSMENT
Ms Rolon is 41 year old lady with PMH of hypothyroidism who presents with painful evolving rash x 3 weeks with new neck stiffness/pain. 3 weeks ago, developed red painful welts like rash on the back of the neck.  She went to urgent care and was prescribed valacyclovir PO 1g TID, however rash spread to the bilaterally to the right and left sides of neck and this week to bilateral breasts and left inguinal region.  She came to the ED today because she developed neck stiffness and pain in neck with movement, mild frontal headache and generalized weakness. No numbness, tingling, weakness, no nausea, vomiting, no changes in vision, hearing, no face or eye pain. She also notes fevers/chills at home.  No hx of autoimmune disease - RA, IBD, SLE. Developed hypothyroidism after pregnancy.  No hx of STI's, HIV.  No known exposure to persons with shingles, chickenpox.      WORKUP  CSF (4/28): CSF PCR negative , WBC <1, glucose 66 mg/dL, [place]92 mg/dL     HIV-1/2 Combo Result: Nonreact (04-29    IMPRESSION  Post spinal headache with CSF hypotension syndrome.    RECOMMENDATIONS  Afebrile and negative CSF workup so far  IVF fluids with caffeine  IV decadron 4 mg Q6 iv  Epidural blood patch ASAP

## 2022-05-01 NOTE — PROGRESS NOTE ADULT - ATTENDING COMMENTS
Patient seen and examined. Agree with above findings, assessment, and plan with the following additions/exceptions:    Overall, 41F with PMH of hypothyroidism p/w subacute rash and new onset neck stiffness with initial concern for possible meningitis +/- disseminated zoster found to have like intertrigo with course c/b post-LP headache.    Active Issues:  #Rash/Intertrigo  #R/O Meningitis  #Headache  #Low TSH    Plan:  - s/p LP ; not consistent with meninigitis. acyclovir discontinued  - ID and derm consult recs appreciated  - rash not typical for zoster. per derm, suspect intertrigo  - started on ketoconazole and hydrocortisone BID as per derm recs  - still c/o severe headache ; suspect post- LP headache ; obtain Anesthesiology consult for possible blood patch  - c/w Esgic  - varicella PCR negative    - TSH low. states recently was increased to levothyroxine 137mcg daily as outpatient. f/u free T4  - if evidence of oversuppression on T4, would continue on 125mcg dose which was her prior dose prior to recent increase      Rest as detailed in note above.

## 2022-05-01 NOTE — CONSULT NOTE ADULT - SUBJECTIVE AND OBJECTIVE BOX
Patient is a 41y old  Female who presents with a chief complaint of rash and neck pain with acute Post LP headaches    HPI: Ms Rolon is 41 year old lady with PMH of hypothyroidism who presents with painful evolving rash x 3 weeks with new neck stiffness/pain x 1 day. 3 weeks ago, developed red painful welts like rash on the back of the neck.  She went to urgent care and was prescribed valacyclovir PO 1g TID, however rash spread to the bilaterally to the right and left sides of neck and this week to bilateral breasts and left inguinal region.  She came to the ED today because she developed neck stiffness and pain in neck with movement, mild frontal headache and generalized weakness. No numbness, tingling, weakness, no nausea, vomiting, no changes in vision, hearing, no face or eye pain. She also notes subjective fevers/chills at home.  No hx of autoimmune disease - RA, IBD, SLE. Developed hypothyroidism after pregnancy.  No hx of STI's, HIV.  No known exposure to persons with shingles, She had chickenpox as a child. Afebrile with no leukocytosis. Pt had LP for neck pain with negative CSF workup so far. Pt reports post LP headache, Photophobia and nausea. No new rashs since last 4 days. Rashes have crusted over. however very painful (burning with stabbing pain). Currently has headache, nausea and photophobia which started after LP.     REVIEW OF SYSTEMS  [  ] ROS unobtainable because:    [ x ] All other systems negative except as noted below    Constitutional:  [ ] fever [ ] chills  [ ] weight loss  [ ]night sweat  [ ]poor appetite/PO intake [ ]fatigue   Skin:  [ ] rash [ ] phlebitis	  Eyes: [ ] icterus [ ] pain  [ ] discharge	  ENMT: [ ] sore throat  [ ] thrush [ ] ulcers [ ] exudates [ ]anosmia  Respiratory: [ ] dyspnea [ ] hemoptysis [ ] cough [ ] sputum	  Cardiovascular:  [ ] chest pain [ ] palpitations [ ] edema	  Gastrointestinal:  [ ] nausea [ ] vomiting [ ] diarrhea [ ] constipation [ ] pain	  Genitourinary:  [ ] dysuria [ ] frequency [ ] hematuria [ ] discharge [ ] flank pain  [ ] incontinence  Musculoskeletal:  [ ] myalgias [ ] arthralgias [ ] arthritis  [ ] back pain  Neurological:  [ ] headache [ ] weakness [ ] seizures  [ ] confusion/altered mental status    prior hospital charts reviewed [V]  primary team notes reviewed [V]  other consultant notes reviewed [V]    PAST MEDICAL & SURGICAL HISTORY:  Asthma  Hypothyroid  Asthma  Atrophy of kidney  Hypertension  H/O breast augmentation  History of   H/O:     SOCIAL HISTORY:  - Denied smoking/vaping/recreational drug use  - Has a dog. No other animal exposure  - Last month went to [place] with daughter (Traveled within city). No outdoor exposure  - Occasional alcohol  - NO sick contacts    FAMILY HISTORY:  FH: CAD (coronary artery disease) (Mother)    Allergies  No Known Allergies    ANTIMICROBIALS:  acyclovir IVPB 700 every 8 hours  acyclovir IVPB        ANTIMICROBIALS (past 90 days):  MEDICATIONS  (STANDING):  acyclovir IVPB   264 mL/Hr IV Intermittent (22 @ 00:17)    acyclovir IVPB   264 mL/Hr IV Intermittent (22 @ 06:05)    OTHER MEDS:   MEDICATIONS  (STANDING):  acetaminophen     Tablet .. 650 every 6 hours PRN  ketorolac   Injectable 15 every 8 hours PRN  levothyroxine 125 daily  oxyCODONE    IR 5 every 4 hours PRN      VITALS:  Vital Signs Last 24 Hrs  T(F): 97.5 (22 @ 12:47), Max: 98.3 (22 @ 20:35)    Vital Signs Last 24 Hrs  HR: 50 (22 @ 12:47) (48 - 73)  BP: 126/79 (22 @ 12:47) (98/59 - 138/90)  RR: 18 (22 @ 12:47)  SpO2: 96% (22 @ 12:47) (96% - 99%)  Wt(kg): --    EXAM:    PHYSICAL EXAM:  General:  non-toxic, AOx3  HEAD/EYES: PERRL, white sclera   ENT:  normal, No thrush and no pharyngeal exudate  Neck: mild stiffness. Dried papular lesions with copious calamine lotion applied over it  Cardiovascular:   No murmur,  normal S1 and S2  Respiratory: clear to ausculation bilaterally  GI:  soft, non-tender, normal bowel sounds  : no powell, no CVA tenderness   Musculoskeletal:  no synovitis  Neurologic: awake alert in severe distress with positional headaches with nausea and photophobia. Unable to do detailed neuro exam.  Skin: Dried papular lesions with copious calamine lotion applied over it symmetric over B/L Neck, B/L intertriginous region below breast and left groin  Lymph:  no lymphadenopathy  Psychiatric: Cooperative, appropriate affect, alert & oriented  Lines:  no phlebitis           Labs:                        11.6   6.55  )-----------( 265      ( 2022 07:32 )             35.5         141  |  107  |  15  ----------------------------<  111<H>  3.7   |  23  |  0.75    Ca    8.4      2022 07:32    TPro  7.1  /  Alb  4.3  /  TBili  0.2  /  DBili  x   /  AST  13  /  ALT  10  /  AlkPhos  55        WBC Trend:  WBC Count: 6.55 (22 @ 07:32)  WBC Count: 8.37 (22 @ 21:11)      Auto Neutrophil #: 3.42 K/uL (22 @ 21:11)  Auto Neutrophil #: 6.03 K/uL (21 @ 15:41)      Creatine Trend:  Creatinine, Serum: 0.75 ()  Creatinine, Serum: 0.80 ()      Liver Biochemical Testing Trend:  Alanine Aminotransferase (ALT/SGPT): 10 ()  Aspartate Aminotransferase (AST/SGOT): 13 (22 @ 21:11)  Bilirubin Total, Serum: 0.2 ()  Trend LDH  Auto Eosinophil %: 5.0 % (22 @ 21:11)    MICROBIOLOGY:  Culture - CSF with Gram Stain (collected 2022 00:33)  Source: .CSF CSF    Culture - Urine (collected 04 Dec 2021 19:01)  Source: Clean Catch Clean Catch (Midstream)  Final Report:    <10,000 CFU/mL Normal Urogenital Jory    Culture - Urine (collected 2021 22:03)  Source: Clean Catch Clean Catch (Midstream)  Final Report:    <10,000 CFU/mL Normal Urogenital Jory      HIV-1/2 Combo Result: Nonreact (22 @ 08:50)    CSF PCR Result: NotDetec (22 @ 01:01)  CSF Appearance: Clear (22 @ 22:33)  Total Nucleated Cell Count, CSF: <1 (22 @ 22:33)  RBC Count - Spinal Fluid: 1 /uL (22:33)  CSF Segmented Neutrophils: See Note (22:33)  CSF Appearance: Clear (22 @ :32)  Total Nucleated Cell Count, CSF: <1 (22:32)  RBC Count - Spinal Fluid: 106 /uL (22 @ :32)  CSF Segmented Neutrophils: See Note (22 @ 22:32)  Glucose, CSF: 66 mg/dL (22 @ 22:32)  Protein, CSF: 20 mg/dL (22 @ 22:32)  Lactate Dehydrogenase, CSF: <15 U/L (22 @ 22:32)  Protein, CSF: 20 mg/dL (22 @ 22:32)        RADIOLOGY:

## 2022-05-02 LAB
ALBUMIN SERPL ELPH-MCNC: 3.8 G/DL — SIGNIFICANT CHANGE UP (ref 3.3–5)
ALP SERPL-CCNC: 47 U/L — SIGNIFICANT CHANGE UP (ref 40–120)
ALT FLD-CCNC: 7 U/L — LOW (ref 10–45)
ANION GAP SERPL CALC-SCNC: 12 MMOL/L — SIGNIFICANT CHANGE UP (ref 5–17)
AST SERPL-CCNC: 7 U/L — LOW (ref 10–40)
BASOPHILS # BLD AUTO: 0.02 K/UL — SIGNIFICANT CHANGE UP (ref 0–0.2)
BASOPHILS NFR BLD AUTO: 0.2 % — SIGNIFICANT CHANGE UP (ref 0–2)
BILIRUB SERPL-MCNC: 0.3 MG/DL — SIGNIFICANT CHANGE UP (ref 0.2–1.2)
BUN SERPL-MCNC: 10 MG/DL — SIGNIFICANT CHANGE UP (ref 7–23)
CALCIUM SERPL-MCNC: 8.7 MG/DL — SIGNIFICANT CHANGE UP (ref 8.4–10.5)
CHLORIDE SERPL-SCNC: 111 MMOL/L — HIGH (ref 96–108)
CO2 SERPL-SCNC: 19 MMOL/L — LOW (ref 22–31)
CREAT SERPL-MCNC: 0.54 MG/DL — SIGNIFICANT CHANGE UP (ref 0.5–1.3)
EGFR: 118 ML/MIN/1.73M2 — SIGNIFICANT CHANGE UP
EOSINOPHIL # BLD AUTO: 0 K/UL — SIGNIFICANT CHANGE UP (ref 0–0.5)
EOSINOPHIL NFR BLD AUTO: 0 % — SIGNIFICANT CHANGE UP (ref 0–6)
GLUCOSE SERPL-MCNC: 109 MG/DL — HIGH (ref 70–99)
HCT VFR BLD CALC: 38.3 % — SIGNIFICANT CHANGE UP (ref 34.5–45)
HGB BLD-MCNC: 12.6 G/DL — SIGNIFICANT CHANGE UP (ref 11.5–15.5)
IMM GRANULOCYTES NFR BLD AUTO: 0.6 % — SIGNIFICANT CHANGE UP (ref 0–1.5)
INR BLD: 1.12 RATIO — SIGNIFICANT CHANGE UP (ref 0.88–1.16)
LYMPHOCYTES # BLD AUTO: 1.65 K/UL — SIGNIFICANT CHANGE UP (ref 1–3.3)
LYMPHOCYTES # BLD AUTO: 20.2 % — SIGNIFICANT CHANGE UP (ref 13–44)
MAGNESIUM SERPL-MCNC: 1.8 MG/DL — SIGNIFICANT CHANGE UP (ref 1.6–2.6)
MCHC RBC-ENTMCNC: 32.1 PG — SIGNIFICANT CHANGE UP (ref 27–34)
MCHC RBC-ENTMCNC: 32.9 GM/DL — SIGNIFICANT CHANGE UP (ref 32–36)
MCV RBC AUTO: 97.7 FL — SIGNIFICANT CHANGE UP (ref 80–100)
MONOCYTES # BLD AUTO: 0.22 K/UL — SIGNIFICANT CHANGE UP (ref 0–0.9)
MONOCYTES NFR BLD AUTO: 2.7 % — SIGNIFICANT CHANGE UP (ref 2–14)
NEUTROPHILS # BLD AUTO: 6.23 K/UL — SIGNIFICANT CHANGE UP (ref 1.8–7.4)
NEUTROPHILS NFR BLD AUTO: 76.3 % — SIGNIFICANT CHANGE UP (ref 43–77)
NRBC # BLD: 0 /100 WBCS — SIGNIFICANT CHANGE UP (ref 0–0)
PHOSPHATE SERPL-MCNC: 3.2 MG/DL — SIGNIFICANT CHANGE UP (ref 2.5–4.5)
PLATELET # BLD AUTO: 297 K/UL — SIGNIFICANT CHANGE UP (ref 150–400)
POTASSIUM SERPL-MCNC: 4 MMOL/L — SIGNIFICANT CHANGE UP (ref 3.5–5.3)
POTASSIUM SERPL-SCNC: 4 MMOL/L — SIGNIFICANT CHANGE UP (ref 3.5–5.3)
PROT SERPL-MCNC: 6.7 G/DL — SIGNIFICANT CHANGE UP (ref 6–8.3)
PROTHROM AB SERPL-ACNC: 12.9 SEC — SIGNIFICANT CHANGE UP (ref 10.5–13.4)
RBC # BLD: 3.92 M/UL — SIGNIFICANT CHANGE UP (ref 3.8–5.2)
RBC # FLD: 13.2 % — SIGNIFICANT CHANGE UP (ref 10.3–14.5)
SODIUM SERPL-SCNC: 142 MMOL/L — SIGNIFICANT CHANGE UP (ref 135–145)
WBC # BLD: 8.17 K/UL — SIGNIFICANT CHANGE UP (ref 3.8–10.5)
WBC # FLD AUTO: 8.17 K/UL — SIGNIFICANT CHANGE UP (ref 3.8–10.5)

## 2022-05-02 PROCEDURE — 99233 SBSQ HOSP IP/OBS HIGH 50: CPT | Mod: GC

## 2022-05-02 RX ORDER — DIPHENHYDRAMINE HCL 50 MG
25 CAPSULE ORAL ONCE
Refills: 0 | Status: COMPLETED | OUTPATIENT
Start: 2022-05-02 | End: 2022-05-02

## 2022-05-02 RX ADMIN — Medication 1 APPLICATION(S): at 06:45

## 2022-05-02 RX ADMIN — Medication 125 MICROGRAM(S): at 05:25

## 2022-05-02 RX ADMIN — Medication 4 MILLIGRAM(S): at 23:20

## 2022-05-02 RX ADMIN — CALAMINE AND ZINC OXIDE AND PHENOL 1 APPLICATION(S): 160; 10 LOTION TOPICAL at 05:26

## 2022-05-02 RX ADMIN — Medication 15 MILLIGRAM(S): at 09:34

## 2022-05-02 RX ADMIN — Medication 1 APPLICATION(S): at 17:59

## 2022-05-02 RX ADMIN — CALAMINE AND ZINC OXIDE AND PHENOL 1 APPLICATION(S): 160; 10 LOTION TOPICAL at 23:21

## 2022-05-02 RX ADMIN — Medication 4 MILLIGRAM(S): at 17:59

## 2022-05-02 RX ADMIN — OXYCODONE HYDROCHLORIDE 5 MILLIGRAM(S): 5 TABLET ORAL at 12:18

## 2022-05-02 RX ADMIN — CALAMINE AND ZINC OXIDE AND PHENOL 1 APPLICATION(S): 160; 10 LOTION TOPICAL at 17:59

## 2022-05-02 RX ADMIN — Medication 1 TABLET(S): at 17:57

## 2022-05-02 RX ADMIN — ONDANSETRON 4 MILLIGRAM(S): 8 TABLET, FILM COATED ORAL at 12:17

## 2022-05-02 RX ADMIN — Medication 4 MILLIGRAM(S): at 05:25

## 2022-05-02 RX ADMIN — Medication 4 MILLIGRAM(S): at 12:18

## 2022-05-02 RX ADMIN — Medication 1 APPLICATION(S): at 17:58

## 2022-05-02 RX ADMIN — Medication 15 MILLIGRAM(S): at 10:00

## 2022-05-02 RX ADMIN — CALAMINE AND ZINC OXIDE AND PHENOL 1 APPLICATION(S): 160; 10 LOTION TOPICAL at 12:21

## 2022-05-02 RX ADMIN — Medication 1 TABLET(S): at 05:24

## 2022-05-02 RX ADMIN — Medication 5 MILLIGRAM(S): at 21:59

## 2022-05-02 RX ADMIN — OXYCODONE HYDROCHLORIDE 5 MILLIGRAM(S): 5 TABLET ORAL at 12:45

## 2022-05-02 NOTE — PROVIDER CONTACT NOTE (OTHER) - BACKGROUND
Patient admitted with rash and neck pain s/p blood patch
Pt admitted for disseminated herpes zoster.
Pt admitted with disseminated herpes zoster

## 2022-05-02 NOTE — PROGRESS NOTE ADULT - PROBLEM SELECTOR PLAN 2
Painful evolving rash x 3 weeks with new neck stiffness/pain, c/f disseminated herpes zoster infection and possible meningitis.  No risk factors such as HIV, autoimmune diseases, immunosuppression, or exposures.  Significant neck stiffness and pain with movement, c/f complication of herpes zoster meningitis vs. subclinical meningeal irritation.  LP glucose, protein, cell count not suggestive of viral meningitis.  Not septic.  d/c'ed empiric acyclovir, precautions as likely not shingles  s/p NS 1L.    HIV negative  CSF VZV PCR neg  - CSF cultures NGTD  - VZV IgG+    -Pain control with Tylenol for mild-mod pain, oxy 5 for severe pain  -derm recs appreciated: likely intertrigo: Ketoconazole 2% cream BID to affected area and Hydrocortisone 2.5% ointment BID to affected area for maximum of 2 weeks  -f/u ID recs and if patient needs to wait inpatient for results of VZV PCR  serum

## 2022-05-02 NOTE — PROGRESS NOTE ADULT - SUBJECTIVE AND OBJECTIVE BOX
PROGRESS NOTE:    Susan Ghotra MD  Internal Medicine PGY-1      Patient is a 41y old  Female who presents with a chief complaint of rash and neck pain (01 May 2022 13:30)      SUBJECTIVE / OVERNIGHT EVENTS: No acute overnight events. Pt seen and examined. Denies fevers, chills, CP, SOB, Abdominal pain, N/V, Constipation, Diarrhea      MEDICATIONS  (STANDING):  calamine/zinc oxide Lotion 1 Application(s) Topical four times a day  clotrimazole 1% Cream 1 Application(s) Topical two times a day  dexAMETHasone  Injectable 4 milliGRAM(s) IV Push every 6 hours  hydrocortisone 2.5% Lotion 1 Application(s) Topical two times a day  levothyroxine 125 MICROGram(s) Oral daily  melatonin 5 milliGRAM(s) Oral at bedtime  sodium chloride 0.9%. 1000 milliLiter(s) (100 mL/Hr) IV Continuous <Continuous>    MEDICATIONS  (PRN):  acetaminophen     Tablet .. 650 milliGRAM(s) Oral every 6 hours PRN Temp greater or equal to 38C (100.4F), Mild Pain (1 - 3)  acetaminophen 325 mG/butalbital 50 mG/caffeine 40 mG 1 Tablet(s) Oral every 6 hours PRN headache  ketorolac   Injectable 15 milliGRAM(s) IV Push every 8 hours PRN Severe Pain (7 - 10)  ondansetron Injectable 4 milliGRAM(s) IV Push every 8 hours PRN Nausea and/or Vomiting  oxyCODONE    IR 5 milliGRAM(s) Oral every 4 hours PRN Severe Pain (7 - 10)      I&O's Summary    01 May 2022 07:01  -  02 May 2022 07:00  --------------------------------------------------------  IN: 50 mL / OUT: 0 mL / NET: 50 mL        Vital Signs Last 24 Hrs  T(C): 36.6 (02 May 2022 05:16), Max: 36.9 (01 May 2022 17:36)  T(F): 97.9 (02 May 2022 05:16), Max: 98.4 (01 May 2022 17:36)  HR: 42 (02 May 2022 05:16) (42 - 55)  BP: 105/62 (02 May 2022 05:16) (88/53 - 112/60)  BP(mean): --  RR: 18 (02 May 2022 05:16) (16 - 18)  SpO2: 97% (02 May 2022 05:16) (94% - 97%)    =================PHYSICAL EXAM=================    PHYSICAL EXAM:  GENERAL: NAD, lying in bed comfortably  HEAD:  Atraumatic, Normocephalic  EYES: EOMI, PERRLA, conjunctiva and sclera clear  ENT: Moist mucous membranes  NECK: Supple, No JVD  CHEST/LUNG: Clear to auscultation bilaterally; No rales, rhonchi, wheezing, or rubs. Unlabored respirations  HEART: Regular rate and rhythm; No murmurs, rubs, or gallops  ABDOMEN: Bowel sounds present; Soft, Nontender, Nondistended. No hepatomegally  EXTREMITIES:  2+ Peripheral Pulses, brisk capillary refill. No clubbing, cyanosis, or edema  NERVOUS SYSTEM:  Alert & Oriented X3, speech clear. No deficits   MSK: FROM all 4 extremities, full and equal strength  SKIN: No rashes or lesions    =================================================    LABS:                        12.6   8.17  )-----------( 297      ( 02 May 2022 06:29 )             38.3     Auto Eosinophil # 0.00  / Auto Eosinophil % 0.0   / Auto Neutrophil # 6.23  / Auto Neutrophil % 76.3  / BANDS % x                            12.8   8.23  )-----------( 320      ( 01 May 2022 06:44 )             38.5     Auto Eosinophil # x     / Auto Eosinophil % x     / Auto Neutrophil # x     / Auto Neutrophil % x     / BANDS % x                            12.8   9.18  )-----------( 320      ( 30 Apr 2022 10:52 )             38.2     Auto Eosinophil # x     / Auto Eosinophil % x     / Auto Neutrophil # x     / Auto Neutrophil % x     / BANDS % x        05-01    141  |  107  |  12  ----------------------------<  76  4.1   |  20<L>  |  0.66  04-30    144  |  109<H>  |  7   ----------------------------<  86  3.8   |  20<L>  |  0.53    Ca    9.3      01 May 2022 06:45  Mg     2.0     05-01  Phos  2.6     05-01  TPro  6.8  /  Alb  3.9  /  TBili  0.6  /  DBili  x   /  AST  10  /  ALT  8<L>  /  AlkPhos  50  05-01                  RADIOLOGY & ADDITIONAL TESTS:    Imaging Personally Reviewed:    Consultant(s) Notes Reviewed:      Care Discussed with Consultants/Other Providers:   PROGRESS NOTE:    Susan Ghotra MD  Internal Medicine PGY-1      Patient is a 41y old  Female who presents with a chief complaint of rash and neck pain (01 May 2022 13:30)      SUBJECTIVE / OVERNIGHT EVENTS: No acute overnight events. Pt seen and examined. She endorses severe headache, photophobia, and nausea. She is unable to sit up. Denies fevers, chills, CP, SOB, Abdominal pain.       MEDICATIONS  (STANDING):  calamine/zinc oxide Lotion 1 Application(s) Topical four times a day  clotrimazole 1% Cream 1 Application(s) Topical two times a day  dexAMETHasone  Injectable 4 milliGRAM(s) IV Push every 6 hours  hydrocortisone 2.5% Lotion 1 Application(s) Topical two times a day  levothyroxine 125 MICROGram(s) Oral daily  melatonin 5 milliGRAM(s) Oral at bedtime  sodium chloride 0.9%. 1000 milliLiter(s) (100 mL/Hr) IV Continuous <Continuous>    MEDICATIONS  (PRN):  acetaminophen     Tablet .. 650 milliGRAM(s) Oral every 6 hours PRN Temp greater or equal to 38C (100.4F), Mild Pain (1 - 3)  acetaminophen 325 mG/butalbital 50 mG/caffeine 40 mG 1 Tablet(s) Oral every 6 hours PRN headache  ketorolac   Injectable 15 milliGRAM(s) IV Push every 8 hours PRN Severe Pain (7 - 10)  ondansetron Injectable 4 milliGRAM(s) IV Push every 8 hours PRN Nausea and/or Vomiting  oxyCODONE    IR 5 milliGRAM(s) Oral every 4 hours PRN Severe Pain (7 - 10)      I&O's Summary    01 May 2022 07:01  -  02 May 2022 07:00  --------------------------------------------------------  IN: 50 mL / OUT: 0 mL / NET: 50 mL        Vital Signs Last 24 Hrs  T(C): 36.6 (02 May 2022 05:16), Max: 36.9 (01 May 2022 17:36)  T(F): 97.9 (02 May 2022 05:16), Max: 98.4 (01 May 2022 17:36)  HR: 42 (02 May 2022 05:16) (42 - 55)  BP: 105/62 (02 May 2022 05:16) (88/53 - 112/60)  BP(mean): --  RR: 18 (02 May 2022 05:16) (16 - 18)  SpO2: 97% (02 May 2022 05:16) (94% - 97%)    =================PHYSICAL EXAM=================    GENERAL: Well groomed, well appearing, NAD, lying in flat in bed   EYES: EOMI, conjunctiva and sclera clear  ENT: Moist mucous membranes  NECK: Supple, limited ROM, lotion on back of neck   CHEST/LUNG: Clear to auscultation bilaterally; No rales, rhonchi, wheezing, or rubs. Unlabored respirations  HEART: Bradycardic rate and regular rhythm; No murmurs, rubs, or gallops  ABDOMEN: Bowel sounds present; Soft, Nontender, Nondistended. No hepatomegally  EXTREMITIES:  2+ Peripheral Pulses, brisk capillary refill. No clubbing, cyanosis, or edema  NERVOUS SYSTEM:  Alert & Oriented X3, speech clear. No deficits   MSK: FROM all 4 extremities, full and equal strength  SKIN: No rashes or lesions    =================================================    LABS:                        12.6   8.17  )-----------( 297      ( 02 May 2022 06:29 )             38.3     Auto Eosinophil # 0.00  / Auto Eosinophil % 0.0   / Auto Neutrophil # 6.23  / Auto Neutrophil % 76.3  / BANDS % x                            12.8   8.23  )-----------( 320      ( 01 May 2022 06:44 )             38.5     Auto Eosinophil # x     / Auto Eosinophil % x     / Auto Neutrophil # x     / Auto Neutrophil % x     / BANDS % x                            12.8   9.18  )-----------( 320      ( 30 Apr 2022 10:52 )             38.2     Auto Eosinophil # x     / Auto Eosinophil % x     / Auto Neutrophil # x     / Auto Neutrophil % x     / BANDS % x        05-01    141  |  107  |  12  ----------------------------<  76  4.1   |  20<L>  |  0.66  04-30    144  |  109<H>  |  7   ----------------------------<  86  3.8   |  20<L>  |  0.53    Ca    9.3      01 May 2022 06:45  Mg     2.0     05-01  Phos  2.6     05-01  TPro  6.8  /  Alb  3.9  /  TBili  0.6  /  DBili  x   /  AST  10  /  ALT  8<L>  /  AlkPhos  50  05-01                  RADIOLOGY & ADDITIONAL TESTS:    Imaging Personally Reviewed:    Consultant(s) Notes Reviewed:      Care Discussed with Consultants/Other Providers:

## 2022-05-02 NOTE — PROVIDER CONTACT NOTE (OTHER) - ASSESSMENT
Pt A&Ox4, reports dizziness but denies chest pain or shortness of breath. Vitals as charted.
Pt a&O 3; Vital signs as charted. Pt complaining of headache
Pt A&Ox4, VSS, bradycardic at baseline.  Reports headache and dizziness when standing.

## 2022-05-02 NOTE — CONSULT NOTE ADULT - SUBJECTIVE AND OBJECTIVE BOX
40 yo F w/ PMHx of hypothyroidism who presented with painful evolving rash x 3 weeks with new neck stiffness/pain. Underwent treatment for shingles, however rash spread to the bilaterally to the right and left sides of neck and this week to bilateral breasts and left inguinal region.  Presented to ED with neck stiffness and pain in neck with movement, mild frontal headache and generalized weakness. Underwent LP in ED to R/O infectious etiology of neck stiffness. Now w/ complaints of severe positional headache (sitting upright 10/10, supine 3/10), +photophobia. Describes pain as sub-orbital when supine and sub-occipital when standing. No improvement w/ conservative therapy.    PE:  Gen: AAOx3, NAD  CVS: RRR, S1/S2+  Resp: CTA B/L  Neuro: Non-focal    Labs: reviewed      A/P: 40 yo F w/ PMHx of hypothyroidism presented w/ rash and neck stiffness, now w/ post-dural puncture headache s/p LP.    - Discussed in depth w/ patient R/B/A for epidural blood patch. Procedure described in detail. Pt agreeable to procedure. Will plan for epidural blood patch at bedside. Consent to be obtained.  - Discussed possible need for second blood patch if incomplete relief.

## 2022-05-02 NOTE — PROVIDER CONTACT NOTE (OTHER) - REASON
Pt still complaining of headache spe escig
Pt reports dizziness
Headache and dizziness when standing

## 2022-05-02 NOTE — PROVIDER CONTACT NOTE (OTHER) - ACTION/TREATMENT ORDERED:
Will make day team aware.  Possible blood patch during the day tomorrow
Administer 500 CC LR Bolus as ordered
MD Ramirez Narayan made aware. MD to come at bedside

## 2022-05-02 NOTE — PROGRESS NOTE ADULT - ATTENDING COMMENTS
discussed about plan with resident on rounds. no acute complaints  labs and imaging reviewed.  I agree with the above findings, assessment, and plan with the following additions and exceptions:    Overall, 41F with PMH of hypothyroidism p/w subacute rash and new onset neck stiffness with initial concern for possible meningitis +/- disseminated zoster found to have like intertrigo with course c/b post-LP headache.    Active Issues:  #Rash/Intertrigo - c/w ketoconazole, hydrocortisone BID per derm, rash improving   #R/O Meningitis - s/p LP ; not consistent with meninigitis. acyclovir discontinued. f/u vzv pcr. ID following  #Headache - post LP headache s/p blood patch by anesthesia - monitor overnight. possible need for second blood patch if incomplete relief.   #Low TSH, free t4 wnl - c/w levothyroxine    discharge planning if headache improves

## 2022-05-02 NOTE — PROGRESS NOTE ADULT - PROBLEM SELECTOR PLAN 4
currently on synthroid 125mcg. Per patient, dose was recently increased to 137mcg daily as outpatient  Free T4: 1.6   TSH: 0.19  T3 total: 109     -c/w 125mcg dose for now

## 2022-05-02 NOTE — PROGRESS NOTE ADULT - PROBLEM SELECTOR PLAN 3
- CSF results negative so far.   -Brudzinski sign negative  - now with severe HA, 10/10, worse with sitting/standing  - given Esgic with mild relief of sxs  - Esgic q6 PRN. c/w continuous IV  fluids for possible postdural HA as per neuro   - ID recs appreciated

## 2022-05-02 NOTE — PROVIDER CONTACT NOTE (OTHER) - SITUATION
Patient has a headache and dizzy when standing
pt reports dizziness
Pt still complaining of headache, dry heaving

## 2022-05-02 NOTE — PROGRESS NOTE ADULT - PROBLEM SELECTOR PLAN 1
Persistent headache likely post-LP. Postural, relieved with laying flat.   encourage PO caffeine intake  continuous IVF  Esgic q6h  Lay flat as much as possible.   Decadron 4mg q6h (started 5/1-)    - Appreciate neuro recs: recommending decadron, IVF, and epidural blood patch. Epidural blood patch not done over the weekend per neuroradiology; told to call in AM 5/2.  -s/p Epidural blood patch placed by anesthesia today, continue to monitor overnight   -per anesthesia can discharge the patient if she is comfortable and able to stand up.

## 2022-05-03 ENCOUNTER — TRANSCRIPTION ENCOUNTER (OUTPATIENT)
Age: 42
End: 2022-05-03

## 2022-05-03 VITALS
HEART RATE: 49 BPM | OXYGEN SATURATION: 98 % | SYSTOLIC BLOOD PRESSURE: 119 MMHG | TEMPERATURE: 98 F | DIASTOLIC BLOOD PRESSURE: 67 MMHG | RESPIRATION RATE: 18 BRPM

## 2022-05-03 LAB — VZV DNA, PCR RESULT: NEGATIVE — SIGNIFICANT CHANGE UP

## 2022-05-03 PROCEDURE — 83735 ASSAY OF MAGNESIUM: CPT

## 2022-05-03 PROCEDURE — 89051 BODY FLUID CELL COUNT: CPT

## 2022-05-03 PROCEDURE — 87389 HIV-1 AG W/HIV-1&-2 AB AG IA: CPT

## 2022-05-03 PROCEDURE — 85025 COMPLETE CBC W/AUTO DIFF WBC: CPT

## 2022-05-03 PROCEDURE — 84157 ASSAY OF PROTEIN OTHER: CPT

## 2022-05-03 PROCEDURE — 84443 ASSAY THYROID STIM HORMONE: CPT

## 2022-05-03 PROCEDURE — 99285 EMERGENCY DEPT VISIT HI MDM: CPT

## 2022-05-03 PROCEDURE — 93005 ELECTROCARDIOGRAM TRACING: CPT

## 2022-05-03 PROCEDURE — 80061 LIPID PANEL: CPT

## 2022-05-03 PROCEDURE — 87205 SMEAR GRAM STAIN: CPT

## 2022-05-03 PROCEDURE — 87483 CNS DNA AMP PROBE TYPE 12-25: CPT

## 2022-05-03 PROCEDURE — 96375 TX/PRO/DX INJ NEW DRUG ADDON: CPT

## 2022-05-03 PROCEDURE — 80048 BASIC METABOLIC PNL TOTAL CA: CPT

## 2022-05-03 PROCEDURE — 36415 COLL VENOUS BLD VENIPUNCTURE: CPT

## 2022-05-03 PROCEDURE — 83036 HEMOGLOBIN GLYCOSYLATED A1C: CPT

## 2022-05-03 PROCEDURE — 80053 COMPREHEN METABOLIC PANEL: CPT

## 2022-05-03 PROCEDURE — 85610 PROTHROMBIN TIME: CPT

## 2022-05-03 PROCEDURE — 84480 ASSAY TRIIODOTHYRONINE (T3): CPT

## 2022-05-03 PROCEDURE — 86787 VARICELLA-ZOSTER ANTIBODY: CPT

## 2022-05-03 PROCEDURE — 84439 ASSAY OF FREE THYROXINE: CPT

## 2022-05-03 PROCEDURE — 85027 COMPLETE CBC AUTOMATED: CPT

## 2022-05-03 PROCEDURE — 99239 HOSP IP/OBS DSCHRG MGMT >30: CPT

## 2022-05-03 PROCEDURE — 62270 DX LMBR SPI PNXR: CPT

## 2022-05-03 PROCEDURE — 84100 ASSAY OF PHOSPHORUS: CPT

## 2022-05-03 PROCEDURE — 83615 LACTATE (LD) (LDH) ENZYME: CPT

## 2022-05-03 PROCEDURE — 87070 CULTURE OTHR SPECIMN AEROBIC: CPT

## 2022-05-03 PROCEDURE — 82945 GLUCOSE OTHER FLUID: CPT

## 2022-05-03 PROCEDURE — 96374 THER/PROPH/DIAG INJ IV PUSH: CPT

## 2022-05-03 PROCEDURE — 85730 THROMBOPLASTIN TIME PARTIAL: CPT

## 2022-05-03 PROCEDURE — 87798 DETECT AGENT NOS DNA AMP: CPT

## 2022-05-03 PROCEDURE — 87637 SARSCOV2&INF A&B&RSV AMP PRB: CPT

## 2022-05-03 PROCEDURE — 84702 CHORIONIC GONADOTROPIN TEST: CPT

## 2022-05-03 RX ORDER — HYDROCORTISONE 1 %
1 OINTMENT (GRAM) TOPICAL
Qty: 1 | Refills: 0
Start: 2022-05-03 | End: 2022-05-09

## 2022-05-03 RX ORDER — LEVOTHYROXINE SODIUM 125 MCG
1 TABLET ORAL
Qty: 30 | Refills: 0
Start: 2022-05-03 | End: 2022-06-01

## 2022-05-03 RX ORDER — LEVOTHYROXINE SODIUM 125 MCG
1 TABLET ORAL
Qty: 0 | Refills: 0 | DISCHARGE

## 2022-05-03 RX ORDER — CALAMINE AND ZINC OXIDE AND PHENOL 160; 10 MG/ML; MG/ML
1 LOTION TOPICAL
Qty: 1 | Refills: 0
Start: 2022-05-03 | End: 2022-05-16

## 2022-05-03 RX ORDER — ENOXAPARIN SODIUM 100 MG/ML
40 INJECTION SUBCUTANEOUS EVERY 24 HOURS
Refills: 0 | Status: DISCONTINUED | OUTPATIENT
Start: 2022-05-03 | End: 2022-05-03

## 2022-05-03 RX ADMIN — Medication 4 MILLIGRAM(S): at 05:48

## 2022-05-03 RX ADMIN — Medication 1 APPLICATION(S): at 05:49

## 2022-05-03 RX ADMIN — CALAMINE AND ZINC OXIDE AND PHENOL 1 APPLICATION(S): 160; 10 LOTION TOPICAL at 12:18

## 2022-05-03 RX ADMIN — ENOXAPARIN SODIUM 40 MILLIGRAM(S): 100 INJECTION SUBCUTANEOUS at 12:18

## 2022-05-03 RX ADMIN — Medication 25 MILLIGRAM(S): at 00:46

## 2022-05-03 RX ADMIN — Medication 1 APPLICATION(S): at 05:51

## 2022-05-03 RX ADMIN — Medication 125 MICROGRAM(S): at 05:49

## 2022-05-03 RX ADMIN — CALAMINE AND ZINC OXIDE AND PHENOL 1 APPLICATION(S): 160; 10 LOTION TOPICAL at 05:49

## 2022-05-03 NOTE — DISCHARGE NOTE NURSING/CASE MANAGEMENT/SOCIAL WORK - NSDCFUADDAPPT_GEN_ALL_CORE_FT
APPTS ARE READY TO BE MADE: [x] YES    Best Family or Patient Contact (if needed): [phone]    Additional Information about above appointments (if needed):    1: PCP   2: Dermatology   3:     Other comments or requests:

## 2022-05-03 NOTE — PROGRESS NOTE ADULT - SUBJECTIVE AND OBJECTIVE BOX
Pt seen and evaluated at bedside, resting comfortably. Upon arrival, pt noted to be comfortable and using phone, w/ HOB @ 30 degrees. She states HA is much improved s/p blood patch. She is able to tolerate sitting and standing, albeit she does endorse "feeling weak" while walking. States she has no residual headache when laying flat and minimal sub-occipital tenderness when standing. Photophobia has improved.    Afebrile, VSS    PE: unremarkable    A/P: 42 yo F w/ PMHx of hypothyroidism w/ admitted w/ rash and neck pain. S/P LP w/ PDPH, now s/p epidural blood patch.  - Postural headache markedly improved - tolerating sitting and standin.  - Encouraged PO hydration and increase caffeine intake  - Given current symptomatology, I did not offer a repeat epidural blood patch as I felt that the risks outweighed the benefit of a repeat blood patch.  - From an anesthesia standpoint, there is no contraindication to discharge.      Pt was very anxious about the possibility of headache recurrence and how to go about treating that. We discussed the etiology of PDPH and the low likelihood of spontaneous recurrence. Discussed other etiologies for HA. I specifically recommended to her that if her postural headache were to recur she should go to the ED and have them contact the anesthesia department for evaluation.

## 2022-05-03 NOTE — DISCHARGE NOTE NURSING/CASE MANAGEMENT/SOCIAL WORK - PATIENT PORTAL LINK FT
You can access the FollowMyHealth Patient Portal offered by [facility] by registering at the following website: [email or web address]. By joining [facility]’s FollowMyHealth portal, you will also be able to view your health information using other applications (apps) compatible with our system.

## 2022-05-03 NOTE — PROGRESS NOTE ADULT - PROBLEM SELECTOR PLAN 2
Painful evolving rash x 3 weeks with new neck stiffness/pain, c/f disseminated herpes zoster infection and possible meningitis.  No risk factors such as HIV, autoimmune diseases, immunosuppression, or exposures.  Significant neck stiffness and pain with movement, c/f complication of herpes zoster meningitis vs. subclinical meningeal irritation.  LP glucose, protein, cell count not suggestive of viral meningitis.  Not septic.  d/c'ed empiric acyclovir, precautions as likely not shingles  s/p NS 1L.    HIV negative  CSF VZV PCR neg  - CSF cultures NGTD  - VZV IgG+  VZV PCR  serum negative   most consistent with intertrigo, rash improved with creams     -Pain control with Tylenol for mild-mod pain, oxy 5 for severe pain  -derm recs appreciated: likely intertrigo: Ketoconazole 2% cream BID to affected area and Hydrocortisone 2.5% ointment BID to affected area for maximum of 2 weeks

## 2022-05-03 NOTE — PROGRESS NOTE ADULT - PROVIDER SPECIALTY LIST ADULT
Anesthesia
Anesthesia
Internal Medicine

## 2022-05-03 NOTE — PROGRESS NOTE ADULT - SUBJECTIVE AND OBJECTIVE BOX
PROGRESS NOTE:    Susan Ghotra MD  Internal Medicine PGY-1      Patient is a 41y old  Female who presents with a chief complaint of rash and neck pain (02 May 2022 12:15)      SUBJECTIVE / OVERNIGHT EVENTS: No acute overnight events. Pt seen and examined. Denies fevers, chills, CP, SOB, Abdominal pain, N/V, Constipation, Diarrhea      MEDICATIONS  (STANDING):  calamine/zinc oxide Lotion 1 Application(s) Topical four times a day  clotrimazole 1% Cream 1 Application(s) Topical two times a day  dexAMETHasone  Injectable 4 milliGRAM(s) IV Push every 6 hours  hydrocortisone 2.5% Lotion 1 Application(s) Topical two times a day  levothyroxine 125 MICROGram(s) Oral daily  melatonin 5 milliGRAM(s) Oral at bedtime  sodium chloride 0.9%. 1000 milliLiter(s) (100 mL/Hr) IV Continuous <Continuous>    MEDICATIONS  (PRN):  acetaminophen     Tablet .. 650 milliGRAM(s) Oral every 6 hours PRN Temp greater or equal to 38C (100.4F), Mild Pain (1 - 3)  acetaminophen 325 mG/butalbital 50 mG/caffeine 40 mG 1 Tablet(s) Oral every 6 hours PRN headache  ketorolac   Injectable 15 milliGRAM(s) IV Push every 8 hours PRN Severe Pain (7 - 10)  ondansetron Injectable 4 milliGRAM(s) IV Push every 8 hours PRN Nausea and/or Vomiting  oxyCODONE    IR 5 milliGRAM(s) Oral every 4 hours PRN Severe Pain (7 - 10)      I&O's Summary      Vital Signs Last 24 Hrs  T(C): 36.7 (03 May 2022 06:05), Max: 36.7 (02 May 2022 16:28)  T(F): 98 (03 May 2022 06:05), Max: 98 (02 May 2022 16:28)  HR: 60 (03 May 2022 06:05) (46 - 60)  BP: 107/69 (03 May 2022 06:05) (98/60 - 107/69)  BP(mean): --  RR: 18 (03 May 2022 06:05) (18 - 18)  SpO2: 96% (03 May 2022 06:05) (96% - 99%)    =================PHYSICAL EXAM=================    PHYSICAL EXAM:  GENERAL: NAD, lying in bed comfortably  HEAD:  Atraumatic, Normocephalic  EYES: EOMI, PERRLA, conjunctiva and sclera clear  ENT: Moist mucous membranes  NECK: Supple, No JVD  CHEST/LUNG: Clear to auscultation bilaterally; No rales, rhonchi, wheezing, or rubs. Unlabored respirations  HEART: Regular rate and rhythm; No murmurs, rubs, or gallops  ABDOMEN: Bowel sounds present; Soft, Nontender, Nondistended. No hepatomegally  EXTREMITIES:  2+ Peripheral Pulses, brisk capillary refill. No clubbing, cyanosis, or edema  NERVOUS SYSTEM:  Alert & Oriented X3, speech clear. No deficits   MSK: FROM all 4 extremities, full and equal strength  SKIN: No rashes or lesions    =================================================    LABS:                        12.6   8.17  )-----------( 297      ( 02 May 2022 06:29 )             38.3     Auto Eosinophil # 0.00  / Auto Eosinophil % 0.0   / Auto Neutrophil # 6.23  / Auto Neutrophil % 76.3  / BANDS % x        05-02    142  |  111<H>  |  10  ----------------------------<  109<H>  4.0   |  19<L>  |  0.54    Ca    8.7      02 May 2022 06:28  Mg     1.8     05-02  Phos  3.2     05-02  TPro  6.7  /  Alb  3.8  /  TBili  0.3  /  DBili  x   /  AST  7<L>  /  ALT  7<L>  /  AlkPhos  47  05-02    PT/INR - ( 02 May 2022 10:59 )   PT: 12.9 sec;   INR: 1.12 ratio                       RADIOLOGY & ADDITIONAL TESTS:    Imaging Personally Reviewed:    Consultant(s) Notes Reviewed:      Care Discussed with Consultants/Other Providers:   PROGRESS NOTE:    Susan Ghotra MD  Internal Medicine PGY-1      Patient is a 41y old  Female who presents with a chief complaint of rash and neck pain (02 May 2022 12:15)      SUBJECTIVE / OVERNIGHT EVENTS: No acute overnight events. Pt seen and examined. Patient states that her headache is much better today. She was able to stand. No more photophobia. Denies fevers, chills, CP, SOB, Abdominal pain, N/V, Constipation, Diarrhea      MEDICATIONS  (STANDING):  calamine/zinc oxide Lotion 1 Application(s) Topical four times a day  clotrimazole 1% Cream 1 Application(s) Topical two times a day  dexAMETHasone  Injectable 4 milliGRAM(s) IV Push every 6 hours  hydrocortisone 2.5% Lotion 1 Application(s) Topical two times a day  levothyroxine 125 MICROGram(s) Oral daily  melatonin 5 milliGRAM(s) Oral at bedtime  sodium chloride 0.9%. 1000 milliLiter(s) (100 mL/Hr) IV Continuous <Continuous>    MEDICATIONS  (PRN):  acetaminophen     Tablet .. 650 milliGRAM(s) Oral every 6 hours PRN Temp greater or equal to 38C (100.4F), Mild Pain (1 - 3)  acetaminophen 325 mG/butalbital 50 mG/caffeine 40 mG 1 Tablet(s) Oral every 6 hours PRN headache  ketorolac   Injectable 15 milliGRAM(s) IV Push every 8 hours PRN Severe Pain (7 - 10)  ondansetron Injectable 4 milliGRAM(s) IV Push every 8 hours PRN Nausea and/or Vomiting  oxyCODONE    IR 5 milliGRAM(s) Oral every 4 hours PRN Severe Pain (7 - 10)      I&O's Summary      Vital Signs Last 24 Hrs  T(C): 36.7 (03 May 2022 06:05), Max: 36.7 (02 May 2022 16:28)  T(F): 98 (03 May 2022 06:05), Max: 98 (02 May 2022 16:28)  HR: 60 (03 May 2022 06:05) (46 - 60)  BP: 107/69 (03 May 2022 06:05) (98/60 - 107/69)  BP(mean): --  RR: 18 (03 May 2022 06:05) (18 - 18)  SpO2: 96% (03 May 2022 06:05) (96% - 99%)    =================PHYSICAL EXAM=================    GENERAL: Well groomed, well appearing, NAD, sitting up in bed   EYES: EOMI, conjunctiva and sclera clear  ENT: Moist mucous membranes  NECK: Supple, improved ROM of back   CHEST/LUNG: Clear to auscultation bilaterally; No rales, rhonchi, wheezing, or rubs. Unlabored respirations  HEART: Bradycardic rate and regular rhythm; No murmurs, rubs, or gallops  ABDOMEN: Bowel sounds present; Soft, Nontender, Nondistended. No hepatomegally  EXTREMITIES:  2+ Peripheral Pulses, brisk capillary refill. No clubbing, cyanosis, or edema  NERVOUS SYSTEM:  Alert & Oriented X3, speech clear. No deficits   MSK: FROM all 4 extremities, full and equal strength  SKIN: No rashes or lesions    =================================================    LABS:                        12.6   8.17  )-----------( 297      ( 02 May 2022 06:29 )             38.3     Auto Eosinophil # 0.00  / Auto Eosinophil % 0.0   / Auto Neutrophil # 6.23  / Auto Neutrophil % 76.3  / BANDS % x        05-02    142  |  111<H>  |  10  ----------------------------<  109<H>  4.0   |  19<L>  |  0.54    Ca    8.7      02 May 2022 06:28  Mg     1.8     05-02  Phos  3.2     05-02  TPro  6.7  /  Alb  3.8  /  TBili  0.3  /  DBili  x   /  AST  7<L>  /  ALT  7<L>  /  AlkPhos  47  05-02    PT/INR - ( 02 May 2022 10:59 )   PT: 12.9 sec;   INR: 1.12 ratio                       RADIOLOGY & ADDITIONAL TESTS:    Imaging Personally Reviewed:    Consultant(s) Notes Reviewed:      Care Discussed with Consultants/Other Providers:

## 2022-05-03 NOTE — PROGRESS NOTE ADULT - ASSESSMENT
41F with hypothyroidism presents with painful evolving rash x 3 weeks with new neck stiffness/pain, c/f disseminated herpes zoster infection and possible meningitis.
41F with hypothyroidism presents with painful evolving rash x 3 weeks with new neck stiffness/pain, c/f disseminated herpes zoster infection and possible meningitis.
41F with hypothyroidism presents with painful evolving rash x 3 weeks with new neck stiffness/pain, c/f disseminated herpes zoster infection and possible meningitis. S/p LP with residual low pressure headache. Anesthesia placed Epidural blood patch on  5/2
41F with hypothyroidism presents with painful evolving rash x 3 weeks with new neck stiffness/pain, c/f disseminated herpes zoster infection and possible meningitis.
41F with hypothyroidism presents with painful evolving rash x 3 weeks with new neck stiffness/pain, c/f disseminated herpes zoster infection and possible meningitis. S/p LP with residual low pressure headache. Anesthesia placed Epidural blood patch on  5/2

## 2022-05-03 NOTE — PROGRESS NOTE ADULT - PROBLEM SELECTOR PLAN 1
Persistent headache likely post-LP. Postural, relieved with laying flat.   encourage PO caffeine intake  continuous IVF  Esgic q6h  s/p Epidural blood patch placed by anesthesia today, continue to monitor overnight   per anesthesia can discharge the patient if she is comfortable and able to stand up.  Lay flat as much as possible.   Decadron 4mg q6h (started 5/1-3/3  patient has shown much improvement after epidural patch     -ctm   -discharge today Persistent headache likely post-LP. Postural, relieved with laying flat.   encourage PO caffeine intake  continuous IVF  Esgic q6h  s/p Epidural blood patch placed by anesthesia today, continue to monitor overnight   per anesthesia can discharge the patient if she is comfortable and able to stand up.  Lay flat as much as possible.   Decadron 4mg q6h (started 5/1-3/3  patient has shown much improvement after epidural patch     -ctm   -no additional epidural patche; per anesthesia benefits does not outweigh the risk for a second epidural patch   -discharge today

## 2022-05-03 NOTE — DISCHARGE NOTE NURSING/CASE MANAGEMENT/SOCIAL WORK - NSDCPEFALRISK_GEN_ALL_CORE
For information on Fall & Injury Prevention, visit: [email or web address] OR  [email or web address] OR  https://www.cdc.gov/steadi/patient.html

## 2022-05-03 NOTE — PROGRESS NOTE ADULT - PROBLEM SELECTOR PLAN 4
currently on synthroid 125mcg. Per patient, dose was recently increased to 137mcg daily as outpatient  Free T4: 1.6   TSH: 0.19  T3 total: 109     -c/w 125mcg dose for now  -can f/u with outpatient PCP about further management

## 2022-05-03 NOTE — PROGRESS NOTE ADULT - PROBLEM SELECTOR PLAN 5
Diet: Regular  DVT PPx: low improve score  Dispo: pending clinical improvement
Diet: Regular  DVT PPx: enoxaparin  Dispo: pending clinical improvement
Diet: Regular  DVT PPx: low improve score

## 2022-05-03 NOTE — PROGRESS NOTE ADULT - REASON FOR ADMISSION
rash and neck pain

## 2022-05-03 NOTE — PROGRESS NOTE ADULT - ATTENDING COMMENTS
discussed about plan with resident on rounds. very slight headache but overall significantly improved, no more photophobia. able to ambulate  labs and imaging reviewed.  I agree with the above findings, assessment, and plan with the following additions and exceptions:    Overall, 41F with PMH of hypothyroidism p/w subacute rash and new onset neck stiffness with initial concern for possible meningitis +/- disseminated zoster found to have like intertrigo with course c/b post-LP headache.    Active Issues:  #Rash/Intertrigo - c/w ketoconazole, hydrocortisone BID per derm, rash almost resolved. patient to f/u with derm outpt  #R/O Meningitis - s/p LP ; not consistent with meninigitis. acyclovir discontinued. vzv pcr negative. ID following  #Headache - post LP headache s/p blood patch by anesthesia and overall significantly improved. anesthesia f/u appreciated - no need for another blood patch.    #Low TSH, free t4 wnl - c/w levothyroxine    patient told if recurrent symptoms to return to ED.   discharge time - 35 minutes

## 2022-09-02 NOTE — CONSULT NOTE ADULT - ASSESSMENT
Want to make sure this was filled out while I was away. I didn't find in my box Pt is a 42yo LMP 11/29 POD#2 from MUS & cysto on 12/2 presenting to the ED w/c/f a "bulge" in her vagina. VSS. Bulge c/w location of anterior repair from MUS placement, no mesh noted eroded through wall, no dehiscence of incision noted.    -pt clear for discharge from gyn perspective  -pt to f/u in Dr. Glass's office per routine  -pt to call Dr. Glass's office w/ c/o fevers, chills, increased pain, increased vaginal discharge    Peter Avalos, PGY2  D/w Dr. Ramos, Urogyn Fellow Pt is a 42yo LMP 11/29 POD#2 from MUS & cysto on 12/2 presenting to the ED w/c/f a "bulge" in her vagina. Bulge c/w location of anterior repair from MUS placement, no mesh noted eroded through wall, no dehiscence of incision noted. Pt's BP stable, afebrile, mildly bradycardic in the ED.    -pt clear for discharge from gyn perspective  -pt to f/u in Dr. Glass's office per routine  -pt to call Dr. Glass's office w/ c/o fevers, chills, increased pain, increased vaginal discharge    Peter Avalos, PGY2  Pt examined w/ Dr. Jorge PGY4  D/w Dr. Ramos, Urogyn Fellow

## 2023-01-25 ENCOUNTER — EMERGENCY (EMERGENCY)
Facility: HOSPITAL | Age: 43
LOS: 1 days | Discharge: HOME | End: 2023-01-25
Attending: EMERGENCY MEDICINE
Payer: COMMERCIAL

## 2023-01-25 VITALS
OXYGEN SATURATION: 99 % | SYSTOLIC BLOOD PRESSURE: 108 MMHG | WEIGHT: 153 LBS | HEART RATE: 63 BPM | DIASTOLIC BLOOD PRESSURE: 66 MMHG | TEMPERATURE: 98 F | HEIGHT: 67 IN | RESPIRATION RATE: 18 BRPM

## 2023-01-25 DIAGNOSIS — Z98.82 BREAST IMPLANT STATUS: Chronic | ICD-10-CM

## 2023-01-25 DIAGNOSIS — Z98.891 HISTORY OF UTERINE SCAR FROM PREVIOUS SURGERY: Chronic | ICD-10-CM

## 2023-01-25 DIAGNOSIS — Z98.89 OTHER SPECIFIED POSTPROCEDURAL STATES: Chronic | ICD-10-CM

## 2023-01-25 LAB
ALBUMIN SERPL ELPH-MCNC: 4.6 G/DL — SIGNIFICANT CHANGE UP (ref 3.3–5)
ALP SERPL-CCNC: 53 U/L — SIGNIFICANT CHANGE UP (ref 40–120)
ALT FLD-CCNC: 13 U/L — SIGNIFICANT CHANGE UP (ref 10–45)
ANION GAP SERPL CALC-SCNC: 11 MMOL/L — SIGNIFICANT CHANGE UP (ref 5–17)
AST SERPL-CCNC: 15 U/L — SIGNIFICANT CHANGE UP (ref 10–40)
BASE EXCESS BLDV CALC-SCNC: -0.8 MMOL/L — SIGNIFICANT CHANGE UP (ref -2–3)
BASOPHILS # BLD AUTO: 0.1 K/UL — SIGNIFICANT CHANGE UP (ref 0–0.2)
BASOPHILS NFR BLD AUTO: 1.1 % — SIGNIFICANT CHANGE UP (ref 0–2)
BILIRUB SERPL-MCNC: 0.3 MG/DL — SIGNIFICANT CHANGE UP (ref 0.2–1.2)
BUN SERPL-MCNC: 13 MG/DL — SIGNIFICANT CHANGE UP (ref 7–23)
CA-I SERPL-SCNC: 1.23 MMOL/L — SIGNIFICANT CHANGE UP (ref 1.15–1.33)
CALCIUM SERPL-MCNC: 9.5 MG/DL — SIGNIFICANT CHANGE UP (ref 8.4–10.5)
CHLORIDE BLDV-SCNC: 106 MMOL/L — SIGNIFICANT CHANGE UP (ref 96–108)
CHLORIDE SERPL-SCNC: 106 MMOL/L — SIGNIFICANT CHANGE UP (ref 96–108)
CO2 BLDV-SCNC: 27 MMOL/L — HIGH (ref 22–26)
CO2 SERPL-SCNC: 23 MMOL/L — SIGNIFICANT CHANGE UP (ref 22–31)
CREAT SERPL-MCNC: 0.82 MG/DL — SIGNIFICANT CHANGE UP (ref 0.5–1.3)
EGFR: 91 ML/MIN/1.73M2 — SIGNIFICANT CHANGE UP
EOSINOPHIL # BLD AUTO: 0.51 K/UL — HIGH (ref 0–0.5)
EOSINOPHIL NFR BLD AUTO: 5.8 % — SIGNIFICANT CHANGE UP (ref 0–6)
FLUAV AG NPH QL: SIGNIFICANT CHANGE UP
FLUBV AG NPH QL: SIGNIFICANT CHANGE UP
GAS PNL BLDV: 136 MMOL/L — SIGNIFICANT CHANGE UP (ref 136–145)
GAS PNL BLDV: SIGNIFICANT CHANGE UP
GLUCOSE BLDV-MCNC: 128 MG/DL — HIGH (ref 70–99)
GLUCOSE SERPL-MCNC: 128 MG/DL — HIGH (ref 70–99)
HCG SERPL-ACNC: <2 MIU/ML — SIGNIFICANT CHANGE UP
HCO3 BLDV-SCNC: 25 MMOL/L — SIGNIFICANT CHANGE UP (ref 22–29)
HCT VFR BLD CALC: 39.6 % — SIGNIFICANT CHANGE UP (ref 34.5–45)
HCT VFR BLDA CALC: 41 % — SIGNIFICANT CHANGE UP (ref 34.5–46.5)
HGB BLD CALC-MCNC: 13.5 G/DL — SIGNIFICANT CHANGE UP (ref 11.7–16.1)
HGB BLD-MCNC: 12.9 G/DL — SIGNIFICANT CHANGE UP (ref 11.5–15.5)
IMM GRANULOCYTES NFR BLD AUTO: 0.1 % — SIGNIFICANT CHANGE UP (ref 0–0.9)
LACTATE BLDV-MCNC: 0.9 MMOL/L — SIGNIFICANT CHANGE UP (ref 0.5–2)
LYMPHOCYTES # BLD AUTO: 4.54 K/UL — HIGH (ref 1–3.3)
LYMPHOCYTES # BLD AUTO: 51.2 % — HIGH (ref 13–44)
MCHC RBC-ENTMCNC: 32.3 PG — SIGNIFICANT CHANGE UP (ref 27–34)
MCHC RBC-ENTMCNC: 32.6 GM/DL — SIGNIFICANT CHANGE UP (ref 32–36)
MCV RBC AUTO: 99.2 FL — SIGNIFICANT CHANGE UP (ref 80–100)
MONOCYTES # BLD AUTO: 0.55 K/UL — SIGNIFICANT CHANGE UP (ref 0–0.9)
MONOCYTES NFR BLD AUTO: 6.2 % — SIGNIFICANT CHANGE UP (ref 2–14)
NEUTROPHILS # BLD AUTO: 3.15 K/UL — SIGNIFICANT CHANGE UP (ref 1.8–7.4)
NEUTROPHILS NFR BLD AUTO: 35.6 % — LOW (ref 43–77)
NRBC # BLD: 0 /100 WBCS — SIGNIFICANT CHANGE UP (ref 0–0)
PCO2 BLDV: 47 MMHG — HIGH (ref 39–42)
PH BLDV: 7.34 — SIGNIFICANT CHANGE UP (ref 7.32–7.43)
PLATELET # BLD AUTO: 311 K/UL — SIGNIFICANT CHANGE UP (ref 150–400)
PO2 BLDV: 22 MMHG — LOW (ref 25–45)
POTASSIUM BLDV-SCNC: 5.9 MMOL/L — HIGH (ref 3.5–5.1)
POTASSIUM SERPL-MCNC: 4.3 MMOL/L — SIGNIFICANT CHANGE UP (ref 3.5–5.3)
POTASSIUM SERPL-SCNC: 4.3 MMOL/L — SIGNIFICANT CHANGE UP (ref 3.5–5.3)
PROT SERPL-MCNC: 7.5 G/DL — SIGNIFICANT CHANGE UP (ref 6–8.3)
RBC # BLD: 3.99 M/UL — SIGNIFICANT CHANGE UP (ref 3.8–5.2)
RBC # FLD: 13.6 % — SIGNIFICANT CHANGE UP (ref 10.3–14.5)
RSV RNA NPH QL NAA+NON-PROBE: SIGNIFICANT CHANGE UP
SAO2 % BLDV: 26.2 % — LOW (ref 67–88)
SARS-COV-2 RNA SPEC QL NAA+PROBE: SIGNIFICANT CHANGE UP
SODIUM SERPL-SCNC: 140 MMOL/L — SIGNIFICANT CHANGE UP (ref 135–145)
WBC # BLD: 8.86 K/UL — SIGNIFICANT CHANGE UP (ref 3.8–10.5)
WBC # FLD AUTO: 8.86 K/UL — SIGNIFICANT CHANGE UP (ref 3.8–10.5)

## 2023-01-25 PROCEDURE — 82803 BLOOD GASES ANY COMBINATION: CPT

## 2023-01-25 PROCEDURE — 82947 ASSAY GLUCOSE BLOOD QUANT: CPT

## 2023-01-25 PROCEDURE — 87637 SARSCOV2&INF A&B&RSV AMP PRB: CPT

## 2023-01-25 PROCEDURE — 80053 COMPREHEN METABOLIC PANEL: CPT

## 2023-01-25 PROCEDURE — 85018 HEMOGLOBIN: CPT

## 2023-01-25 PROCEDURE — 83605 ASSAY OF LACTIC ACID: CPT

## 2023-01-25 PROCEDURE — 84443 ASSAY THYROID STIM HORMONE: CPT

## 2023-01-25 PROCEDURE — 99285 EMERGENCY DEPT VISIT HI MDM: CPT

## 2023-01-25 PROCEDURE — 85014 HEMATOCRIT: CPT

## 2023-01-25 PROCEDURE — 84295 ASSAY OF SERUM SODIUM: CPT

## 2023-01-25 PROCEDURE — 93005 ELECTROCARDIOGRAM TRACING: CPT

## 2023-01-25 PROCEDURE — 99283 EMERGENCY DEPT VISIT LOW MDM: CPT

## 2023-01-25 PROCEDURE — 84702 CHORIONIC GONADOTROPIN TEST: CPT

## 2023-01-25 PROCEDURE — 82330 ASSAY OF CALCIUM: CPT

## 2023-01-25 PROCEDURE — 82435 ASSAY OF BLOOD CHLORIDE: CPT

## 2023-01-25 PROCEDURE — 84132 ASSAY OF SERUM POTASSIUM: CPT

## 2023-01-25 PROCEDURE — 85025 COMPLETE CBC W/AUTO DIFF WBC: CPT

## 2023-01-25 NOTE — ED PROVIDER NOTE - RAPID ASSESSMENT
GonzalesStella  PMD: Hermelindo Villa [phone]  41 y/o F PMHx HTN, terminal kidney atrophy, overactive bladder, HTN, asthma, hypothyroid, presents to the ED c/o constipation x3days accompanied with abd cramping, nausea, headaches. Pt reports recently started passing gas. Pt reports being hospitalized for spinal headache last year. Currently takes medications for HTN and thyroid. The last time pt had their thyroid checked up was four months ago and had no remarkable new findings. Reports being sexually active. Pt denies any past surgery in the abd. Denies having DM. Pt denies all other acute complaints.    Kevin LIM (Scribe) have documented this rapid assessment note under the dictation of Jose George) which has been reviewed and affirmed to be accurate. Patient was seen as a QDOC patient. The patient will be seen and further worked up in the main emergency department and their care will be completed by the main emergency department team along with a thorough physical exam. Receiving team will follow up on labs, analgesia, any clinical imaging, reassess and disposition as clinically indicated, all decisions regarding the progression of care will be made at their discretion. Stella Rolon  PMD: Hermelindo Villa [phone]  43 y/o F PMHx HTN, terminal kidney atrophy, overactive bladder, HTN, asthma, hypothyroid, presents to the ED c/o constipation 3 weeks accompanied with abd cramping, nausea, headaches. Pt reports recently started passing gas. Pt reports being hospitalized for spinal headache last year. Currently takes medications for HTN and thyroid. The last time pt had their thyroid checked up was four months ago and had no remarkable new findings. Reports being sexually active. Pt denies any past surgery in the abd. Denies having DM. Pt denies all other acute complaints. PE Awake alert nad  Jose George MD, Kevin Mac (Scribe) have documented this rapid assessment note under the dictation of Jose George (MD) which has been reviewed and affirmed to be accurate. Patient was seen as a QDOC patient. The patient will be seen and further worked up in the main emergency department and their care will be completed by the main emergency department team along with a thorough physical exam. Receiving team will follow up on labs, analgesia, any clinical imaging, reassess and disposition as clinically indicated, all decisions regarding the progression of care will be made at their discretion.    PT seen as Q-doc/Triage with scribe, full evaluation to be performed once pt is transferred to Main ED  Jose George MD, [place]

## 2023-01-25 NOTE — ED PROVIDER NOTE - CLINICAL SUMMARY MEDICAL DECISION MAKING FREE TEXT BOX
42y Female with hypothyroidism here for constipation for the past 3 weeks.  She has no recent changes in her levothyroxine dose.  She has no concerning signs or symptoms for a small or large bowel obstruction.  Differential diagnosis includes but is not limited to idiopathic constipation, subtherapeutic levothyroxine dosing, electrolyte abnormality.  At this time there is no indication for abdominal imaging.  Will give oral laxative medication and reassess patient.  Anticipate discharge home with symptomatic improvement.

## 2023-01-25 NOTE — ED PROVIDER NOTE - NSFOLLOWUPINSTRUCTIONS_ED_ALL_ED_FT
You were seen in the emergency department for constipation. We have evaluated you and determined that you do not require further hospital interventions.    During your stay you had the following relevant results: normal electrolytes (salts in blood). Your thyroid stimulating hormone level is still being tested; you will be called if there is a critically abnormal value.    You are being sent home with TWO prescriptions. Please take them as instructed.    Please follow up with your primary care provider to discuss the results of your stay in our department.    If you start to experience worsening symptoms such as inability to pass gas, nausea or vomiting, fevers, chills, please return to the emergency department for further evaluation.

## 2023-01-25 NOTE — ED ADULT TRIAGE NOTE - INTERNATIONAL TRAVEL
No PRINCIPAL DISCHARGE DIAGNOSIS  Diagnosis: Acute kidney injury superimposed on CKD  Assessment and Plan of Treatment: You had an increase in a lab marker that was use to measure kidney function. That lab marker is called "creatinine." We think that this decrease in kidney function may be related to the autoimmune conditions that youo were diagnosed with. If you have any decreased urine production, chest pain,  or shortness of breath please immeediately go to the emergency room.      SECONDARY DISCHARGE DIAGNOSES  Diagnosis: Acute hypoxemic respiratory failure  Assessment and Plan of Treatment: Earlier in your hospital course you requrired oxygen and had difficulty breathing. We controlled your blood pressure with a medication called captopril. Controlling your blood pressure and diuretic medication helped decrease your oyxgen requirement. If you have any decreased urine production, chest pain,  or shortness of breath please immeediately go to the emergency room.    Diagnosis: Acute renal failure  Assessment and Plan of Treatment:     Diagnosis: Acute kidney injury superimposed on CKD  Assessment and Plan of Treatment:      PRINCIPAL DISCHARGE DIAGNOSIS  Diagnosis: Acute kidney injury superimposed on CKD  Assessment and Plan of Treatment: You had an increase in a lab marker that was use to measure kidney function. That lab marker is called "creatinine." We think that this decrease in kidney function may be related to the autoimmune conditions that you were diagnosed with. If you have any decreased urine production, worsening back pain, chest pain, or shortness of breath please seek immediate medical attention. Please follow up with the providers listed.      SECONDARY DISCHARGE DIAGNOSES  Diagnosis: Acute hypoxemic respiratory failure  Assessment and Plan of Treatment: Earlier in your hospital course you requrired oxygen and had difficulty breathing. We controlled your blood pressure with a medication called captopril. Controlling your blood pressure and diuretic medication helped decrease your oyxgen requirement. If you develop any shortness of breath, chest pain, significant changes to your mental status, or loss of consciousness, please immediately go to the emergency room.    Diagnosis: Acute renal failure  Assessment and Plan of Treatment:     Diagnosis: Acute kidney injury superimposed on CKD  Assessment and Plan of Treatment:      PRINCIPAL DISCHARGE DIAGNOSIS  Diagnosis: Acute kidney injury superimposed on CKD  Assessment and Plan of Treatment: You had an increase in a lab marker that was use to measure kidney function. That lab marker is called "creatinine." We think that this decrease in kidney function may be related to the autoimmune conditions that you were diagnosed with. You were seen by nephrology and rheumatology who suggessted obtaining a kidney biopsy to determine the cause of the kidney injury.  If you have any decreased urine production, worsening back pain, chest pain, or shortness of breath please seek immediate medical attention.   Please follow-up with nephrology, rheumatology, and your PCP within 1-2 weeks of hospital discharge to discuss the results of your biopsy and the next steps in management.      SECONDARY DISCHARGE DIAGNOSES  Diagnosis: Hypertension  Assessment and Plan of Treatment: During your hospitalization, you had very elevated blood pressures which led to delay in obtaining the kidney biopsy and also lead to difficulty breathing. We controlled your blood pressure with several medications including captopril and felodipine. If you have severe headaches, chest pain, shortness of breath, dizziness, changes in vision, please visit the emergency room immediatedly.  Please follow-up with nephrology, rheumatology, and your PCP within 1-2 weeks of hospital discharge to discuss the results of your biopsy and the next steps in management.    Diagnosis: Acute hypoxemic respiratory failure  Assessment and Plan of Treatment: Earlier in your hospital course you requrired oxygen and had difficulty breathing. We controlled your blood pressure with a medication called captopril. Controlling your blood pressure and diuretic medication helped decrease your oyxgen requirement. If you develop any shortness of breath, chest pain, significant changes to your mental status, or loss of consciousness, please immediately go to the emergency room.  Please follow up with nephrology and your PCP within 1-2 weeks of hospital discharge.     PRINCIPAL DISCHARGE DIAGNOSIS  Diagnosis: Acute kidney injury superimposed on CKD  Assessment and Plan of Treatment: You had an increase in a lab marker that was use to measure kidney function. That lab marker is called "creatinine." We think that this decrease in kidney function may be related to the autoimmune conditions that you were diagnosed with. You were seen by nephrology and rheumatology who suggessted obtaining a kidney biopsy to determine the cause of the kidney injury.  However, due to elevated blood pressures, the biopsy was cancelled multiple times. If you have any decreased urine production, worsening back pain, chest pain, or shortness of breath please seek immediate medical attention.   Please follow-up with interventional radiology, nephrology, rheumatology, and your PCP within 1-2 weeks of hospital discharge.      SECONDARY DISCHARGE DIAGNOSES  Diagnosis: Acute hypoxemic respiratory failure  Assessment and Plan of Treatment: Earlier in your hospital course you requrired oxygen and had difficulty breathing. We controlled your blood pressure with a medication called captopril. Controlling your blood pressure and diuretic medication helped decrease your oyxgen requirement. If you develop any shortness of breath, chest pain, significant changes to your mental status, or loss of consciousness, please immediately go to the emergency room.  Please follow up with nephrology and your PCP within 1-2 weeks of hospital discharge.    Diagnosis: Hypertension  Assessment and Plan of Treatment: During your hospitalization, you had very elevated blood pressures which led to the cancellation of your kidney biopsy and also lead to difficulty breathing. We controlled your blood pressure with several medications including captopril and felodipine. Please continue taking these medications as directed. If you have severe headaches, chest pain, shortness of breath, dizziness, changes in vision, please visit the emergency room immediatedly.  Please follow-up with nephrology, rheumatology, and your PCP within 1-2 weeks of hospital discharge.

## 2023-01-25 NOTE — ED PROVIDER NOTE - OBJECTIVE STATEMENT
42-year-old female with past medical history of hypothyroidism, hypertension, here for 3 weeks of decreased stool.  She states that she was in her usual state of health prior to this.  She has been passing pellet-like stool every 4 to 5 days.  Her last bowel movement was 2 days ago when she continues to pass gas.  She has tried celery juice, Metamucil, rectal suppositories, with only some improvement.  She has not been to her PMD since onset of symptoms.  She denies any fevers or chills.  She further denies any nausea or vomiting.  She continues to have a normal diet.  She has recent intentional weight loss.

## 2023-01-25 NOTE — ED PROVIDER NOTE - PHYSICAL EXAMINATION
General: well appearing, alert, oriented to person, time, place  Psych: mood appropriate  Head: normocephalic; atraumatic  Eyes: conjunctivae clear bilaterally, sclerae anicteric  ENT: no nasal flaring, patent nares  Cardio: Skin warm and well-perfused  Resp: Normal respiratory effort  GI: soft/nondistended/nontender  : no CVA tenderness  Neuro: normal sensation, moving all four extremities equally  Skin: No evidence of rash or bruising  MSK: normal movement of all extremities  Lymph/Vasc: no LE edema

## 2023-01-25 NOTE — ED PROVIDER NOTE - PATIENT PORTAL LINK FT
You can access the FollowMyHealth Patient Portal offered by [facility] by registering at the following website: [email or web address]. By joining [employer]’s FollowMyHealth portal, you will also be able to view your health information using other applications (apps) compatible with our system.

## 2023-01-25 NOTE — ED PROVIDER NOTE - NS ED ROS FT
GENERAL: No fever, no chills, no malaise, no fatigue   ENT: No earache, no coryza, no sore throat   NECK: No stiffness, no swollen glands, no dysphagia   RESPIRATORY: No cough, no dyspnea, no pleuritic chest pain   HEART: no chest pain, no palpitations, no edema, no JVD   ABDOMEN: +constipation; no abdominal pain, no nausea, no vomiting, no diarrhea   : No dysuria, no increase frequency, no urgency, No discharge   MUSCULAR-SKELETAL: No myalgia, no arthralgia   NEUROLOGY: No headache, no vertigo, no paresthesia, no focal deficits, no diplopia  SKIN: No rash, no evidence of trauma  All other ROS are negative

## 2023-01-25 NOTE — ED PROVIDER NOTE - ATTENDING CONTRIBUTION TO CARE
Patient is denying any vomiting, abdominal pain, and is still passing stool.  Her only surgical history is a hysterectomy.  On exam she is very well-appearing, no acute distress, unremarkable vital signs, nondistended abdomen, nontender.  Suspicion for SBO or stercoral colitis is very low.  No electrolyte abnormality.  There may be a degree of hypothyroid given she endorses fatigue in addition to the constipation, but no need for emergent treatment for this.  Patient will be treated symptomatically with laxative at home and instructed to follow-up with her PCP for further management of her thyroid meds.  Patient given return precautions if symptoms worsen.

## 2023-01-26 VITALS
HEART RATE: 55 BPM | RESPIRATION RATE: 16 BRPM | OXYGEN SATURATION: 100 % | SYSTOLIC BLOOD PRESSURE: 115 MMHG | DIASTOLIC BLOOD PRESSURE: 70 MMHG | TEMPERATURE: 98 F

## 2023-01-26 LAB — TSH SERPL-MCNC: 2.02 UIU/ML — SIGNIFICANT CHANGE UP (ref 0.27–4.2)

## 2023-01-26 RX ORDER — LACTULOSE 10 G/15ML
1 SOLUTION ORAL
Qty: 7 | Refills: 0
Start: 2023-01-26 | End: 2023-02-01

## 2023-01-26 RX ORDER — MULTIVIT WITH MIN/MFOLATE/K2 340-15/3 G
300 POWDER (GRAM) ORAL
Qty: 4200 | Refills: 0
Start: 2023-01-26 | End: 2023-02-08

## 2023-01-26 RX ORDER — SENNA PLUS 8.6 MG/1
2 TABLET ORAL
Qty: 28 | Refills: 0
Start: 2023-01-26 | End: 2023-02-08

## 2023-01-26 NOTE — ED POST DISCHARGE NOTE - OTHER COMMUNICATION
Patient called back ER stating mag citrate not available and currently on back order everywhere.  Taking miralax, senna and enema without relief.  Offered lactulose.  made aware medication may cause bloating and initially worsening abd discomfort.  Patient agreeable.  Rx sent.  -Jori Beaulieu PA-C

## 2023-01-26 NOTE — ED ADULT NURSE NOTE - NSIMPLEMENTINTERV_GEN_ALL_ED
Implemented All Universal Safety Interventions:  [place] to call system. Call bell, personal items and telephone within reach. Instruct patient to call for assistance. Room bathroom lighting operational. Non-slip footwear when patient is off stretcher. Physically safe environment: no spills, clutter or unnecessary equipment. Stretcher in lowest position, wheels locked, appropriate side rails in place.

## 2023-01-26 NOTE — ED ADULT NURSE NOTE - OBJECTIVE STATEMENT
42yF, independent, PMH hypothyroidism, HTN, presents to the ED x3 weeks of decreased stool.  She states that she was in her usual state of health prior to this.  She has been passing pellet-like stool every 4 to 5 days.  Her last bowel movement was 2 days ago, states she is still passing gas. Pt has tried celery juice, Metamucil, rectal suppositories, with only some improvement. Gross neuro intact, no difficulty speaking in complete sentences, pulses x 4, moving all extremities, abdomen soft nontender nondistended, skin intact. Pt denies fevers/chills, numbness/tingling, weakness, headache, dizziness, vision changes, cp, sob, cough, abd pain, n/v/d, dysuria, hematuria, bloody stools, back pain.

## 2023-01-27 ENCOUNTER — EMERGENCY (EMERGENCY)
Facility: HOSPITAL | Age: 43
LOS: 1 days | Discharge: HOME | End: 2023-01-27
Attending: EMERGENCY MEDICINE
Payer: COMMERCIAL

## 2023-01-27 VITALS
TEMPERATURE: 98 F | RESPIRATION RATE: 18 BRPM | HEART RATE: 43 BPM | OXYGEN SATURATION: 98 % | WEIGHT: 149.91 LBS | HEIGHT: 67 IN | DIASTOLIC BLOOD PRESSURE: 85 MMHG | SYSTOLIC BLOOD PRESSURE: 131 MMHG

## 2023-01-27 DIAGNOSIS — Z98.82 BREAST IMPLANT STATUS: Chronic | ICD-10-CM

## 2023-01-27 DIAGNOSIS — Z98.89 OTHER SPECIFIED POSTPROCEDURAL STATES: Chronic | ICD-10-CM

## 2023-01-27 DIAGNOSIS — Z98.891 HISTORY OF UTERINE SCAR FROM PREVIOUS SURGERY: Chronic | ICD-10-CM

## 2023-01-27 LAB
ALBUMIN SERPL ELPH-MCNC: 4.5 G/DL — SIGNIFICANT CHANGE UP (ref 3.3–5)
ALP SERPL-CCNC: 51 U/L — SIGNIFICANT CHANGE UP (ref 40–120)
ALT FLD-CCNC: 11 U/L — SIGNIFICANT CHANGE UP (ref 10–45)
ANION GAP SERPL CALC-SCNC: 11 MMOL/L — SIGNIFICANT CHANGE UP (ref 5–17)
AST SERPL-CCNC: 14 U/L — SIGNIFICANT CHANGE UP (ref 10–40)
BASOPHILS # BLD AUTO: 0.09 K/UL — SIGNIFICANT CHANGE UP (ref 0–0.2)
BASOPHILS NFR BLD AUTO: 1 % — SIGNIFICANT CHANGE UP (ref 0–2)
BILIRUB SERPL-MCNC: 0.4 MG/DL — SIGNIFICANT CHANGE UP (ref 0.2–1.2)
BUN SERPL-MCNC: 11 MG/DL — SIGNIFICANT CHANGE UP (ref 7–23)
CALCIUM SERPL-MCNC: 9.4 MG/DL — SIGNIFICANT CHANGE UP (ref 8.4–10.5)
CHLORIDE SERPL-SCNC: 107 MMOL/L — SIGNIFICANT CHANGE UP (ref 96–108)
CO2 SERPL-SCNC: 21 MMOL/L — LOW (ref 22–31)
CREAT SERPL-MCNC: 0.74 MG/DL — SIGNIFICANT CHANGE UP (ref 0.5–1.3)
EGFR: 103 ML/MIN/1.73M2 — SIGNIFICANT CHANGE UP
EOSINOPHIL # BLD AUTO: 0.37 K/UL — SIGNIFICANT CHANGE UP (ref 0–0.5)
EOSINOPHIL NFR BLD AUTO: 4.2 % — SIGNIFICANT CHANGE UP (ref 0–6)
GLUCOSE SERPL-MCNC: 98 MG/DL — SIGNIFICANT CHANGE UP (ref 70–99)
HCG SERPL-ACNC: <2 MIU/ML — SIGNIFICANT CHANGE UP
HCT VFR BLD CALC: 41.4 % — SIGNIFICANT CHANGE UP (ref 34.5–45)
HGB BLD-MCNC: 13.2 G/DL — SIGNIFICANT CHANGE UP (ref 11.5–15.5)
IMM GRANULOCYTES NFR BLD AUTO: 0.3 % — SIGNIFICANT CHANGE UP (ref 0–0.9)
LYMPHOCYTES # BLD AUTO: 3.41 K/UL — HIGH (ref 1–3.3)
LYMPHOCYTES # BLD AUTO: 38.6 % — SIGNIFICANT CHANGE UP (ref 13–44)
MCHC RBC-ENTMCNC: 31.8 PG — SIGNIFICANT CHANGE UP (ref 27–34)
MCHC RBC-ENTMCNC: 31.9 GM/DL — LOW (ref 32–36)
MCV RBC AUTO: 99.8 FL — SIGNIFICANT CHANGE UP (ref 80–100)
MONOCYTES # BLD AUTO: 0.5 K/UL — SIGNIFICANT CHANGE UP (ref 0–0.9)
MONOCYTES NFR BLD AUTO: 5.7 % — SIGNIFICANT CHANGE UP (ref 2–14)
NEUTROPHILS # BLD AUTO: 4.43 K/UL — SIGNIFICANT CHANGE UP (ref 1.8–7.4)
NEUTROPHILS NFR BLD AUTO: 50.2 % — SIGNIFICANT CHANGE UP (ref 43–77)
NRBC # BLD: 0 /100 WBCS — SIGNIFICANT CHANGE UP (ref 0–0)
PLATELET # BLD AUTO: 348 K/UL — SIGNIFICANT CHANGE UP (ref 150–400)
POTASSIUM SERPL-MCNC: 4 MMOL/L — SIGNIFICANT CHANGE UP (ref 3.5–5.3)
POTASSIUM SERPL-SCNC: 4 MMOL/L — SIGNIFICANT CHANGE UP (ref 3.5–5.3)
PROT SERPL-MCNC: 7.6 G/DL — SIGNIFICANT CHANGE UP (ref 6–8.3)
RBC # BLD: 4.15 M/UL — SIGNIFICANT CHANGE UP (ref 3.8–5.2)
RBC # FLD: 13.7 % — SIGNIFICANT CHANGE UP (ref 10.3–14.5)
SODIUM SERPL-SCNC: 139 MMOL/L — SIGNIFICANT CHANGE UP (ref 135–145)
WBC # BLD: 8.83 K/UL — SIGNIFICANT CHANGE UP (ref 3.8–10.5)
WBC # FLD AUTO: 8.83 K/UL — SIGNIFICANT CHANGE UP (ref 3.8–10.5)

## 2023-01-27 PROCEDURE — 99285 EMERGENCY DEPT VISIT HI MDM: CPT

## 2023-01-27 PROCEDURE — 74177 CT ABD & PELVIS W/CONTRAST: CPT | Mod: 26,MA

## 2023-01-27 RX ORDER — IBUPROFEN 200 MG
600 TABLET ORAL ONCE
Refills: 0 | Status: COMPLETED | OUTPATIENT
Start: 2023-01-27 | End: 2023-01-27

## 2023-01-27 RX ORDER — MAGNESIUM HYDROXIDE 400 MG/1
30 TABLET, CHEWABLE ORAL ONCE
Refills: 0 | Status: COMPLETED | OUTPATIENT
Start: 2023-01-27 | End: 2023-01-27

## 2023-01-27 RX ORDER — POLYETHYLENE GLYCOL 3350 17 G/17G
17 POWDER, FOR SOLUTION ORAL ONCE
Refills: 0 | Status: COMPLETED | OUTPATIENT
Start: 2023-01-27 | End: 2023-01-27

## 2023-01-27 RX ADMIN — POLYETHYLENE GLYCOL 3350 17 GRAM(S): 17 POWDER, FOR SOLUTION ORAL at 23:58

## 2023-01-27 RX ADMIN — MAGNESIUM HYDROXIDE 30 MILLILITER(S): 400 TABLET, CHEWABLE ORAL at 23:58

## 2023-01-27 RX ADMIN — Medication 600 MILLIGRAM(S): at 18:57

## 2023-01-27 NOTE — ED PROVIDER NOTE - NSFOLLOWUPINSTRUCTIONS_ED_ALL_ED_FT
No signs of emergency medical condition on today's workup.  Presumptive diagnosis made, but further evaluation may be required by your primary care doctor or specialist for a definitive diagnosis.  Therefore, follow up as directed and if symptoms change/worsen or any emergency conditions, please return to the ER.    Abdominal Pain    Many things can cause abdominal pain. Many times, abdominal pain is not caused by a disease and will improve without treatment. Your health care provider will do a physical exam to determine if there is a dangerous cause of your pain; blood tests and imaging may help determine the cause of your pain. However, in many cases, no cause may be found and you may need further testing as an outpatient. Monitor your abdominal pain for any changes.     SEEK IMMEDIATE MEDICAL CARE IF YOU HAVE ANY OF THE FOLLOWING SYMPTOMS: worsening abdominal pain, uncontrollable vomiting, profuse diarrhea, inability to have bowel movements or pass gas, black or bloody stools, fever accompanying chest pain or back pain, or fainting. These symptoms may represent a serious problem that is an emergency. Do not wait to see if the symptoms will go away. Get medical help right away. Call 911 and do not drive yourself to the hospital.

## 2023-01-27 NOTE — ED ADULT NURSE REASSESSMENT NOTE - NS ED NURSE REASSESS COMMENT FT1
Report received from CATHY Colbert. Pt A&Ox4. Resting comfortably. Denies any complaints at this time. Does not appear to be in any acute distress. VS documented. Pending dispo.

## 2023-01-27 NOTE — ED PROVIDER NOTE - CONSTITUTIONAL, MLM
hide Well appearing, awake, alert, oriented to person, place, time/situation and in no apparent distress. normal...

## 2023-01-27 NOTE — ED PROVIDER NOTE - CLINICAL SUMMARY MEDICAL DECISION MAKING FREE TEXT BOX
42-year-old female status post gastric bypass 2021 in [place]  history of hypothyroidism second visit emergency room for constipation, no nausea, no vomiting had  thyroid function test 2 days ago in the ER was negative ,   we will obtain blood work, CT  scan of the abdomen, surgical consult and reassess ZR

## 2023-01-27 NOTE — ED ADULT NURSE NOTE - OBJECTIVE STATEMENT
42y female with hx of hypothyroid, gastric bypass 2021 presents to the ER c/o constipation. pt is alert and oriented x 4 and speaking coherently. pt reports 3 weeks of constipation. pt has tried Miralax, 2 bottles of mag citrate, 4 suppositories over the past week with minimal relief. pt reports "I feel bloated, I just don't feel like myself" c/o headache, nausea, bloating. pt abd soft, non tender. pt bradycardic, pt states that is her baseline. ekg in progress, md aware. blood work and CT completed by Qdoc. pt pending CTr at this time. Will reassess.

## 2023-01-27 NOTE — ED PROVIDER NOTE - PATIENT PORTAL LINK FT
You can access the FollowMyHealth Patient Portal offered by [facility] by registering at the following website: [email or web address]. By joining [employer]’s FollowMyHealth portal, you will also be able to view your health information using other applications (apps) compatible with our system.

## 2023-01-27 NOTE — ED PROVIDER NOTE - RAPID ASSESSMENT
42F w/ PSHx on  08/2021 gastric bypass presents to ED s/p no Sx of relief for taking medication prescribed from last ED visit for Pt alka tot the ED 2 days ago for constipation and decrease stool.  experiences discomfort with consistent passing gas. Pt is also feeling fatigue and having HA.    Patient was rapidly assessed via a rapid medical evaluation and/or role of Quick Triage Doctor; a limited history, physical exam and assessment was performed. The patient will be seen and further evaluated in the main emergency department. The remainder of care and evaluation will be conducted by the primary emergency medicine team. Receiving team will follow up on labs, imaging and serially reassess patient as indicated. All further decisions regarding patient care, evaluation and disposition are at the discretion of the receiving primary emergency department team. Seen by Rupali Rand) and Zhanna Natarajan).

## 2023-01-27 NOTE — ED PROVIDER NOTE - OBJECTIVE STATEMENT
42-year-old female status post gastric bypass 1991 in [place] presented this constipation for the last couple of weeks, seen by [place] ER 2 days ago  had normal l blood work never mentioned that she had gastric bypass, no  CT scan was done, she continued to take pills for constipation using glycerin suppository without any success,  she feels bloated denies nausea or vomiting  fever or chills   ,she  had root  canal  done last week ,was taking a lot of pain meds ,now asked something for the pain

## 2023-01-27 NOTE — ED PROVIDER NOTE - PROGRESS NOTE DETAILS
surgery called case discussed will see pt in ER Phil Leavitt MD, PGY1  Nothing to do, as per surgery. Recommending milk of mag, miralax, enema. CT scan normal. Pt refusing enema. Will self enema at home. Pt requesting dc home. Discussed with surgical team who are ok with dc. Pt in agreement with plan. Will take miralax daily. Gave return precautions and answered all question.

## 2023-01-27 NOTE — ED ADULT TRIAGE NOTE - CHIEF COMPLAINT QUOTE
here for constipation, was seen here few days for the same complain and was dc with prescription and patient tried everything under the sun but still remains constipated, had a very small bowel movement today.

## 2023-01-28 VITALS
OXYGEN SATURATION: 100 % | DIASTOLIC BLOOD PRESSURE: 65 MMHG | SYSTOLIC BLOOD PRESSURE: 100 MMHG | RESPIRATION RATE: 15 BRPM | HEART RATE: 47 BPM | TEMPERATURE: 98 F

## 2023-01-28 PROCEDURE — 99285 EMERGENCY DEPT VISIT HI MDM: CPT | Mod: 25

## 2023-01-28 PROCEDURE — 93005 ELECTROCARDIOGRAM TRACING: CPT

## 2023-01-28 PROCEDURE — 74177 CT ABD & PELVIS W/CONTRAST: CPT | Mod: MA

## 2023-01-28 PROCEDURE — 84702 CHORIONIC GONADOTROPIN TEST: CPT

## 2023-01-28 PROCEDURE — 36415 COLL VENOUS BLD VENIPUNCTURE: CPT

## 2023-01-28 PROCEDURE — 85025 COMPLETE CBC W/AUTO DIFF WBC: CPT

## 2023-01-28 PROCEDURE — 80053 COMPREHEN METABOLIC PANEL: CPT

## 2023-01-28 RX ADMIN — Medication 1 ENEMA: at 00:27

## 2023-01-28 NOTE — CONSULT NOTE ADULT - SUBJECTIVE AND OBJECTIVE BOX
CC: 42y old Female admitted with a chief complaint of constipation, now    HPI: 42F hx sleeve gastrectomy in  in [place] p/w 3 weeks of constipation. Patient has taken suppositories, enemas, juice cleanse, metamucil, and miralax without passing significant stool. She presented to the ED 2 days ago for the same issue, was discharged with a prescription for milk of magnesia, but was unable to pick it up as it was backordered. Patient did not mention her history of sleeve gastrectomy 2 days ago, thought that it may be contributing to the constipation, so she represented to provide additional pertinent history. Patient has not been having any issues with the sleeve, tolerating small meals, no N/V, and has lost around 50 lbs since the surgery.    PMHx:   Asthma  Hypothyroid  Asthma  Atrophy of kidney  Hypertension      PSHx:   H/O breast augmentation  History of   H/O: sleeve gastrectomy       Medications (inpatient):   Medications (PRN):  Allergies: No Known Allergies  (Intolerances: )  Social Hx:   Family Hx: FH: CAD (coronary artery disease) (Mother)        Physical Exam  T(C): 36.9  HR: 47 (43 - 48)  BP: 100/65 (100/65 - 135/86)  RR: 15 (15 - 18)  SpO2: 100% (98% - 100%)  Tmax: T(C): , Max: 37.1 (23 @ 20:02)    General: well developed, well nourished, NAD  Neuro: alert and oriented, no focal deficits, moves all extremities spontaneously  HEENT: NCAT, EOMI, anicteric, mucosa moist  Respiratory: airway patent, respirations unlabored  CVS: regular rate and rhythm  Abdomen: soft, nontender, nondistended  Extremities: no edema, sensation and movement grossly intact  Skin: warm, dry, appropriate color    Labs:                        13.2   8.83  )-----------( 348      ( 2023 16:51 )             41.4           139  |  107  |  11  ----------------------------<  98  4.0   |  21<L>  |  0.74    Ca    9.4      2023 16:51    TPro  7.6  /  Alb  4.5  /  TBili  0.4  /  DBili  x   /  AST  14  /  ALT  11  /  AlkPhos  51              Imaging and other studies:    < from: CT Abdomen and Pelvis w/ Oral Cont and w/ IV Cont (23 @ 18:21) >  FINDINGS:  LOWER CHEST: Within normal limits. Bilateral breast implants partially   visualized.    LIVER: Within normal limits.  BILE DUCTS: Normal caliber.  GALLBLADDER: Within normal limits.  SPLEEN: Within normal limits.  PANCREAS: Within normal limits.  ADRENALS: Within normal limits.  KIDNEYS/URETERS: Severely atrophic right kidney with compensatory   hypertrophy of the left kidney. No left hydronephrosis.    BLADDER: Within normal limits.  REPRODUCTIVE ORGANS: Uterus and adnexa within normal limits.    BOWEL: No bowel obstruction. Postsurgical changes of sleeve gastrectomy.   Normal appendix. Moderate colonic stool burden.  PERITONEUM: Trace pelvic free fluid, likely physiologic.  VESSELS: Within normal limits.  RETROPERITONEUM/LYMPH NODES: No lymphadenopathy.  ABDOMINAL WALL: Within normal limits.  BONES: Within normal limits.    IMPRESSION:  No acute CT findings. No bowel obstruction or inflammation.    < end of copied text >    
n/a

## 2023-01-28 NOTE — CONSULT NOTE ADULT - ATTENDING COMMENTS
Unable to see patient before discharge from ED.  Pt with constipation s/p bariatric surgery at [facility].  Recommend bowel regimen.  Follow up with [facility] as scheduled.    Marilyn Moore MD

## 2023-01-28 NOTE — CONSULT NOTE ADULT - ASSESSMENT
42F with hx sleeve gastrectomy in 2021 p/w constipation. CT with no acute findings and moderate stool burden in colon.    -no acute surgical intervention  -recc enema and milk of magnesia in the ED  -patient should take Miralax daily for a consistent bowel regimen  -can follow up with her bariatric surgeon outpatient    discussed with vascular fellow Dr. Jose Carlos Padilla PGY3  green surgery  [phone]

## 2023-03-28 NOTE — H&P PST ADULT - BP NONINVASIVE SYSTOLIC (MM HG)
Acute visit    Problem List Items Addressed This Visit          Nervous    Acute post-operative pain - Primary      hx of recent gastric perforation status post repair, history of diverticulitis  - c/w fentaNYL Transdermal Patch 72 Hour 12 MCG/HR  - c/w oxycodone 5 mg PO every 6 hours as needed            Digestive    Diverticulitis of colon    Intra-abdominal abscess (CMS/HCC)     Status post IR drain placement with ABDI drain; drain care as ordered  - leukocytosis stable continue to monitor  - per chart reviewed; CT A/P with resolution/decreased in fluid collection.  - If leukocytosis persistent, will check UA; trend wbc/temps for now given patient is known case abdominal abscess and gastrocutaneous fistula  - c/w Piperacillin Sodium-Tazobactam Sodium) 3 GM EVERY 6 HOURS  -resumed home med per discharge summary reconcile medications.  - OP follow up with surgery and ID  -Patient's wife requested palliative care following for pain control. Order placed a  -Lab Test(s): cbc, cmp, crp weekly while on zosyn (Fax Results To: facility attending and Dr Vasques @ [phone]  - Continue monitor         Gastrocutaneous fistula     Status post IR drain placement with ABDI drain; drain care as ordered  - leukocytosis stable continue to monitor  - per chart reviewed; CT A/P with resolution/decreased in fluid collection.  - If leukocytosis persistent, will check UA; trend wbc/temps for now given patient is known case abdominal abscess and gastrocutaneous fistula  - c/w Piperacillin Sodium-Tazobactam Sodium) 3 GM EVERY 6 HOURS  -resumed home med per discharge summary reconcile medications.  - OP follow up with surgery and ID  -Patient's wife requested palliative care following for pain control. Order placed a  -Lab Test(s): cbc, cmp, crp weekly while on zosyn (Fax Results To: facility attending and Dr Vasques @ [phone]  - Continue monitor            Genitourinary    CINDI (acute kidney injury) (CMS/HCC)     x of Fluid overload  and Bilateral pleural effusions  -continue PO Lasix, wean O2 TO MAINTAIN O2SAT >90%            Hematologic    Elevated WBC count     leukocytosis stable continue to monitor  - per chart reviewed; CT A/P with resolution/decreased in fluid collection.  - If leukocytosis persistent, will check UA; trend wbc/temps for now given patient is known case abdominal abscess and gastrocutaneous fistula  - c/w Piperacillin Sodium-Tazobactam Sodium) 3 GM EVERY 6 HOURS  -resumed home med per discharge summary reconcile medications.  - OP follow up with surgery and ID            Other    Physical deconditioning     OT/PT         On total parenteral nutrition (TPN)     C/w NPO TPN         Recurrent major depressive disorder, in partial remission (CMS/HCC)     C/w citalopram                  #Physical deconditioning  - C/W PT/OT as tolerated  #Leukocytosis and fever; 2/2 abdominal abscess on ATB therapy. Will trend cbc weekly. Tylenol as needed  #Abdominal abscesses  #Gastrocutaneous fistula  #Status post IR drain placement with ABDI drain; drain care as ordered  - leukocytosis stable continue to monitor  - per chart reviewed; CT A/P with resolution/decreased in fluid collection.  - If leukocytosis persistent, will check UA; trend wbc/temps for now given patient is known case abdominal abscess and gastrocutaneous fistula  - c/w Piperacillin Sodium-Tazobactam Sodium) 3 GM EVERY 6 HOURS  -resumed home med per discharge summary reconcile medications.  - OP follow up with surgery and ID  -Patient's wife requested palliative care following for pain control. Order placed a  -Lab Test(s): cbc, cmp, crp weekly while on zosyn (Fax Results To: facility attending and Dr Vasques @ [phone]  - Continue monitor  #Abdominal pain  - hx of recent gastric perforation status post repair, history of diverticulitis  - c/w fentaNYL Transdermal Patch 72 Hour 12 MCG/HR  - c/w oxycodone 5 mg PO every 6 hours as needed  - c/w Tylenol as needed  #GERD  - C/W  Pantoprazole 40 MG po DAILY  - C/W Zofran as needed for N/V  #CINDI  - hx of Fluid overload and Bilateral pleural effusions  -continue PO Lasix, wean O2 TO MAINTAIN O2SAT >90%  #Malnutrition  - NPO status  - Continue TPN  #moisture associated dermatitis on buttock  - c/w Triad cream (or A&D ointment) in dime thickness daily to  irritated areas.  #HLD  - c/w Atorvastatin 10 mg PO daily  #Depression  - c/w Citalopram 10 mg PO daily  -reviewed of hospital record via EMR and reconciled medication in PCC and EMR (d/c summary). Reviewed orders, medications, records, and pertinent labs/x-rays from [facility]. Reviewed VS, BS, O2, etc as per protocol.       Chief Complaint  This is an acute visit for leucocytosis and pain    History of Present Illness  A 81-year-old man with history of recent gastric perforation status post repair, history of diverticulitis, BPH, HLD, presented from acute rehab with increasing abdominal pain, N/V, CT A/P positive for abdominal fluid collections compatible with abscesses, gastrocutaneous fistula. Patient admitted, started on IV Zosyn, IR guided drains placed-culture showed mixed anaerobes/aerobes, beta-lactamase producers. PICC line placed and started on TPN to allow for bowel rest. Repeat CT with stable/decreased fluid collections, drains in proper position. Patient d/c to  for rehab and continue TPN.  On encounter; per nursing staff reported pain   Active Problems  Problems  · Abnormal fasting glucose (790.29) (R73.01)  · Abnormality of lung on chest x-ray (793.19) (R91.8)  · Acute post-operative pain (338.18) (G89.18)  · Age-related nuclear cataract of left eye (366.16) (H25.12)  · Age-related nuclear cataract of right eye (366.16) (H25.11)  · Anemia, unspecified type (285.9) (D64.9)  · Arthritis of shoulder region, right (716.91) (M19.011)  · At risk for constipation (V49.89) (Z91.89)  · Bilateral shoulderpain (719.41) (M25.511,M25.512)  · Bladder cancer (188.9) (C67.9)  · Bladder cancer  (188.9) (C67.9)  · Carpal tunnel syndrome, bilateral upper limbs (354.0) (G56.03)  · Chest pain (786.50) (R07.9)  · 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)  · Chronic pain of left lower extremity (729.5,338.29) (M79.605,G89.29)  · Combined form of age-related cataract, both eyes (366.19) (H25.813)  · Cortical age-related cataract of left eye (366.15) (H25.012)  · Cortical age-related cataract of right eye (366.15) (H25.011)  · Cyst, kidney, acquired (593.2) (N28.1)  · Dermatochalasis (374.87) (H02.839)  · Diverticulitis of colon (562.11) (K57.32)  · Dizziness (780.4) (R42)  · Drusen of right macula (362.57) (H35.361)  · EKG, abnormal (794.31) (R94.31)  · Elevated alkaline phosphatase level (790.5) (R74.8)  · Elevated glucose (790.29) (R73.09)  · Encounter for prostate cancer screening (V76.44) (Z12.5)  · Essential familial hypercholesterolemia (272.0) (E78.01)  · Fatigue (780.79) (R53.83)  ·Femur fracture, left (821.00) (S72.92XA)  · Fracture of shaft of right femur with routine healing (V54.15) (S72.301D)  · Groin pain (789.09) (R10.30)  · Hip pain, bilateral (719.45) (M25.551,M25.552)  · Hyperlipidemia (272.4) (E78.5)  · Iliotibial band tendinitis of right side (728.89) (M76.31)  · Knee pain (719.46) (M25.569)  · Left shoulder pain, unspecified chronicity (719.41) (M25.512)  · Leg pain (729.5) (M79.606)  · Leg pain (729.5) (M79.606)  · Limb pain (729.5) (M79.609)  · Liver cyst (573.8) (K76.89)  · Lower back pain (724.2) (M54.5)  · Lower back pain (724.2) (M54.50)  · Lumbar neuritis (724.4) (M54.16)  · Mixed type age-related cataract, left eye (366.19) (H25.812)  · Mixed type age-related cataract, right eye (366.19) (H25.811)  · Myopia with presbyopia (367.1,367.4) (H52.10,H52.4)  · Need for prophylactic antibiotic (V58.62) (Z79.2)  · Neuropathy (355.9) (G62.9)  · Numbness and tingling of hand (782.0) (R20.0,R20.2)  · Osteoporosis (733.00) (M81.0)  · Overweight with body mass index (BMI) of 28 to 28.9 in  adult (278.02,V85.24)  (E66.3,Z68.28)  · Pain of left lower extremity (729.5) (M79.605)  · Pain of lower leg (729.5) (M79.669)  · Renal cyst (753.10) (N28.1)  · Right shoulder pain, unspecified chronicity (719.41)(M25.511)  · Shoulder arthritis (716.91) (M19.019)  · SOB (shortness of breath) (786.05) (R06.02)  · 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)  · Status post total replacement of right shoulder (V43.61) (Z96.611)  · Tubular adenoma of colon (211.3) (D12.6)  · Vitreous floaters (379.24) (H43.399)  Past Medical History  Problems  · History of hyperlipidemia (V12.29) (Z86.39)  · History of malignant neoplasm of bladder (V10.51) (Z85.51)  · History of shortness of breath (V13.89) (Z87.898)  · Resolved Date: 22 Feb 2021  · History of Myalgia and myositis (729.1)  Surgical History  Problems  · History of Bladder Surgery  · History of Shoulder replacement  · History of Tonsillectomy With Adenoidectomy  · History of Total Hip Replacement  Family History  Mother  · Family history of Breast Cancer (V16.3)  Social History  Problems  · Being A Social Drinker  · Caffeine Use  · Exercising Regularly  · Former smoker (V15.82) (Z87.891)  · Marital History - Currently   · Non-smoker (V49.89) (Z78.9)  Allergies  Medication  · No Known Drug Allergies  Recorded By: Meenu Olsen; 12/11/2013 10:25:31 AM  Vitals    9 118 / 59 mmHg Sitting r/arm  11:29 66 bpm Regular   16 Breaths/min  99.0 mg/dL  11:29 93.0 % Oxygen via Nasal Cannula  Physical Exam  Physical exam  Constitutional: Patient is asleep, resting quietly in bed but does arouse to verbal stimuli, and A&Ox 1 (can state name), afebrile, not in acute distress  Eyes: clear sclera  ENMT: mucous membranes moist  Head/Neck: neck supple, trachea midline, no JVD  Respiratory/Thorax: CTAB, no rales/rhonchi/wheezing  Cardiovascular: RRR, no rubs/murmurs/gallops  Gastrointestinal: +BS x4, soft, nt/nd/ng/nr. Drain intact  Musculoskeletal:  MCLEOD  Extremities: no edema, no cellulitis  Neurological: A&O x1, sleepy but eyes open with verbal  Psychological: Appropriate mood and behavior  Skin: warm and dry,intact  Results/Data  Complete Blood Count + Differential 01Mar2023 09:52AM Aura Coley  Test Name Result Flag Reference  White Blood Cell Count 16.1 x10E9/L H 4.4 - 11.3  Red Blood Cell Count 2.78 x10E12/L L See Below  Reference Range: 4.50 - 5.90  Hemoglobin 7.6 g/dL L See Below  Reference Range: 13.5 - 17.5  HCT 24.4 % L See Below  Reference Range: 41.0 - 52.0  MCV 88 fL 80 - 100  MCHC 31.1 g/dL L See Below  Reference Range: 32.0 - 36.0  Platelet Count 507 x10E9/L H 150 - 450  RDW-CV 14.3 % See Below  Reference Range: 11.5 - 14.5  Neutrophil % 79.3 % See Below  Reference Range: 40.0 - 80.0  % Automated Immature Gran 1.0 % H 0.0 - 0.9  Immature Granulocyte Count (IG) includes promyelocytes,  myelocytes and metamyelocytes but does notinclude bands.  Percent differential counts (%) should be interpreted in the  context of the absolute cell counts (cells/L).  Lymphocyte % 8.1 % See Below  Reference Range: 13.0 - 44.0  Monocyte % 9.1 % 2.0 - 10.0  Eosinophil % 2.1 % 0.0 - 6.0  Basophil % 0.4 % 0.0 - 2.0  Neutrophil Count 12.76 x10E9/L H See Below  Reference Range: 1.60 - 5.50  Lymphocyte Count 1.30 x10E9/L See Below  Reference Range: 0.80 - 3.00  Monocyte Count 1.46 x10E9/L H See Below  Reference Range: 0.05 - 0.80  Eosinophil Count 0.34 x10E9/L See Below  Reference Range: 0.00 - 0.40  Basophil Count 0.07 x10E9/L See Below  Reference Range: 0.00 - 0.10  Magnesium, Serum 01Mar2023 09:52AM Silvio Dc  Test Name Result Flag Reference  Magnesium, Serum 1.90 mg/dL See Below  Reference Range: 1.60 - 2.40  Renal Function Panel [id number] 09:52AM Silvio Dc  Test Name Result Flag Reference  Glucose, Serum 107 mg/dL H 74 - 99  Sodium, Serum 134 mmol/L L 136 - 145  POTASSIUM 4.0 mmol/L 3.5 - 5.3  Chloride, Serum 99 mmol/L 98 - 107  Bicarbonate, Serum 27  mmol/L 21 - 32  Anion Gap, Serum 12 mmol/L 10 - 20  Blood Urea Nitrogen, Serum 33 mg/dL H 6 - 23  CREATININE 1.44 mg/dL H See Below  Reference Range: 0.50 - 1.30  GFR MALE 48 mL/min/1.73m2 A >90  CALCULATIONS OF ESTIMATED GFR ARE PERFORMED  USING THE 2021 CKD-EPI STUDY REFIT EQUATION  WITHOUT THE RACE VARIABLE FOR THE IDMS-TRACEABLE  CREATININE METHODS.  https://jasn.asnjournals.org/content/early/2021/09/22/ASN.6895647468  Calcium, Serum 8.0 mg/dL L 8.6 - 10.3  Phosphorus, Serum 3.0 mg/dL 2.5 - 4.9  The performance characteristics of phosphorus testing in  heparinized plasma have been validated by the individual   laboratory site where testing is performed. Testing  on heparinized plasma is not approved bythe FDA;  however, such approval is not necessary.  Albumin, Serum 2.2 g/dL L 3.4 - 5.0       124

## 2023-10-31 ENCOUNTER — EMERGENCY (EMERGENCY)
Facility: HOSPITAL | Age: 43
LOS: 1 days | Discharge: HOME | End: 2023-10-31
Attending: EMERGENCY MEDICINE
Payer: COMMERCIAL

## 2023-10-31 VITALS
SYSTOLIC BLOOD PRESSURE: 153 MMHG | TEMPERATURE: 98 F | DIASTOLIC BLOOD PRESSURE: 90 MMHG | HEIGHT: 67 IN | WEIGHT: 149.91 LBS | HEART RATE: 70 BPM | OXYGEN SATURATION: 97 % | RESPIRATION RATE: 20 BRPM

## 2023-10-31 DIAGNOSIS — Z98.891 HISTORY OF UTERINE SCAR FROM PREVIOUS SURGERY: Chronic | ICD-10-CM

## 2023-10-31 DIAGNOSIS — Z98.82 BREAST IMPLANT STATUS: Chronic | ICD-10-CM

## 2023-10-31 DIAGNOSIS — Z98.89 OTHER SPECIFIED POSTPROCEDURAL STATES: Chronic | ICD-10-CM

## 2023-10-31 PROCEDURE — 99284 EMERGENCY DEPT VISIT MOD MDM: CPT

## 2023-10-31 NOTE — ED ADULT TRIAGE NOTE - CHIEF COMPLAINT QUOTE
Pt c/o "throat pain, cough, tired since last night. seen at urgent care today and given steroids and zithromax"

## 2023-11-01 VITALS
DIASTOLIC BLOOD PRESSURE: 79 MMHG | TEMPERATURE: 98 F | HEART RATE: 67 BPM | SYSTOLIC BLOOD PRESSURE: 127 MMHG | RESPIRATION RATE: 20 BRPM | OXYGEN SATURATION: 98 %

## 2023-11-01 PROCEDURE — 71046 X-RAY EXAM CHEST 2 VIEWS: CPT

## 2023-11-01 PROCEDURE — 99285 EMERGENCY DEPT VISIT HI MDM: CPT | Mod: 25

## 2023-11-01 PROCEDURE — 96372 THER/PROPH/DIAG INJ SC/IM: CPT

## 2023-11-01 PROCEDURE — 94640 AIRWAY INHALATION TREATMENT: CPT

## 2023-11-01 PROCEDURE — 71046 X-RAY EXAM CHEST 2 VIEWS: CPT | Mod: 26

## 2023-11-01 RX ORDER — LIDOCAINE 4 G/100G
15 CREAM TOPICAL ONCE
Refills: 0 | Status: COMPLETED | OUTPATIENT
Start: 2023-11-01 | End: 2023-11-01

## 2023-11-01 RX ORDER — IPRATROPIUM/ALBUTEROL SULFATE 18-103MCG
3 AEROSOL WITH ADAPTER (GRAM) INHALATION ONCE
Refills: 0 | Status: COMPLETED | OUTPATIENT
Start: 2023-11-01 | End: 2023-11-01

## 2023-11-01 RX ORDER — ACETAMINOPHEN 500 MG
1000 TABLET ORAL ONCE
Refills: 0 | Status: DISCONTINUED | OUTPATIENT
Start: 2023-11-01 | End: 2023-11-01

## 2023-11-01 RX ORDER — KETOROLAC TROMETHAMINE 30 MG/ML
15 SYRINGE (ML) INJECTION ONCE
Refills: 0 | Status: DISCONTINUED | OUTPATIENT
Start: 2023-11-01 | End: 2023-11-01

## 2023-11-01 RX ADMIN — Medication 3 MILLILITER(S): at 04:19

## 2023-11-01 RX ADMIN — LIDOCAINE 15 MILLILITER(S): 4 CREAM TOPICAL at 03:56

## 2023-11-01 RX ADMIN — Medication 15 MILLIGRAM(S): at 03:56

## 2023-11-01 RX ADMIN — Medication 3 MILLILITER(S): at 03:56

## 2023-11-01 RX ADMIN — Medication 3 MILLILITER(S): at 04:32

## 2023-11-01 NOTE — ED PROVIDER NOTE - PHYSICAL EXAMINATION
Gen: AAOx3, fatigued appearing, clearly uncomfortable  HEENT: NCAT, normal conjunctiva, oral mucosa moist, no facial swelling or angioedema  Lung: speaking in full sentences, good aeration bilaterally, rhonchi BL but no wheezes, pt sats ranging between %  CV: regular rate and rhythm. cap refill <2x. peripheral pulses 2+bilaterally   Abd: soft, ND, NT  MSK: no visible deformities  Neuro: No focal deficits  Skin: Intact  Psych: normal affect

## 2023-11-01 NOTE — ED PROVIDER NOTE - NSFOLLOWUPINSTRUCTIONS_ED_ALL_ED_FT
1. You presented to the emergency department for: cough, sore throat, difficulty breathing.    2. Your evaluation in the emergency department included a physician evaluation. Your work-up did not reveal any findings indicating the need for admission to the hospital or any emergent interventions at this time.     3. It is recommended that you follow-up with your primary care provider in 1-2 weeks for a repeat evaluation, and potentially further testing and treatment.     If needed, to arrange an appointment with a primary care provider please call: [phone]    4. Please continue taking your regular medications as prescribed.     For sore throat, please find over the counter lidocaine throat spray. Follow directions on the label.     For pain you may take 400-600 mg IBUPROFEN or 500-1000mg ACETAMINOPHEN every 6-8 hours - as needed.  This is an over-the-counter medication - please read the instructions for use and warnings on the label. If you have any questions regarding its use, you may refer them to your local pharmacist.    5. PLEASE RETURN TO THE EMERGENCY DEPARTMENT IMMEDIATELY IF you develop any fevers not responding to over the counter medications, uncontrollable nausea and vomiting, an inability to tolerate eating and drinking, difficulty breathing, chest pain, a severe increase in your symptoms or pain, or any other new symptoms that concern you.

## 2023-11-01 NOTE — ED PROVIDER NOTE - PROGRESS NOTE DETAILS
Pt reassessed and she reports she is feeling much better after treatment. Pt appears more comfortable and her lung sounds are less coarse. Pt requesting refill of her albuterol inhaler and to go home. Pt clinically ready to return home. Will give return precautions

## 2023-11-01 NOTE — ED PROVIDER NOTE - CLINICAL SUMMARY MEDICAL DECISION MAKING FREE TEXT BOX
Airway intact, pt does not appear toxic, simply very uncomfortable with her symptoms. Likely a viral URI and not a true asthma exacerbation, will give Duonebs x 3 as patient is requesting it and it may provide relief. Obtain chest x-ray to rule out PNA, although this is less likely.  No labs. Viral swab. We will also give lidocaine viscus oral solution for sore throat and IM Toradol for myalgias/sore throat relief. Reassess.

## 2023-11-01 NOTE — ED ADULT NURSE NOTE - NSFALLUNIVINTERV_ED_ALL_ED
Bed/Stretcher in lowest position, wheels locked, appropriate side rails in place/Call bell, personal items and telephone in reach/Instruct patient to call for assistance before getting out of bed/chair/stretcher/Non-slip footwear applied when patient is off stretcher/[place] to call system/Physically safe environment - no spills, clutter or unnecessary equipment/Purposeful proactive rounding/Room/bathroom lighting operational, light cord in reach

## 2023-11-01 NOTE — ED PROVIDER NOTE - ATTENDING CONTRIBUTION TO CARE
Patient's clinical picture most consistent with viral URI leading to pharyngitis and asthma exacerbation.  X-ray today shows no pneumonia, clinically she does not appear systemically ill, her oropharynx is clear without any findings that would be concerning for PTA or RPA.  patient's respiratory status improved after getting DuoNebs.  Patient already on prednisone taper, day 2 of 5.  Given improved clinical picture, patient appears stable for discharge with continued supportive care for asthma exacerbation, pharyngitis, viral URI.  Patient given return precautions if symptoms worsen.  Clinical picture at this time not concerning for ACS, PE.

## 2023-11-01 NOTE — ED PROVIDER NOTE - OBJECTIVE STATEMENT
43-year-old female with PMH of asthma, hypothyroidism presents to the ED with complaints of sore throat, cough, tight throat since this morning.  Patient states that she woke up with a severely sore throat and it was painful to swallow. Reports subjective fever and took a tylenol at 7am (none since then). Cough is nonproductive.  Patient went to  where she received a Zithromax and prednisone 20 for symptoms, but the patient states that she does not feel better.  Pt also attempted her albuterol inhaler with no relief. Patient states that this does not feel like a typical asthma exacerbation for her, but that she is fearful that because her cough and throat hurts so bad it feels like her swelling throat is swelling up.  Patient also endorses myalgias and fatigue.  Patient denies chest pain, abdominal pain, nausea, vomiting, diarrhea, sick contacts, recent travel.

## 2023-11-01 NOTE — ED ADULT NURSE NOTE - OBJECTIVE STATEMENT
43y female w/ pmh of asthma presents to ED w/ cough and throat pain since yesterday. Pt states she woke up yesterday with a cough and feeling tightness in her chest when she breathes. Pt states she went to urgent care where they gave her predisone pills which have not helped. Pt states her inhaler has not provided relief either. Pt able to speak in full sentences, no apparent distress. Pt denies fever, chills, chest pain, nausea, vomiting, diarrhea. Pt is ambulatory.

## 2023-11-01 NOTE — ED PROVIDER NOTE - PATIENT PORTAL LINK FT
You can access the FollowMyHealth Patient Portal offered by [facility] by registering at the following website: [email or web address]. By joining [facility] FollowMyHealth portal, you will also be able to view your health information using other applications (apps) compatible with our system.

## 2024-01-27 ENCOUNTER — NON-APPOINTMENT (OUTPATIENT)
Age: 44
End: 2024-01-27

## 2024-05-03 NOTE — ED ADULT NURSE NOTE - NS_NURSE_DISC_TEACHING_YN_ED_ALL_ED
Dr. Reyes updated on pt lab results, last few BP's, and pt requesting to go home. Per Dr. Reyes ok for discharge.    Yes

## 2024-09-20 ENCOUNTER — NON-APPOINTMENT (OUTPATIENT)
Age: 44
End: 2024-09-20

## 2024-10-23 NOTE — ED PROVIDER NOTE - MUSCULOSKELETAL, MLM
Ceci Salamanca, RN Sent to [facility] Surg Sched Aaron - Shun, C1 hour ago (12:24 PM)    Comfort, please call pt's  to scheduled (cell ending in [phone]) thanks!  Ceci Salamanca, RN1 hour ago (12:23 PM)    SURGERY SCHEDULING REQUIREMENTS INCLUDE:  Facility: [facility]  Admission Type: Day Surgery   Time Needed: 30 minutes  Rep Required: NO  Anesthesia: IV Sedation  Preop Required (MILD, SCS perm): No  MRSA Swab Required: No  NPO: Yes   Procedure: Right C3-4, C4-5 RFA  Dx/CPT CODE: 64253, 81008  M47.812   Anticoagulation:   Is the patient on Coumadin/Warfarin, Lovenox, Plavix, or Aspirin/Excedrin?   You do not need to stop ASPIRIN for your procedure.   NSAIDS (OTC products)? - Hold for 24 hours     Does patient take any GLP-1 inhibitors/agonists? NO   INR Check: No  Platelets WNL?   PLT (K/mcL)  Date  Value  07/25/2022  261      Sleep Apnea: No  Latex Allergy: No  Diabetic: No Insulin pump or glucose monitor (N/A for PNS procedure): No  Pacemaker: No  Heart/Lung Issues: SVT, s/p ablation 2006  (if yes need to verify approval for anesthesia with MD)  Stroke/MI in the past 6 months: No  Estimated body mass index is 24.79 kg/m² as calculated from the following:    Height as of 10/10/24: 5' 5\" (1.651 m).    Weight as of 10/10/24: 67.6 kg (149 lb).  Special Instructions: Under Fluoroscopy      Follow up: Nadya 4-6 weeks   Spine appears normal, range of motion is not limited, no muscle or joint tenderness

## 2025-03-17 ENCOUNTER — NON-APPOINTMENT (OUTPATIENT)
Age: 45
End: 2025-03-17

## 2025-03-19 ENCOUNTER — EMERGENCY (EMERGENCY)
Facility: HOSPITAL | Age: 45
LOS: 1 days | Discharge: HOME | End: 2025-03-19
Attending: EMERGENCY MEDICINE
Payer: COMMERCIAL

## 2025-03-19 VITALS
WEIGHT: 160.06 LBS | RESPIRATION RATE: 20 BRPM | OXYGEN SATURATION: 99 % | DIASTOLIC BLOOD PRESSURE: 79 MMHG | HEART RATE: 52 BPM | SYSTOLIC BLOOD PRESSURE: 116 MMHG | HEIGHT: 67 IN | TEMPERATURE: 97 F

## 2025-03-19 DIAGNOSIS — Z98.82 BREAST IMPLANT STATUS: Chronic | ICD-10-CM

## 2025-03-19 DIAGNOSIS — Z98.891 HISTORY OF UTERINE SCAR FROM PREVIOUS SURGERY: Chronic | ICD-10-CM

## 2025-03-19 DIAGNOSIS — Z98.89 OTHER SPECIFIED POSTPROCEDURAL STATES: Chronic | ICD-10-CM

## 2025-03-19 PROCEDURE — 99284 EMERGENCY DEPT VISIT MOD MDM: CPT

## 2025-03-19 PROCEDURE — 99282 EMERGENCY DEPT VISIT SF MDM: CPT

## 2025-03-19 RX ORDER — DIAZEPAM 2 MG/1
1 TABLET ORAL
Qty: 9 | Refills: 0
Start: 2025-03-19 | End: 2025-03-21

## 2025-03-19 RX ORDER — LIDOCAINE HYDROCHLORIDE 20 MG/ML
1 JELLY TOPICAL ONCE
Refills: 0 | Status: COMPLETED | OUTPATIENT
Start: 2025-03-19 | End: 2025-03-19

## 2025-03-19 RX ORDER — ACETAMINOPHEN 500 MG/5ML
975 LIQUID (ML) ORAL ONCE
Refills: 0 | Status: COMPLETED | OUTPATIENT
Start: 2025-03-19 | End: 2025-03-19

## 2025-03-19 RX ADMIN — LIDOCAINE HYDROCHLORIDE 1 PATCH: 20 JELLY TOPICAL at 09:15

## 2025-03-19 RX ADMIN — Medication 975 MILLIGRAM(S): at 09:15

## 2025-03-19 NOTE — ED PROVIDER NOTE - CLINICAL SUMMARY MEDICAL DECISION MAKING FREE TEXT BOX
As per H&P differential diagnosis includes but not limited to likely trapezius strain/muscle spasm consider other muscle strain in the thoracic or cervical region.  Clinical picture not consistent with orthopedic or neurosurgical emergency vertebral artery dissection, vascular catastrophe, high-grade radiculopathy, spinal cord compression / cauda equina, spinal epidural abscess, meningitis, anterior/central cord, varicella-zoster virus, septic arthritis, PE, pneumothorax.  Plan for pain control, reassessment, likely discharge with outpatient follow-up, analgesia, relative rest, physical therapy.  Extensively counseled the patient on findings, likely diagnosis and plan. -De Torre PA-C As per H&P differential diagnosis includes but not limited to likely trapezius strain/muscle spasm consider other muscle strain in the thoracic or cervical region.  Clinical picture not consistent with orthopedic or neurosurgical emergency vertebral artery dissection, vascular catastrophe, high-grade radiculopathy, spinal cord compression / cauda equina, spinal epidural abscess, meningitis, anterior/central cord, varicella-zoster virus, septic arthritis, PE, pneumothorax.  Plan for pain control, reassessment, likely discharge with outpatient follow-up, analgesia, relative rest, physical therapy.  Extensively counseled the patient on findings, likely diagnosis and plan. -De Torre PA-C    see attending attestation authored by me, Adrian Berry MD, for further MDM details.

## 2025-03-19 NOTE — ED ADULT NURSE NOTE - TEMPLATE LIST FOR HEAD TO TOE ASSESSMENT
headaches. Psychiatric/Behavioral: Negative for hallucinations. Objective:   /78 (Site: Left Upper Arm, Position: Sitting, Cuff Size: Large Adult)   Pulse 61   Temp 97.8 °F (36.6 °C) (Tympanic)   Resp 15   Ht 5' 2.5\" (1.588 m)   Wt 177 lb 1.6 oz (80.3 kg)   LMP  (LMP Unknown)   SpO2 98%   BMI 31.88 kg/m²     Physical Exam   Constitutional: She is oriented to person, place, and time. She appears well-developed and well-nourished. HENT:   Head: Normocephalic. Eyes: Pupils are equal, round, and reactive to light. Neck: No tracheal deviation present. Cardiovascular: Normal rate, regular rhythm and normal heart sounds. Exam reveals no gallop and no friction rub. No murmur heard. Pulmonary/Chest: No respiratory distress. Abdominal: Soft. Bowel sounds are normal. She exhibits no distension. There is no rebound. Musculoskeletal: She exhibits no edema. Neurological: She is oriented to person, place, and time. Skin: Skin is warm and dry. Assessment:       Diagnosis Orders   1. Community acquired pneumonia, unspecified laterality  XR CHEST STANDARD (2 VW)   2. Essential hypertension           Plan:    Finished levaquin and prednisone  New regimen is working well. No changes to plan of care needs bw at next month  Repeat cxr in 4 weeks to verify resolution, she understands risk of lung cancer. She refused pt, she understands risk of falls. She had flu shot there. Will verify which pneumoshot she had and give other once this information is obtained. She appears non toxic. Orders Placed This Encounter   Procedures    XR CHEST STANDARD (2 VW)     Standing Status:   Future     Standing Expiration Date:   11/16/2019     Order Specific Question:   Reason for exam:     Answer:   follow up of pneumonia     No orders of the defined types were placed in this encounter.       Return for worsening symptoms, call ASAP for appointment, regularly scheduled appointment with Orthopedic

## 2025-03-19 NOTE — ED ADULT NURSE NOTE - OBJECTIVE STATEMENT
45 yo F presented to the ED with neck pain since Friday. AOx4, ambulates independently. Pt complains of stiffness and pain of the neck w/ limited ROM, tingling down B/L arms. Denies weakness. Pt works at a desk for prolonged hours. Pt has been taking naproxen and muscle relaxer prescribed from .  Pt has PMH of HTN, asthma and kidney disease. Pt denies fever, chills, numbness, chest pain, SOB.

## 2025-03-19 NOTE — ED PROVIDER NOTE - PATIENT PORTAL LINK FT
You can access the FollowMyHealth Patient Portal offered by [facility] by registering at the following website: [email or web address]. By joining [employer]’s FollowMyHealth portal, you will also be able to view your health information using other applications (apps) compatible with our system.

## 2025-03-19 NOTE — ED PROVIDER NOTE - PHYSICAL EXAMINATION
GEN: Pt in NAD, nontoxic.  PSYCH: Affect appropriate.  EYES: Sclera white w/o injection. PERRL, no nystagmus.  ENT: Head NCAT. Neck supple FROM.  RESP: CTA b/l, no wheezes, rales, or rhonchi.   CARDIAC: RRR, clear distinct S1, S2, no appreciable murmurs.  VASC: No edema or calf tenderness.  NEURO: CN2-12 grossly intact. Normal and equal sensation and 5/5 strength UE and LE b/l. Pronator drift negative.  MSK: No joint erythema or obvious deformity. No obvious spinal deformity, no midline spinal ttp, no step-offs. +ttp of R upper trapezius and R paracervical musculature. palpable R trapezius muscle spasm. Neck supple, limited L lateral rotation due to pain but >45 degrees. Pain reproduced with shrugging of R shoulder. FROM b/l UE and LE w/o pain.   SKIN: No notable rash.

## 2025-03-19 NOTE — ED PROVIDER NOTE - OBJECTIVE STATEMENT
44-year-old female with no significant past medical history presents to the ED complaining of right-sided neck and upper trapezius pain for 5 to 6 days which began on 3/14/2025.  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  Patient reports has not had pain like this before, no preceding single incident or trauma denies MVC or fall.  Patient reports progressively worsening pain which feels like a tightness to the area worse with the left lateral rotation and left lateral flexion.  Patient reports using over-the-counter pain medications, predominantly topicals and NSAIDs without significant relief.  Went to urgent care 4 days ago and was prescribed naproxen and cyclobenzaprine which she has been taking without significant relief last dose of both was at 3 AM.  Patient reports went to work yesterday had initial relief with pain medication but worsening of pain throughout the day became more severe at night it was waking patient up from sleep last night prompting ED visit.  Patient denies visual change, dizziness, vomiting, rash, chest pain or shortness of breath, numbness, paresthesias, weakness, bladder or bowel incontinence or retention, history of IVDA, prior spinal surgeries or injections, fevers, chills, recent illness or current URI symptoms, headache/photophobia.

## 2025-03-19 NOTE — ED PROVIDER NOTE - PROGRESS NOTE DETAILS
Patient reassessed feeling better appears more comfortable sitting on laptop on stretcher.  Patient reports improvement in pain and range of motion comfortable with discharge and outpatient follow-up.  Patient will go to physical therapy and request prescription from her PCP. Patient counseled on diagnosis and outpatient follow-up counseled on proper use of Valium (pt drove to ED today and could not get while in ED).  All questions answered patient ready for discharge. -De Torre PA-C

## 2025-03-19 NOTE — ED PROVIDER NOTE - ATTENDING APP SHARED VISIT CONTRIBUTION OF CARE
43 yo female p/w R trapezius pain.  no CP, no SOB, no fevers.    nontoxic on exam, conversant.  focal trapezius TTP with muscle spasm noted.  no cellulitis.  ROM preserved at R shoulder.    conservative measures, PT.  stable for outpatient management.

## 2025-03-19 NOTE — ED PROVIDER NOTE - NSFOLLOWUPINSTRUCTIONS_ED_ALL_ED_FT
You are seen in the emergency department for neck pain.    Please take ibuprofen (available also as Motrin or Advil) 600 mg, which is 3 regular strength over-the-counter pills every 6 hours as needed for pain.  Additionally please take Tylenol 1000 mg, 2 extra strength over-the-counter pills, every 6 hours as needed for pain.    Please note that Advil, Motrin, ibuprofen, naproxen, and Aleve all work similarly and these medication should NOT be taken together.    Please STOP taking cyclobenzaprine and begin taking Valium as prescribed.  Do not drive, operate machinery, drink alcohol or take other sedating medications while taking Valium.    Please apply warm compresses to the area as directed.  You may use over-the-counter Salonpas or lidocaine patch as directed on the area.    Please follow-up with physical therapy.    Please go to the emergency department immediately if you experience new numbness, tingling, weakness, change in vision, dizziness or room spinning, difficulty breathing, severe pain not relieved with medications, unable to urinate or pass stool, incontinence (losing control of urine or stool), fevers, chills, or if any other concerns.

## 2025-07-28 NOTE — PATIENT PROFILE ADULT - NS PRO AD PATIENT TYPE
Dr Yoo - pt seen 1/22/25 for vertigo  Seen by Dr Coker 3/27/25, Audiology and ENT 4/28/25  EP 6/18/25  Has went through PT as well    Pt had normal MRI brain with IAC 1/31/25    Had echo 7/24 which was normal    Message received  I’m still having a lot of dizziness. I’ve been to physical therapy, I’ve been to the cardiologist, I’ve been to the eye doctor, I’ve I’ve had an MRI, I’ve been to ear nose and throat. One of my first questions I asked, could it be one of my medication’s? I’m on. Could it be the lisinopril? I would like to cut down on lisinopril or stop taking it to see if this helps. It’s been a year plus and I’m still having the symptoms with no answers.     Please advise  
Per Dr. Yoo - stop lisinopril and update next week with symptoms.     Message sent to pt.   
AMS
Health Care Proxy (HCP)